# Patient Record
Sex: FEMALE | Race: WHITE | NOT HISPANIC OR LATINO | Employment: FULL TIME | URBAN - NONMETROPOLITAN AREA
[De-identification: names, ages, dates, MRNs, and addresses within clinical notes are randomized per-mention and may not be internally consistent; named-entity substitution may affect disease eponyms.]

---

## 2021-09-16 ENCOUNTER — OFFICE VISIT (OUTPATIENT)
Dept: FAMILY MEDICINE | Facility: CLINIC | Age: 66
End: 2021-09-16
Payer: COMMERCIAL

## 2021-09-16 ENCOUNTER — INFUSION (OUTPATIENT)
Dept: INFECTIOUS DISEASES | Facility: HOSPITAL | Age: 66
End: 2021-09-16
Attending: FAMILY MEDICINE
Payer: COMMERCIAL

## 2021-09-16 VITALS
HEART RATE: 74 BPM | BODY MASS INDEX: 31.24 KG/M2 | OXYGEN SATURATION: 95 % | RESPIRATION RATE: 18 BRPM | TEMPERATURE: 97 F | WEIGHT: 183 LBS | HEIGHT: 64 IN | SYSTOLIC BLOOD PRESSURE: 150 MMHG | DIASTOLIC BLOOD PRESSURE: 90 MMHG

## 2021-09-16 VITALS
DIASTOLIC BLOOD PRESSURE: 89 MMHG | HEART RATE: 78 BPM | TEMPERATURE: 99 F | OXYGEN SATURATION: 96 % | RESPIRATION RATE: 18 BRPM | SYSTOLIC BLOOD PRESSURE: 168 MMHG

## 2021-09-16 DIAGNOSIS — U07.1 COVID-19: ICD-10-CM

## 2021-09-16 DIAGNOSIS — Z20.822 CONTACT WITH AND (SUSPECTED) EXPOSURE TO COVID-19: ICD-10-CM

## 2021-09-16 DIAGNOSIS — U07.1 COVID-19: Primary | ICD-10-CM

## 2021-09-16 DIAGNOSIS — R09.81 NASAL CONGESTION: Primary | ICD-10-CM

## 2021-09-16 DIAGNOSIS — I50.9 CONGESTIVE HEART FAILURE, UNSPECIFIED HF CHRONICITY, UNSPECIFIED HEART FAILURE TYPE: ICD-10-CM

## 2021-09-16 LAB
CTP QC/QA: YES
SARS-COV-2 AG RESP QL IA.RAPID: POSITIVE

## 2021-09-16 PROCEDURE — 3008F BODY MASS INDEX DOCD: CPT | Mod: ,,, | Performed by: FAMILY MEDICINE

## 2021-09-16 PROCEDURE — 1159F MED LIST DOCD IN RCRD: CPT | Mod: ,,, | Performed by: FAMILY MEDICINE

## 2021-09-16 PROCEDURE — 1101F PR PT FALLS ASSESS DOC 0-1 FALLS W/OUT INJ PAST YR: ICD-10-PCS | Mod: ,,, | Performed by: FAMILY MEDICINE

## 2021-09-16 PROCEDURE — 3080F PR MOST RECENT DIASTOLIC BLOOD PRESSURE >= 90 MM HG: ICD-10-PCS | Mod: ,,, | Performed by: FAMILY MEDICINE

## 2021-09-16 PROCEDURE — 63600175 PHARM REV CODE 636 W HCPCS: Performed by: FAMILY MEDICINE

## 2021-09-16 PROCEDURE — 99213 OFFICE O/P EST LOW 20 MIN: CPT | Mod: ,,, | Performed by: FAMILY MEDICINE

## 2021-09-16 PROCEDURE — M0243 CASIRIVI AND IMDEVI INFUSION: HCPCS | Performed by: FAMILY MEDICINE

## 2021-09-16 PROCEDURE — 87426 SARS CORONAVIRUS 2 ANTIGEN POCT: ICD-10-PCS | Mod: QW,,, | Performed by: FAMILY MEDICINE

## 2021-09-16 PROCEDURE — 3077F PR MOST RECENT SYSTOLIC BLOOD PRESSURE >= 140 MM HG: ICD-10-PCS | Mod: ,,, | Performed by: FAMILY MEDICINE

## 2021-09-16 PROCEDURE — 25000003 PHARM REV CODE 250: Performed by: FAMILY MEDICINE

## 2021-09-16 PROCEDURE — 3080F DIAST BP >= 90 MM HG: CPT | Mod: ,,, | Performed by: FAMILY MEDICINE

## 2021-09-16 PROCEDURE — 3288F FALL RISK ASSESSMENT DOCD: CPT | Mod: ,,, | Performed by: FAMILY MEDICINE

## 2021-09-16 PROCEDURE — 99213 PR OFFICE/OUTPT VISIT, EST, LEVL III, 20-29 MIN: ICD-10-PCS | Mod: ,,, | Performed by: FAMILY MEDICINE

## 2021-09-16 PROCEDURE — 3008F PR BODY MASS INDEX (BMI) DOCUMENTED: ICD-10-PCS | Mod: ,,, | Performed by: FAMILY MEDICINE

## 2021-09-16 PROCEDURE — 1126F AMNT PAIN NOTED NONE PRSNT: CPT | Mod: ,,, | Performed by: FAMILY MEDICINE

## 2021-09-16 PROCEDURE — 1101F PT FALLS ASSESS-DOCD LE1/YR: CPT | Mod: ,,, | Performed by: FAMILY MEDICINE

## 2021-09-16 PROCEDURE — 1159F PR MEDICATION LIST DOCUMENTED IN MEDICAL RECORD: ICD-10-PCS | Mod: ,,, | Performed by: FAMILY MEDICINE

## 2021-09-16 PROCEDURE — 3288F PR FALLS RISK ASSESSMENT DOCUMENTED: ICD-10-PCS | Mod: ,,, | Performed by: FAMILY MEDICINE

## 2021-09-16 PROCEDURE — 1126F PR PAIN SEVERITY QUANTIFIED, NO PAIN PRESENT: ICD-10-PCS | Mod: ,,, | Performed by: FAMILY MEDICINE

## 2021-09-16 PROCEDURE — 3077F SYST BP >= 140 MM HG: CPT | Mod: ,,, | Performed by: FAMILY MEDICINE

## 2021-09-16 PROCEDURE — 87426 SARSCOV CORONAVIRUS AG IA: CPT | Mod: QW,,, | Performed by: FAMILY MEDICINE

## 2021-09-16 RX ORDER — LEVOTHYROXINE SODIUM 125 UG/1
125 TABLET ORAL DAILY
COMMUNITY
Start: 2021-08-19 | End: 2023-02-21 | Stop reason: SDUPTHER

## 2021-09-16 RX ORDER — ONDANSETRON 4 MG/1
4 TABLET, ORALLY DISINTEGRATING ORAL ONCE AS NEEDED
Status: DISCONTINUED | OUTPATIENT
Start: 2021-09-16 | End: 2023-01-18

## 2021-09-16 RX ORDER — ACETAMINOPHEN 500 MG
5000 TABLET ORAL DAILY
Status: ON HOLD | COMMUNITY
End: 2023-06-23 | Stop reason: HOSPADM

## 2021-09-16 RX ORDER — SODIUM CHLORIDE 0.9 % (FLUSH) 0.9 %
10 SYRINGE (ML) INJECTION
Status: DISCONTINUED | OUTPATIENT
Start: 2021-09-16 | End: 2023-01-18

## 2021-09-16 RX ORDER — DUREZOL 0.5 MG/ML
1 EMULSION OPHTHALMIC 2 TIMES DAILY PRN
COMMUNITY
Start: 2021-08-19 | End: 2023-02-20

## 2021-09-16 RX ORDER — CETIRIZINE HYDROCHLORIDE, PSEUDOEPHEDRINE HYDROCHLORIDE 5; 120 MG/1; MG/1
1 TABLET, FILM COATED, EXTENDED RELEASE ORAL DAILY
COMMUNITY
Start: 2021-09-15 | End: 2023-02-20

## 2021-09-16 RX ORDER — FUROSEMIDE 20 MG/1
40 TABLET ORAL DAILY
COMMUNITY
Start: 2021-08-19 | End: 2023-04-06 | Stop reason: SDUPTHER

## 2021-09-16 RX ORDER — IBUPROFEN 200 MG
1 CAPSULE ORAL DAILY
COMMUNITY

## 2021-09-16 RX ORDER — DIPHENHYDRAMINE HYDROCHLORIDE 50 MG/ML
25 INJECTION INTRAMUSCULAR; INTRAVENOUS ONCE AS NEEDED
Status: DISCONTINUED | OUTPATIENT
Start: 2021-09-16 | End: 2023-01-18

## 2021-09-16 RX ORDER — ACETAMINOPHEN 325 MG/1
650 TABLET ORAL ONCE AS NEEDED
Status: DISCONTINUED | OUTPATIENT
Start: 2021-09-16 | End: 2023-01-18

## 2021-09-16 RX ORDER — EPINEPHRINE 0.3 MG/.3ML
0.3 INJECTION SUBCUTANEOUS
Status: DISCONTINUED | OUTPATIENT
Start: 2021-09-16 | End: 2023-01-18

## 2021-09-16 RX ORDER — PANTOPRAZOLE SODIUM 40 MG/1
40 TABLET, DELAYED RELEASE ORAL DAILY
Status: ON HOLD | COMMUNITY
Start: 2021-08-19 | End: 2023-02-14 | Stop reason: SDUPTHER

## 2021-09-16 RX ORDER — AZITHROMYCIN 250 MG/1
TABLET, FILM COATED ORAL
Qty: 6 TABLET | Refills: 0 | Status: SHIPPED | OUTPATIENT
Start: 2021-09-16 | End: 2021-09-21

## 2021-09-16 RX ORDER — NEBIVOLOL HYDROCHLORIDE 5 MG/1
5 TABLET ORAL DAILY
COMMUNITY
Start: 2021-08-19 | End: 2023-11-30 | Stop reason: SDUPTHER

## 2021-09-16 RX ORDER — ALBUTEROL SULFATE 90 UG/1
2 AEROSOL, METERED RESPIRATORY (INHALATION)
Status: DISCONTINUED | OUTPATIENT
Start: 2021-09-16 | End: 2023-01-18

## 2021-09-16 RX ORDER — POTASSIUM CHLORIDE 750 MG/1
10 TABLET, EXTENDED RELEASE ORAL DAILY
COMMUNITY
Start: 2021-08-19 | End: 2023-04-06 | Stop reason: SDUPTHER

## 2021-09-16 RX ADMIN — SODIUM CHLORIDE 600 MG: 9 INJECTION, SOLUTION INTRAVENOUS at 12:09

## 2022-07-18 ENCOUNTER — OFFICE VISIT (OUTPATIENT)
Dept: FAMILY MEDICINE | Facility: CLINIC | Age: 67
End: 2022-07-18
Payer: COMMERCIAL

## 2022-07-18 VITALS
TEMPERATURE: 98 F | BODY MASS INDEX: 32.2 KG/M2 | RESPIRATION RATE: 18 BRPM | WEIGHT: 188.63 LBS | DIASTOLIC BLOOD PRESSURE: 90 MMHG | HEART RATE: 81 BPM | HEIGHT: 64 IN | SYSTOLIC BLOOD PRESSURE: 150 MMHG | OXYGEN SATURATION: 96 %

## 2022-07-18 DIAGNOSIS — D69.6 SEVERE THROMBOCYTOPENIA: ICD-10-CM

## 2022-07-18 DIAGNOSIS — F32.A DEPRESSION, UNSPECIFIED DEPRESSION TYPE: ICD-10-CM

## 2022-07-18 DIAGNOSIS — F41.9 ANXIETY: ICD-10-CM

## 2022-07-18 DIAGNOSIS — I10 HYPERTENSION, UNSPECIFIED TYPE: ICD-10-CM

## 2022-07-18 DIAGNOSIS — Z78.0 POST-MENOPAUSAL: ICD-10-CM

## 2022-07-18 DIAGNOSIS — R53.83 FATIGUE, UNSPECIFIED TYPE: ICD-10-CM

## 2022-07-18 DIAGNOSIS — Z12.39 ENCOUNTER FOR SCREENING FOR MALIGNANT NEOPLASM OF BREAST, UNSPECIFIED SCREENING MODALITY: ICD-10-CM

## 2022-07-18 DIAGNOSIS — G47.00 INSOMNIA, UNSPECIFIED TYPE: ICD-10-CM

## 2022-07-18 DIAGNOSIS — E03.9 HYPOTHYROIDISM, UNSPECIFIED TYPE: Primary | ICD-10-CM

## 2022-07-18 LAB
BILIRUB SERPL-MCNC: NEGATIVE MG/DL
BLOOD URINE, POC: NORMAL
COLOR, POC UA: YELLOW
GLUCOSE UR QL STRIP: NEGATIVE
KETONES UR QL STRIP: NEGATIVE
LEUKOCYTE ESTERASE URINE, POC: NEGATIVE
NITRITE, POC UA: NEGATIVE
PH, POC UA: 6
PROTEIN, POC: NEGATIVE
SPECIFIC GRAVITY, POC UA: 1.02
UROBILINOGEN, POC UA: 0.2

## 2022-07-18 PROCEDURE — 80050 GENERAL HEALTH PANEL: CPT | Mod: ICN,,, | Performed by: CLINICAL MEDICAL LABORATORY

## 2022-07-18 PROCEDURE — 82306 VITAMIN D 25 HYDROXY: CPT | Mod: ICN,,, | Performed by: CLINICAL MEDICAL LABORATORY

## 2022-07-18 PROCEDURE — 1126F AMNT PAIN NOTED NONE PRSNT: CPT | Mod: ,,, | Performed by: NURSE PRACTITIONER

## 2022-07-18 PROCEDURE — 1160F PR REVIEW ALL MEDS BY PRESCRIBER/CLIN PHARMACIST DOCUMENTED: ICD-10-PCS | Mod: ,,, | Performed by: NURSE PRACTITIONER

## 2022-07-18 PROCEDURE — 1101F PT FALLS ASSESS-DOCD LE1/YR: CPT | Mod: ,,, | Performed by: NURSE PRACTITIONER

## 2022-07-18 PROCEDURE — 1101F PR PT FALLS ASSESS DOC 0-1 FALLS W/OUT INJ PAST YR: ICD-10-PCS | Mod: ,,, | Performed by: NURSE PRACTITIONER

## 2022-07-18 PROCEDURE — 1160F RVW MEDS BY RX/DR IN RCRD: CPT | Mod: ,,, | Performed by: NURSE PRACTITIONER

## 2022-07-18 PROCEDURE — 99214 PR OFFICE/OUTPT VISIT, EST, LEVL IV, 30-39 MIN: ICD-10-PCS | Mod: ,,, | Performed by: NURSE PRACTITIONER

## 2022-07-18 PROCEDURE — 80050 PR  GENERAL HEALTH PANEL: ICD-10-PCS | Mod: ICN,,, | Performed by: CLINICAL MEDICAL LABORATORY

## 2022-07-18 PROCEDURE — 3288F PR FALLS RISK ASSESSMENT DOCUMENTED: ICD-10-PCS | Mod: ,,, | Performed by: NURSE PRACTITIONER

## 2022-07-18 PROCEDURE — 99214 OFFICE O/P EST MOD 30 MIN: CPT | Mod: ,,, | Performed by: NURSE PRACTITIONER

## 2022-07-18 PROCEDURE — 3288F FALL RISK ASSESSMENT DOCD: CPT | Mod: ,,, | Performed by: NURSE PRACTITIONER

## 2022-07-18 PROCEDURE — 3008F PR BODY MASS INDEX (BMI) DOCUMENTED: ICD-10-PCS | Mod: ,,, | Performed by: NURSE PRACTITIONER

## 2022-07-18 PROCEDURE — 3008F BODY MASS INDEX DOCD: CPT | Mod: ,,, | Performed by: NURSE PRACTITIONER

## 2022-07-18 PROCEDURE — 3077F SYST BP >= 140 MM HG: CPT | Mod: ,,, | Performed by: NURSE PRACTITIONER

## 2022-07-18 PROCEDURE — 1126F PR PAIN SEVERITY QUANTIFIED, NO PAIN PRESENT: ICD-10-PCS | Mod: ,,, | Performed by: NURSE PRACTITIONER

## 2022-07-18 PROCEDURE — 1159F PR MEDICATION LIST DOCUMENTED IN MEDICAL RECORD: ICD-10-PCS | Mod: ,,, | Performed by: NURSE PRACTITIONER

## 2022-07-18 PROCEDURE — 3077F PR MOST RECENT SYSTOLIC BLOOD PRESSURE >= 140 MM HG: ICD-10-PCS | Mod: ,,, | Performed by: NURSE PRACTITIONER

## 2022-07-18 PROCEDURE — 3080F PR MOST RECENT DIASTOLIC BLOOD PRESSURE >= 90 MM HG: ICD-10-PCS | Mod: ,,, | Performed by: NURSE PRACTITIONER

## 2022-07-18 PROCEDURE — 81003 URINALYSIS AUTO W/O SCOPE: CPT | Mod: QW,,, | Performed by: NURSE PRACTITIONER

## 2022-07-18 PROCEDURE — 3080F DIAST BP >= 90 MM HG: CPT | Mod: ,,, | Performed by: NURSE PRACTITIONER

## 2022-07-18 PROCEDURE — 1159F MED LIST DOCD IN RCRD: CPT | Mod: ,,, | Performed by: NURSE PRACTITIONER

## 2022-07-18 PROCEDURE — 82306 VITAMIN D: ICD-10-PCS | Mod: ICN,,, | Performed by: CLINICAL MEDICAL LABORATORY

## 2022-07-18 PROCEDURE — 81003 POCT URINALYSIS W/O SCOPE: ICD-10-PCS | Mod: QW,,, | Performed by: NURSE PRACTITIONER

## 2022-07-18 RX ORDER — TRAZODONE HYDROCHLORIDE 100 MG/1
100 TABLET ORAL NIGHTLY
Qty: 30 TABLET | Refills: 11 | Status: SHIPPED | OUTPATIENT
Start: 2022-07-18 | End: 2023-04-06 | Stop reason: SDUPTHER

## 2022-07-18 RX ORDER — VORTIOXETINE 10 MG/1
1 TABLET, FILM COATED ORAL DAILY
Qty: 30 TABLET | Refills: 2 | Status: SHIPPED | OUTPATIENT
Start: 2022-07-18 | End: 2022-11-21 | Stop reason: SDUPTHER

## 2022-07-18 NOTE — PROGRESS NOTES
07/18/22 1454   Depression Patient Health Questionnaire (PHQ-2)   Over the last two weeks how often have you been bothered by little interest or pleasure in doing things 3   Over the last two weeks how often have you been bothered by feeling down, depressed or hopeless 0   PHQ-2 Total Score 3   Depression Patient Health Questionnaire (PHQ-9)   Over the last two weeks how often have you been bothered by trouble falling or staying asleep, or sleeping too much 2   Over the last two weeks how often have you been bothered by feeling tired or having little energy 3   Over the last two weeks how often have you been bothered by a poor appetite or overeating 0   Over the last two weeks how often have you been bothered by feeling bad about yourself - or that you are a failure or have let yourself or your family down 3   Over the last two weeks how often have you been bothered by trouble concentrating on things, such as reading the newspaper or watching television 3   Over the last two weeks how often have you been bothered by moving or speaking so slowly that other people could have noticed. Or the opposite - being so fidgety or restless that you have been moving around a lot more than usual. 3   Over the last two weeks how often have you been bothered by thoughts that you would be better off dead, or of hurting yourself 0   If you checked off any problems, how difficult have these problems made it for you to do your work, take care of things at home or get along with other people? Somewhat difficult   PHQ-9 Score 17   PHQ-9 Interpretation Moderately Severe

## 2022-07-18 NOTE — PROGRESS NOTES
Kristyn Brito NP   [facility]  [address]  [place]  [address]      PATIENT NAME: Ene Martin  : 1955  DATE: 22  MRN: [id number]      Billing Provider: Kristyn Brito NP  Level of Service:   Patient PCP Information     Provider PCP Type    Dagoberto Ravi DO General          Reason for Visit / Chief Complaint: Fatigue (Has been feeling weak and tired for a while. Worse in the past couple days. Has had some nervousness/shakiness since yesterday. Has had a lot of anxiety and depression lately. Not able to focus or complete tasks. )       Update PCP  Update Chief Complaint         History of Present Illness / Problem Focused Workflow     Ene Martin presents to the clinic with Fatigue (Has been feeling weak and tired for a while. Worse in the past couple days. Has had some nervousness/shakiness since yesterday. Has had a lot of anxiety and depression lately. Not able to focus or complete tasks. )     66 year old female presents to clinic with complaints of feeling very fatigued and tired all the time, has no energy. Patient reports she is worried that it is a UTI because she often feels as if her bladder is not completely emptying. She also has complaints of anxiety and depression that seems to have gotten worse lately. She states she feels she is unable to focus and complete tasks. She reports she has taken Prozac in the past years ago and it did not help much, has also tried Zoloft, Lexapro, and Wellbutrin in past with minimal improvement. She is also requesting something to help her rest at night, has taken Trazodone in past with good results and requests refill on it. Patient has PMH of Anemia, CHF, Depression, GERD, Hx of alcohol abuse, Hyperlipidemia, HTN, Hypothyroidism, Idiopathic thrombocytopenic purpura, Left bundle branch block, Obesity, Osteomalacia, Osteoporosis, and Pernicious Anemia.    Blood pressure elevated at today's visit. Patient states she is  feeling anxious today. States blood pressure has been well controlled at home.       Review of Systems     @Review of Systems   Constitutional: Positive for fatigue. Negative for activity change, appetite change and fever.   HENT: Negative for nasal congestion, ear pain, rhinorrhea, sinus pressure/congestion and sore throat.    Eyes: Negative for pain, redness, visual disturbance and eye dryness.   Respiratory: Negative for cough and shortness of breath.    Cardiovascular: Negative for chest pain and leg swelling.   Gastrointestinal: Negative for abdominal distention, abdominal pain, constipation and diarrhea.   Endocrine: Negative for cold intolerance, heat intolerance and polyuria.   Genitourinary: Negative for bladder incontinence, dysuria, frequency and urgency.   Musculoskeletal: Negative for arthralgias, gait problem and myalgias.   Integumentary:  Negative for color change, rash and wound.   Allergic/Immunologic: Negative for environmental allergies and food allergies.   Neurological: Negative for dizziness, weakness, light-headedness and headaches.   Psychiatric/Behavioral: Positive for agitation, decreased concentration, dysphoric mood and sleep disturbance. Negative for behavioral problems. The patient is nervous/anxious.        Medical / Social / Family History     Past Medical History:   Diagnosis Date    Anemia     Anxiety     CHF (congestive heart failure)     Depression     GERD (gastroesophageal reflux disease)     History of alcohol abuse     Hyperlipidemia     Hypertension     Hypothyroidism     Idiopathic thrombocytopenic purpura     Left bundle branch block     Obesity     Osteomalacia     Osteoporosis     Pernicious anemia        Past Surgical History:   Procedure Laterality Date     SECTION      HYSTERECTOMY      THYROIDECTOMY         Social History  Ms.  reports that she has been smoking vaping with nicotine. She has never used smokeless tobacco. She reports  current alcohol use. She reports that she does not use drugs.    Family History  Ms.'s family history includes Lung cancer in her father; Lupus in her daughter; No Known Problems in her brother, maternal grandfather, maternal grandmother, paternal grandfather, paternal grandmother, sister, and son; Osteoporosis in her mother.    Medications and Allergies     Medications  Outpatient Medications Marked as Taking for the 7/18/22 encounter (Office Visit) with Kristyn Brito NP   Medication Sig Dispense Refill    BYSTOLIC 5 mg Tab Take 5 mg by mouth once daily.      calcium carbonate-vitamin D3 500 mg-3.125 mcg (125 unit) per tablet Take 1 tablet by mouth once daily.      cholecalciferol, vitamin D3, 125 mcg (5,000 unit) Tab Take 5,000 Units by mouth once daily.      furosemide (LASIX) 20 MG tablet Take 20 mg by mouth once daily.      multivit-min/iron/folic/lutein (CENTRUM SILVER WOMEN ORAL) Take 1 tablet by mouth once daily.      pantoprazole (PROTONIX) 40 MG tablet Take 40 mg by mouth once daily.      potassium chloride (KLOR-CON) 10 MEQ TbSR Take 10 mEq by mouth once daily.      SYNTHROID 125 mcg tablet Take 125 mcg by mouth once daily.       Current Facility-Administered Medications for the 7/18/22 encounter (Office Visit) with Kristyn Brito NP   Medication Dose Route Frequency Provider Last Rate Last Admin    acetaminophen tablet 650 mg  650 mg Oral Once PRN Dagoberto Ravi, DO        albuterol inhaler 2 puff  2 puff Inhalation Q20 Min PRN Dagoberto Ravi, DO        diphenhydrAMINE injection 25 mg  25 mg Intravenous Once PRN Dagoberto Ravi, DO        EPINEPHrine (EPIPEN) 0.3 mg/0.3 mL pen injection 0.3 mg  0.3 mg Intramuscular PRN Dagoberto Ravi, DO        methylPREDNISolone sodium succinate injection 40 mg  40 mg Intravenous Once PRN Dagoberto Ravi, DO        ondansetron disintegrating tablet 4 mg  4 mg Oral Once PRN Dagoberto Ravi, DO        sodium chloride 0.9% 500 mL flush bag    Intravenous PRN Dagoberto Ravi DO        sodium chloride 0.9% flush 10 mL  10 mL Intravenous PRN Dagoberto Ravi DO           Allergies  Review of patient's allergies indicates:   Allergen Reactions    Lisinopril        Physical Examination     Vitals:    07/18/22 1446   BP: (!) 150/90   Pulse: 81   Resp: 18   Temp: 98.1 °F (36.7 °C)     Physical Exam  Vitals and nursing note reviewed.   HENT:      Head: Normocephalic.      Right Ear: Tympanic membrane normal.      Left Ear: Tympanic membrane normal.      Nose: Nose normal.      Mouth/Throat:      Mouth: Mucous membranes are moist.      Pharynx: Oropharynx is clear. No posterior oropharyngeal erythema.   Eyes:      Pupils: Pupils are equal, round, and reactive to light.   Cardiovascular:      Rate and Rhythm: Normal rate and regular rhythm.      Heart sounds: Normal heart sounds.   Pulmonary:      Effort: Pulmonary effort is normal.      Breath sounds: Normal breath sounds.   Abdominal:      General: Abdomen is flat. Bowel sounds are normal. There is no distension.      Palpations: Abdomen is soft.      Tenderness: There is no abdominal tenderness. There is no right CVA tenderness or left CVA tenderness.   Musculoskeletal:         General: No swelling or tenderness.      Cervical back: Normal range of motion.      Right lower leg: No edema.      Left lower leg: No edema.   Skin:     General: Skin is warm and dry.      Capillary Refill: Capillary refill takes less than 2 seconds.   Neurological:      Mental Status: She is alert and oriented to person, place, and time.   Psychiatric:         Mood and Affect: Mood is anxious and depressed.         Behavior: Behavior normal.         Thought Content: Thought content normal.         Cognition and Memory: Cognition normal.         Judgment: Judgment normal.               No results found for: WBC, HGB, HCT, MCV, PLT       No results found for: NA, K, CL, CO2, GLU, BUN, CREATININE, CALCIUM, PROT, ALBUMIN, BILITOT,  ALKPHOS, AST, ALT, ANIONGAP, ESTGFRAFRICA, EGFRNONAA   No image results found.     Procedures   Assessment and Plan (including Health Maintenance)      Problem List  Smart Sets  Document Outside HM   :    Plan:           Problem List Items Addressed This Visit    None     Visit Diagnoses     Hypothyroidism, unspecified type    -  Primary    Encounter for screening for malignant neoplasm of breast, unspecified screening modality        Post-menopausal        Fatigue, unspecified type              Health Maintenance Topics with due status: Not Due       Topic Last Completion Date    Influenza Vaccine Not Due       No future appointments.     Health Maintenance Due   Topic Date Due    Hepatitis C Screening  Never done    TETANUS VACCINE  Never done    Mammogram  Never done    DEXA Scan  Never done    Pneumococcal Vaccines (Age 65+) (2 - PPSV23 or PCV20) 03/30/2016        No follow-ups on file.     Signature:  Kristyn Brito NP  [facility]  [address] [place] [address], [place]  [address]    Date of encounter: 7/18/22

## 2022-07-19 DIAGNOSIS — D69.6 SEVERE THROMBOCYTOPENIA: Primary | ICD-10-CM

## 2022-07-19 PROBLEM — Z78.0 POST-MENOPAUSAL: Status: ACTIVE | Noted: 2022-07-19

## 2022-07-19 PROBLEM — R53.83 FATIGUE: Status: ACTIVE | Noted: 2022-07-19

## 2022-07-19 PROBLEM — E03.9 HYPOTHYROIDISM: Status: ACTIVE | Noted: 2022-07-19

## 2022-07-19 PROBLEM — U07.1 COVID-19: Status: RESOLVED | Noted: 2021-09-16 | Resolved: 2022-07-19

## 2022-07-19 PROBLEM — Z12.39 ENCOUNTER FOR SCREENING FOR MALIGNANT NEOPLASM OF BREAST: Status: ACTIVE | Noted: 2022-07-19

## 2022-07-19 PROBLEM — G47.00 INSOMNIA: Status: ACTIVE | Noted: 2022-07-19

## 2022-07-19 LAB
25(OH)D3 SERPL-MCNC: 58.2 NG/ML
ALBUMIN SERPL BCP-MCNC: 3.9 G/DL (ref 3.5–5)
ALBUMIN/GLOB SERPL: 1 {RATIO}
ALP SERPL-CCNC: 129 U/L (ref 55–142)
ALT SERPL W P-5'-P-CCNC: 170 U/L (ref 13–56)
ANION GAP SERPL CALCULATED.3IONS-SCNC: 8 MMOL/L (ref 7–16)
AST SERPL W P-5'-P-CCNC: 132 U/L (ref 15–37)
BASOPHILS # BLD AUTO: 0.07 K/UL (ref 0–0.2)
BASOPHILS NFR BLD AUTO: 1 % (ref 0–1)
BILIRUB SERPL-MCNC: 0.5 MG/DL (ref 0–1.2)
BUN SERPL-MCNC: 15 MG/DL (ref 7–18)
BUN/CREAT SERPL: 16 (ref 6–20)
CALCIUM SERPL-MCNC: 9.2 MG/DL (ref 8.5–10.1)
CHLORIDE SERPL-SCNC: 104 MMOL/L (ref 98–107)
CO2 SERPL-SCNC: 30 MMOL/L (ref 21–32)
CREAT SERPL-MCNC: 0.96 MG/DL (ref 0.55–1.02)
DIFFERENTIAL METHOD BLD: ABNORMAL
EOSINOPHIL # BLD AUTO: 0.16 K/UL (ref 0–0.5)
EOSINOPHIL NFR BLD AUTO: 2.3 % (ref 1–4)
ERYTHROCYTE [DISTWIDTH] IN BLOOD BY AUTOMATED COUNT: 13.1 % (ref 11.5–14.5)
GLOBULIN SER-MCNC: 3.9 G/DL (ref 2–4)
GLUCOSE SERPL-MCNC: 125 MG/DL (ref 74–106)
HCT VFR BLD AUTO: 44.7 % (ref 38–47)
HGB BLD-MCNC: 15.4 G/DL (ref 12–16)
IMM GRANULOCYTES # BLD AUTO: 0.03 K/UL (ref 0–0.04)
IMM GRANULOCYTES NFR BLD: 0.4 % (ref 0–0.4)
LYMPHOCYTES # BLD AUTO: 1.99 K/UL (ref 1–4.8)
LYMPHOCYTES NFR BLD AUTO: 28.3 % (ref 27–41)
MCH RBC QN AUTO: 33.8 PG (ref 27–31)
MCHC RBC AUTO-ENTMCNC: 34.5 G/DL (ref 32–36)
MCV RBC AUTO: 98 FL (ref 80–96)
MONOCYTES # BLD AUTO: 0.58 K/UL (ref 0–0.8)
MONOCYTES NFR BLD AUTO: 8.3 % (ref 2–6)
MPC BLD CALC-MCNC: ABNORMAL G/DL
NEUTROPHILS # BLD AUTO: 4.2 K/UL (ref 1.8–7.7)
NEUTROPHILS NFR BLD AUTO: 59.7 % (ref 53–65)
NRBC # BLD AUTO: 0 X10E3/UL
NRBC, AUTO (.00): 0 %
PLATELET # BLD AUTO: 18 K/UL (ref 150–400)
PLATELET MORPHOLOGY: ABNORMAL
POTASSIUM SERPL-SCNC: 3.9 MMOL/L (ref 3.5–5.1)
PROT SERPL-MCNC: 7.8 G/DL (ref 6.4–8.2)
RBC # BLD AUTO: 4.56 M/UL (ref 4.2–5.4)
RBC MORPH BLD: NORMAL
SODIUM SERPL-SCNC: 138 MMOL/L (ref 136–145)
TSH SERPL DL<=0.005 MIU/L-ACNC: 3.69 UIU/ML (ref 0.36–3.74)
WBC # BLD AUTO: 7.03 K/UL (ref 4.5–11)

## 2022-07-19 NOTE — PROGRESS NOTES
Labs reviewed: Patient with severe thrombocytopenia with platelet count of 18. ALT and AST also elevated which could indicate some liver damage. No CBC on file to compare to. Contacted patient and notified of results. She states that she has Idiopathic Thrombocytopenia that she sees Dr Alvarez for. However, she reports her platelets usually run around 40 so this is very low for her. Patient denies any visible blood in urine or stool (she did have trace blood in the UA we performed yesterday), and states she has not had any problems stopping bleeding if she cuts herself or has an accident. Will refer back to Dr Alvarez for eval. Instructed patient to be very careful not to fall or cut herself. She verbalized understanding.

## 2022-07-19 NOTE — ASSESSMENT & PLAN NOTE
CBC, CMP, and Vit D obtained.   Fatigue may be related to depression and insomnia.   Start Trintellix and Trazodone as ordered.

## 2022-07-19 NOTE — ASSESSMENT & PLAN NOTE
Trintellix 10mg daily.  Trazodone 100mg half tab q HS.  Reviewed treatment plan and medication side effects/risk/benefits/directions on taking medications. Denies SI/HI. Should these symptoms develop, encouraged to proceed to the closest ER for additional evaluation and treatment. Instructed patient to notify family member of recent medication initiation and to watch for worsening symptoms, instructed to go to ER if discussed worsening symptoms occur and clinic is closed. Verbal contract for safety initiated with patient for prescriptions provided today. Instructed patient to return to clinic in 3-4 weeks to discuss effectiveness of drug and any side effects. Patient verbalized understanding of treatment plan and denies any questions.

## 2022-07-19 NOTE — ASSESSMENT & PLAN NOTE
Continue Bystolic as ordered.  Instructed on lifestyle changes to help lower blood pressure: DASH eating plan encouraged, Weight loss of 0.5-1 lb/week suggested, stressed the importance of routine exercise 30 minutes per day for 3-5 days/week, sodium restriction, and alcohol in moderation less than 2 drinks a day. Patient was instructed take medication as directed,keep BP log and bring to next appointment, monitor for discussed worsening symptoms that require emergent medical attention.Patient verbalized understanding of treatment plan and denies any questions.

## 2022-10-24 ENCOUNTER — OFFICE VISIT (OUTPATIENT)
Dept: FAMILY MEDICINE | Facility: CLINIC | Age: 67
End: 2022-10-24
Payer: COMMERCIAL

## 2022-10-24 ENCOUNTER — HOSPITAL ENCOUNTER (OUTPATIENT)
Dept: RADIOLOGY | Facility: HOSPITAL | Age: 67
Discharge: HOME | End: 2022-10-24
Attending: FAMILY MEDICINE
Payer: COMMERCIAL

## 2022-10-24 VITALS
WEIGHT: 190 LBS | BODY MASS INDEX: 32.44 KG/M2 | OXYGEN SATURATION: 97 % | SYSTOLIC BLOOD PRESSURE: 154 MMHG | HEIGHT: 64 IN | DIASTOLIC BLOOD PRESSURE: 88 MMHG | TEMPERATURE: 99 F | RESPIRATION RATE: 20 BRPM | HEART RATE: 88 BPM

## 2022-10-24 DIAGNOSIS — S09.90XA TRAUMATIC INJURY OF HEAD, INITIAL ENCOUNTER: ICD-10-CM

## 2022-10-24 DIAGNOSIS — D69.6 SEVERE THROMBOCYTOPENIA: Primary | ICD-10-CM

## 2022-10-24 DIAGNOSIS — I10 HYPERTENSION, UNSPECIFIED TYPE: ICD-10-CM

## 2022-10-24 DIAGNOSIS — R42 VERTIGO: ICD-10-CM

## 2022-10-24 DIAGNOSIS — F10.10 ETOH ABUSE: ICD-10-CM

## 2022-10-24 DIAGNOSIS — D69.6 SEVERE THROMBOCYTOPENIA: ICD-10-CM

## 2022-10-24 LAB
BASOPHILS # BLD AUTO: 0.05 K/UL (ref 0–0.2)
BASOPHILS NFR BLD AUTO: 0.6 % (ref 0–1)
DIFFERENTIAL METHOD BLD: ABNORMAL
EOSINOPHIL # BLD AUTO: 0.12 K/UL (ref 0–0.5)
EOSINOPHIL NFR BLD AUTO: 1.4 % (ref 1–4)
ERYTHROCYTE [DISTWIDTH] IN BLOOD BY AUTOMATED COUNT: 12.6 % (ref 11.5–14.5)
HCT VFR BLD AUTO: 34.2 % (ref 38–47)
HGB BLD-MCNC: 11.3 G/DL (ref 12–16)
LYMPHOCYTES # BLD AUTO: 2.16 K/UL (ref 1–4.8)
LYMPHOCYTES NFR BLD AUTO: 24.5 % (ref 27–41)
MCH RBC QN AUTO: 30.2 PG (ref 27–31)
MCHC RBC AUTO-ENTMCNC: 33 G/DL (ref 32–36)
MCV RBC AUTO: 91.4 FL (ref 80–96)
MONOCYTES # BLD AUTO: 0.74 K/UL (ref 0–0.8)
MONOCYTES NFR BLD AUTO: 8.4 % (ref 2–6)
MPC BLD CALC-MCNC: 13.8 FL (ref 9.4–12.4)
NEUTROPHILS # BLD AUTO: 5.76 K/UL (ref 1.8–7.7)
NEUTROPHILS NFR BLD AUTO: 65.1 % (ref 53–65)
PLATELET # BLD AUTO: 82 K/UL (ref 150–400)
RBC # BLD AUTO: 3.74 M/UL (ref 4.2–5.4)
WBC # BLD AUTO: 8.83 K/UL (ref 4.5–11)

## 2022-10-24 PROCEDURE — 3077F SYST BP >= 140 MM HG: CPT | Mod: ,,, | Performed by: FAMILY MEDICINE

## 2022-10-24 PROCEDURE — 99215 PR OFFICE/OUTPT VISIT, EST, LEVL V, 40-54 MIN: ICD-10-PCS | Mod: ,,, | Performed by: FAMILY MEDICINE

## 2022-10-24 PROCEDURE — 1101F PT FALLS ASSESS-DOCD LE1/YR: CPT | Mod: ,,, | Performed by: FAMILY MEDICINE

## 2022-10-24 PROCEDURE — 70450 CT HEAD/BRAIN W/O DYE: CPT | Mod: TC

## 2022-10-24 PROCEDURE — 3288F PR FALLS RISK ASSESSMENT DOCUMENTED: ICD-10-PCS | Mod: ,,, | Performed by: FAMILY MEDICINE

## 2022-10-24 PROCEDURE — 1101F PR PT FALLS ASSESS DOC 0-1 FALLS W/OUT INJ PAST YR: ICD-10-PCS | Mod: ,,, | Performed by: FAMILY MEDICINE

## 2022-10-24 PROCEDURE — 99215 OFFICE O/P EST HI 40 MIN: CPT | Mod: ,,, | Performed by: FAMILY MEDICINE

## 2022-10-24 PROCEDURE — 3079F DIAST BP 80-89 MM HG: CPT | Mod: ,,, | Performed by: FAMILY MEDICINE

## 2022-10-24 PROCEDURE — 3288F FALL RISK ASSESSMENT DOCD: CPT | Mod: ,,, | Performed by: FAMILY MEDICINE

## 2022-10-24 PROCEDURE — 3077F PR MOST RECENT SYSTOLIC BLOOD PRESSURE >= 140 MM HG: ICD-10-PCS | Mod: ,,, | Performed by: FAMILY MEDICINE

## 2022-10-24 PROCEDURE — 3079F PR MOST RECENT DIASTOLIC BLOOD PRESSURE 80-89 MM HG: ICD-10-PCS | Mod: ,,, | Performed by: FAMILY MEDICINE

## 2022-10-24 PROCEDURE — 1159F MED LIST DOCD IN RCRD: CPT | Mod: ,,, | Performed by: FAMILY MEDICINE

## 2022-10-24 PROCEDURE — 1159F PR MEDICATION LIST DOCUMENTED IN MEDICAL RECORD: ICD-10-PCS | Mod: ,,, | Performed by: FAMILY MEDICINE

## 2022-10-24 RX ORDER — MECLIZINE HYDROCHLORIDE 25 MG/1
25 TABLET ORAL 3 TIMES DAILY PRN
Qty: 30 TABLET | Refills: 1 | Status: ON HOLD | OUTPATIENT
Start: 2022-10-24 | End: 2023-02-14 | Stop reason: HOSPADM

## 2022-10-24 RX ORDER — ONDANSETRON HYDROCHLORIDE 8 MG/1
8 TABLET, FILM COATED ORAL EVERY 8 HOURS PRN
Qty: 20 TABLET | Refills: 1 | Status: ON HOLD | OUTPATIENT
Start: 2022-10-24 | End: 2023-02-14 | Stop reason: HOSPADM

## 2022-10-24 NOTE — ASSESSMENT & PLAN NOTE
Patient with EtOH abuse.  Liver function tests are slightly elevated in GGT is pending.  Recommended alcohol detox and patient stated she would get back with us within the next week.

## 2022-10-24 NOTE — ASSESSMENT & PLAN NOTE
Vertigo secondary to head trauma.  Will treat with Antivert 25 t.i.d. and Zofran p.r.n. for nausea vomiting.  Alcohol abuse is likely contributing to the vertigo recommend she go to alcohol detox.

## 2022-10-24 NOTE — ASSESSMENT & PLAN NOTE
Blood pressure is controlled but not at target.  Is currently in 154 radiate range but would like for her to be in the 130/70 range.  No change in medicines.  But follow-up here in 1 month.
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
## 2022-10-24 NOTE — ASSESSMENT & PLAN NOTE
History of of thrombocytopenia with platelet count today being 82,000. Hemoglobin was 11.3.  White count was normal.  Stable platelet counts but will need to be referred to hematology for possible bone marrow biopsy.

## 2022-11-21 DIAGNOSIS — F32.A DEPRESSION, UNSPECIFIED DEPRESSION TYPE: Primary | ICD-10-CM

## 2022-11-21 RX ORDER — VORTIOXETINE 10 MG/1
1 TABLET, FILM COATED ORAL DAILY
Qty: 30 TABLET | Refills: 2 | Status: SHIPPED | OUTPATIENT
Start: 2022-11-21 | End: 2023-02-08

## 2023-01-18 ENCOUNTER — OFFICE VISIT (OUTPATIENT)
Dept: FAMILY MEDICINE | Facility: CLINIC | Age: 68
End: 2023-01-18
Payer: COMMERCIAL

## 2023-01-18 VITALS
HEART RATE: 74 BPM | BODY MASS INDEX: 32.44 KG/M2 | HEIGHT: 64 IN | SYSTOLIC BLOOD PRESSURE: 126 MMHG | RESPIRATION RATE: 20 BRPM | TEMPERATURE: 99 F | DIASTOLIC BLOOD PRESSURE: 64 MMHG | OXYGEN SATURATION: 96 % | WEIGHT: 190 LBS

## 2023-01-18 DIAGNOSIS — R42 VERTIGO: Primary | ICD-10-CM

## 2023-01-18 DIAGNOSIS — F10.10 ETOH ABUSE: ICD-10-CM

## 2023-01-18 DIAGNOSIS — R07.9 CHEST PAIN, UNSPECIFIED TYPE: ICD-10-CM

## 2023-01-18 DIAGNOSIS — D69.6 SEVERE THROMBOCYTOPENIA: ICD-10-CM

## 2023-01-18 DIAGNOSIS — I10 HYPERTENSION, UNSPECIFIED TYPE: ICD-10-CM

## 2023-01-18 DIAGNOSIS — D50.9 IRON DEFICIENCY ANEMIA, UNSPECIFIED IRON DEFICIENCY ANEMIA TYPE: ICD-10-CM

## 2023-01-18 DIAGNOSIS — R53.1 WEAKNESS: ICD-10-CM

## 2023-01-18 LAB
ALBUMIN SERPL BCP-MCNC: 3.8 G/DL (ref 3.5–5)
ALBUMIN/GLOB SERPL: 1 {RATIO}
ALP SERPL-CCNC: 98 U/L (ref 55–142)
ALT SERPL W P-5'-P-CCNC: 69 U/L (ref 13–56)
ANION GAP SERPL CALCULATED.3IONS-SCNC: 13 MMOL/L (ref 7–16)
ANISOCYTOSIS BLD QL SMEAR: ABNORMAL
AST SERPL W P-5'-P-CCNC: 46 U/L (ref 15–37)
BASOPHILS # BLD AUTO: 0.07 K/UL (ref 0–0.2)
BASOPHILS NFR BLD AUTO: 0.9 % (ref 0–1)
BILIRUB SERPL-MCNC: 0.3 MG/DL (ref ?–1.2)
BUN SERPL-MCNC: 10 MG/DL (ref 7–18)
BUN/CREAT SERPL: 10 (ref 6–20)
CALCIUM SERPL-MCNC: 8.9 MG/DL (ref 8.5–10.1)
CHLORIDE SERPL-SCNC: 101 MMOL/L (ref 98–107)
CO2 SERPL-SCNC: 33 MMOL/L (ref 21–32)
CREAT SERPL-MCNC: 1.02 MG/DL (ref 0.55–1.02)
CRP SERPL-MCNC: <0.29 MG/DL (ref 0–0.8)
DIFFERENTIAL METHOD BLD: ABNORMAL
EGFR (NO RACE VARIABLE) (RUSH/TITUS): 60 ML/MIN/1.73M²
EOSINOPHIL # BLD AUTO: 0.14 K/UL (ref 0–0.5)
EOSINOPHIL NFR BLD AUTO: 1.8 % (ref 1–4)
EOSINOPHIL NFR BLD MANUAL: 3 % (ref 1–4)
ERYTHROCYTE [DISTWIDTH] IN BLOOD BY AUTOMATED COUNT: 19.9 % (ref 11.5–14.5)
ERYTHROCYTE [SEDIMENTATION RATE] IN BLOOD BY WESTERGREN METHOD: 12 MM/HR (ref 0–30)
FERRITIN SERPL-MCNC: 21 NG/ML (ref 8–252)
GGT SERPL-CCNC: 80 U/L (ref 5–55)
GLOBULIN SER-MCNC: 3.8 G/DL (ref 2–4)
GLUCOSE SERPL-MCNC: 116 MG/DL (ref 74–106)
HCT VFR BLD AUTO: 25.5 % (ref 38–47)
HGB BLD-MCNC: 7.3 G/DL (ref 12–16)
HYPOCHROMIA BLD QL SMEAR: ABNORMAL
IRON SATN MFR SERPL: 73 % (ref 14–50)
IRON SERPL-MCNC: 396 ΜG/DL (ref 50–170)
LYMPHOCYTES # BLD AUTO: 1.94 K/UL (ref 1–4.8)
LYMPHOCYTES NFR BLD AUTO: 24.4 % (ref 27–41)
LYMPHOCYTES NFR BLD MANUAL: 24 % (ref 27–41)
MCH RBC QN AUTO: 22.3 PG (ref 27–31)
MCHC RBC AUTO-ENTMCNC: 28.6 G/DL (ref 32–36)
MCV RBC AUTO: 78 FL (ref 80–96)
MONOCYTES # BLD AUTO: 0.86 K/UL (ref 0–0.8)
MONOCYTES NFR BLD AUTO: 10.8 % (ref 2–6)
MONOCYTES NFR BLD MANUAL: 6 % (ref 2–6)
MPC BLD CALC-MCNC: ABNORMAL G/DL
NEUTROPHILS # BLD AUTO: 4.95 K/UL (ref 1.8–7.7)
NEUTROPHILS NFR BLD AUTO: 62.1 % (ref 53–65)
NEUTS BAND NFR BLD MANUAL: 1 % (ref 1–5)
NEUTS SEG NFR BLD MANUAL: 66 % (ref 50–62)
NRBC BLD MANUAL-RTO: ABNORMAL %
PLATELET # BLD AUTO: 85 K/UL (ref 150–400)
POIKILOCYTOSIS BLD QL SMEAR: ABNORMAL
POTASSIUM SERPL-SCNC: 3.9 MMOL/L (ref 3.5–5.1)
PROT SERPL-MCNC: 7.6 G/DL (ref 6.4–8.2)
RBC # BLD AUTO: 3.27 M/UL (ref 4.2–5.4)
SODIUM SERPL-SCNC: 143 MMOL/L (ref 136–145)
T4 SERPL-MCNC: 12.8 ΜG/DL (ref 4.8–13.9)
TIBC SERPL-MCNC: 544 ΜG/DL (ref 250–450)
TSH SERPL DL<=0.005 MIU/L-ACNC: 2.39 UIU/ML (ref 0.36–3.74)
URATE SERPL-MCNC: 8.7 MG/DL (ref 2.6–6)
WBC # BLD AUTO: 7.96 K/UL (ref 4.5–11)

## 2023-01-18 PROCEDURE — 1159F PR MEDICATION LIST DOCUMENTED IN MEDICAL RECORD: ICD-10-PCS | Mod: ,,, | Performed by: FAMILY MEDICINE

## 2023-01-18 PROCEDURE — 83540 IRON AND TIBC: ICD-10-PCS | Mod: ,,, | Performed by: CLINICAL MEDICAL LABORATORY

## 2023-01-18 PROCEDURE — 84436 ASSAY OF TOTAL THYROXINE: CPT | Mod: ,,, | Performed by: CLINICAL MEDICAL LABORATORY

## 2023-01-18 PROCEDURE — 93005 PR ELECTROCARDIOGRAM, TRACING: ICD-10-PCS | Mod: ,,, | Performed by: FAMILY MEDICINE

## 2023-01-18 PROCEDURE — 3008F BODY MASS INDEX DOCD: CPT | Mod: ,,, | Performed by: FAMILY MEDICINE

## 2023-01-18 PROCEDURE — 99215 OFFICE O/P EST HI 40 MIN: CPT | Mod: ,,, | Performed by: FAMILY MEDICINE

## 2023-01-18 PROCEDURE — 86140 C-REACTIVE PROTEIN: CPT | Mod: ,,, | Performed by: CLINICAL MEDICAL LABORATORY

## 2023-01-18 PROCEDURE — 3078F DIAST BP <80 MM HG: CPT | Mod: ,,, | Performed by: FAMILY MEDICINE

## 2023-01-18 PROCEDURE — 84550 ASSAY OF BLOOD/URIC ACID: CPT | Performed by: FAMILY MEDICINE

## 2023-01-18 PROCEDURE — 83540 ASSAY OF IRON: CPT | Mod: ,,, | Performed by: CLINICAL MEDICAL LABORATORY

## 2023-01-18 PROCEDURE — 1159F MED LIST DOCD IN RCRD: CPT | Mod: ,,, | Performed by: FAMILY MEDICINE

## 2023-01-18 PROCEDURE — 83550 IRON AND TIBC: ICD-10-PCS | Mod: ,,, | Performed by: CLINICAL MEDICAL LABORATORY

## 2023-01-18 PROCEDURE — 3008F PR BODY MASS INDEX (BMI) DOCUMENTED: ICD-10-PCS | Mod: ,,, | Performed by: FAMILY MEDICINE

## 2023-01-18 PROCEDURE — 3074F PR MOST RECENT SYSTOLIC BLOOD PRESSURE < 130 MM HG: ICD-10-PCS | Mod: ,,, | Performed by: FAMILY MEDICINE

## 2023-01-18 PROCEDURE — 3074F SYST BP LT 130 MM HG: CPT | Mod: ,,, | Performed by: FAMILY MEDICINE

## 2023-01-18 PROCEDURE — 84443 ASSAY THYROID STIM HORMONE: CPT | Performed by: FAMILY MEDICINE

## 2023-01-18 PROCEDURE — 82728 FERRITIN: ICD-10-PCS | Mod: ,,, | Performed by: CLINICAL MEDICAL LABORATORY

## 2023-01-18 PROCEDURE — 84436 T4: ICD-10-PCS | Mod: ,,, | Performed by: CLINICAL MEDICAL LABORATORY

## 2023-01-18 PROCEDURE — 99215 PR OFFICE/OUTPT VISIT, EST, LEVL V, 40-54 MIN: ICD-10-PCS | Mod: ,,, | Performed by: FAMILY MEDICINE

## 2023-01-18 PROCEDURE — 80053 COMPREHEN METABOLIC PANEL: CPT | Performed by: FAMILY MEDICINE

## 2023-01-18 PROCEDURE — 85025 COMPLETE CBC W/AUTO DIFF WBC: CPT | Performed by: FAMILY MEDICINE

## 2023-01-18 PROCEDURE — 82977 ASSAY OF GGT: CPT | Mod: ,,, | Performed by: CLINICAL MEDICAL LABORATORY

## 2023-01-18 PROCEDURE — 82977 GAMMA GT: ICD-10-PCS | Mod: ,,, | Performed by: CLINICAL MEDICAL LABORATORY

## 2023-01-18 PROCEDURE — 1125F AMNT PAIN NOTED PAIN PRSNT: CPT | Mod: ,,, | Performed by: FAMILY MEDICINE

## 2023-01-18 PROCEDURE — 82728 ASSAY OF FERRITIN: CPT | Mod: ,,, | Performed by: CLINICAL MEDICAL LABORATORY

## 2023-01-18 PROCEDURE — 83550 IRON BINDING TEST: CPT | Mod: ,,, | Performed by: CLINICAL MEDICAL LABORATORY

## 2023-01-18 PROCEDURE — 93005 ELECTROCARDIOGRAM TRACING: CPT | Mod: ,,, | Performed by: FAMILY MEDICINE

## 2023-01-18 PROCEDURE — 1125F PR PAIN SEVERITY QUANTIFIED, PAIN PRESENT: ICD-10-PCS | Mod: ,,, | Performed by: FAMILY MEDICINE

## 2023-01-18 PROCEDURE — 86140 C-REACTIVE PROTEIN: ICD-10-PCS | Mod: ,,, | Performed by: CLINICAL MEDICAL LABORATORY

## 2023-01-18 PROCEDURE — 85651 RBC SED RATE NONAUTOMATED: CPT | Performed by: FAMILY MEDICINE

## 2023-01-18 PROCEDURE — 3078F PR MOST RECENT DIASTOLIC BLOOD PRESSURE < 80 MM HG: ICD-10-PCS | Mod: ,,, | Performed by: FAMILY MEDICINE

## 2023-01-18 RX ORDER — FERROUS SULFATE 325(65) MG
325 TABLET ORAL 2 TIMES DAILY
Status: ON HOLD | COMMUNITY
End: 2023-06-23 | Stop reason: HOSPADM

## 2023-01-18 RX ORDER — MUPIROCIN 20 MG/G
OINTMENT TOPICAL
COMMUNITY
Start: 2023-01-11 | End: 2023-02-20

## 2023-01-18 NOTE — LETTER
January 24, 2023      [facility]  [address]  [place] [address]  Phone: [phone]  Fax: [phone]       Patient: Ene Martin   YOB: 1955  Date of Visit: 01/18/2023    To Whom It May Concern:    Cheyanne Martin  was at [facility] on 01/18/2023.  Please excuse this patient from work on 01/17/2023-01/20/2023. The patient may return to work/school on 01/21/2023 with no restrictions. If you have any questions or concerns, or if I can be of further assistance, please do not hesitate to contact me.    Sincerely,    Emilia Mora RN

## 2023-01-18 NOTE — PROGRESS NOTES
"   Dagoberto Ravi DO   [address]  [address]      PATIENT NAME: Ene Martin  : 1955  DATE: 23  MRN: [id number]      Billing Provider: Dagoberto Ravi DO  Level of Service:   Patient PCP Information       Provider PCP Type    Dagoberto Ravi DO General            Reason for Visit / Chief Complaint: Fatigue (Pt c/o increased fatigue every day that has been going on for several weeks and progressively getting worse. Pt was see at [facility] in [place] and had lab work done which showed "anemia". She was told to take ferrous sulfate 325mg BID and increase her Lasix to 40mg daily due to pedal edema. ), Generalized Body Aches (Pt c/o generalized muscle aches/ extremity weakness.), and Shortness of Breath (SOB with exertion, even short distances. )       Update PCP  Update Chief Complaint         History of Present Illness / Problem Focused Workflow     Ene Martin presents to the clinic with Fatigue (Pt c/o increased fatigue every day that has been going on for several weeks and progressively getting worse. Pt was see at [facility] in [place] and had lab work done which showed "anemia". She was told to take ferrous sulfate 325mg BID and increase her Lasix to 40mg daily due to pedal edema. ), Generalized Body Aches (Pt c/o generalized muscle aches/ extremity weakness.), and Shortness of Breath (SOB with exertion, even short distances. )     Patient is back in for follow-up on her anemia.  She had labs drawn that showed that she has adequate stores of iron.  Patient has a history of low platelet counts in the past.  She does admit to drinking alcohol daily.  She reports fatigue.      Review of Systems     Review of Systems   Constitutional:  Positive for activity change and fatigue. Negative for appetite change, chills and fever.   HENT:  Negative for nasal congestion, ear discharge, ear pain, mouth dryness, mouth " sores, postnasal drip, sinus pressure/congestion, sore throat and voice change.    Eyes:  Negative for pain, discharge, redness, itching and visual disturbance.   Respiratory:  Positive for shortness of breath. Negative for apnea, cough, chest tightness and wheezing.    Cardiovascular:  Negative for chest pain, palpitations and leg swelling.   Gastrointestinal:  Negative for abdominal distention, abdominal pain, anal bleeding, blood in stool, change in bowel habit, constipation, diarrhea, nausea, vomiting, reflux and change in bowel habit.   Endocrine: Negative for cold intolerance, heat intolerance, polydipsia, polyphagia and polyuria.   Genitourinary:  Negative for difficulty urinating, enuresis, frequency, genital sores, hematuria, hot flashes, menstrual irregularity, urgency and vaginal dryness.   Musculoskeletal:  Negative for arthralgias, back pain, gait problem, leg pain, myalgias and neck pain.   Integumentary:  Negative for rash, mole/lesion, breast mass, breast discharge and breast tenderness.   Allergic/Immunologic: Negative for environmental allergies and food allergies.   Neurological:  Negative for dizziness, vertigo, tremors, seizures, syncope, facial asymmetry, speech difficulty, weakness, light-headedness, numbness, headaches, coordination difficulties, memory loss and coordination difficulties.   Hematological:  Negative for adenopathy. Does not bruise/bleed easily.   Psychiatric/Behavioral:  Negative for agitation, behavioral problems, confusion, decreased concentration, dysphoric mood, hallucinations, self-injury, sleep disturbance and suicidal ideas. The patient is not nervous/anxious and is not hyperactive.    Breast: Negative for mass and tenderness    Medical / Social / Family History     Past Medical History:   Diagnosis Date    Anemia     Anxiety disorder, unspecified     CHF (congestive heart failure)     Depression     GERD (gastroesophageal reflux disease)     History of alcohol abuse      Hyperlipidemia     Hypertension     Hypothyroidism     Idiopathic thrombocytopenic purpura     Left bundle branch block     Obesity     Osteomalacia     Osteoporosis     Pernicious anemia        Past Surgical History:   Procedure Laterality Date    CATARACT EXTRACTION Bilateral      SECTION      HYSTERECTOMY      THYROIDECTOMY         Social History  Ms.  reports that she has been smoking vaping with nicotine and cigarettes. She started smoking about 51 years ago. She has been smoking an average of .5 packs per day. She has never used smokeless tobacco. She reports current alcohol use. She reports that she does not use drugs.    Family History  Ms.'s family history includes Lung cancer in her father; Lupus in her daughter; No Known Problems in her brother, maternal grandfather, maternal grandmother, paternal grandfather, paternal grandmother, sister, and son; Osteoporosis in her mother.    Medications and Allergies     Medications  Outpatient Medications Marked as Taking for the 23 encounter (Office Visit) with Dagoberto Ravi, DO   Medication Sig Dispense Refill    BYSTOLIC 5 mg Tab Take 5 mg by mouth once daily.      ferrous sulfate (FEOSOL) 325 mg (65 mg iron) Tab tablet Take 325 mg by mouth 2 (two) times daily.      furosemide (LASIX) 20 MG tablet Take 40 mg by mouth once daily.      multivit-min/iron/folic/lutein (CENTRUM SILVER WOMEN ORAL) Take 1 tablet by mouth once daily.      mupirocin (BACTROBAN) 2 % ointment SMARTSIG:Sparingly Both Nares Twice Daily      pantoprazole (PROTONIX) 40 MG tablet Take 40 mg by mouth once daily.      potassium chloride (KLOR-CON) 10 MEQ TbSR Take 10 mEq by mouth once daily.      SYNTHROID 125 mcg tablet Take 125 mcg by mouth once daily.      traZODone (DESYREL) 100 MG tablet Take 1 tablet (100 mg total) by mouth every evening. 30 tablet 11    vortioxetine (TRINTELLIX) 10 mg Tab Take 1 tablet (10 mg total) by mouth once daily. 30 tablet 2        Allergies  Review of patient's allergies indicates:   Allergen Reactions    Lisinopril Other (See Comments)     thrombocytopenia       Physical Examination     Vitals:    01/18/23 1332   BP: 126/64   Pulse: 74   Resp: 20   Temp: 98.6 °F (37 °C)     Physical Exam  Vitals reviewed.   Constitutional:       General: She is not in acute distress.     Appearance: Normal appearance. She is obese.   HENT:      Head: Normocephalic and atraumatic.      Nose: Nose normal.      Mouth/Throat:      Mouth: Mucous membranes are moist.      Pharynx: Oropharynx is clear. No oropharyngeal exudate or posterior oropharyngeal erythema.   Eyes:      General: No scleral icterus.     Extraocular Movements: Extraocular movements intact.      Conjunctiva/sclera: Conjunctivae normal.      Pupils: Pupils are equal, round, and reactive to light.   Neck:      Vascular: No carotid bruit.   Cardiovascular:      Rate and Rhythm: Normal rate and regular rhythm.      Pulses: Normal pulses.      Heart sounds: Normal heart sounds. No murmur heard.    No gallop.   Pulmonary:      Effort: Pulmonary effort is normal.      Breath sounds: Normal breath sounds. No wheezing.   Abdominal:      General: Abdomen is flat. Bowel sounds are normal. There is no distension.      Palpations: Abdomen is soft.      Tenderness: There is no abdominal tenderness.   Musculoskeletal:         General: Normal range of motion.      Cervical back: Normal range of motion and neck supple.      Right lower leg: No edema.      Left lower leg: No edema.   Lymphadenopathy:      Cervical: No cervical adenopathy.   Skin:     General: Skin is warm and dry.      Capillary Refill: Capillary refill takes less than 2 seconds.      Coloration: Skin is not jaundiced.      Findings: No lesion.   Neurological:      General: No focal deficit present.      Mental Status: She is alert and oriented to person, place, and time. Mental status is at baseline.      Cranial Nerves: No cranial nerve  deficit.      Sensory: No sensory deficit.      Motor: No weakness.      Coordination: Coordination normal.      Gait: Gait normal.      Deep Tendon Reflexes: Reflexes normal.   Psychiatric:         Mood and Affect: Mood normal.         Behavior: Behavior normal.         Thought Content: Thought content normal.         Judgment: Judgment normal.             Lab Results   Component Value Date    WBC 7.96 01/18/2023    HGB 7.3 (L) 01/18/2023    HCT 25.5 (L) 01/18/2023    MCV 78.0 (L) 01/18/2023    PLT 85 (L) 01/18/2023          Sodium   Date Value Ref Range Status   01/18/2023 143 136 - 145 mmol/L Final     Potassium   Date Value Ref Range Status   01/18/2023 3.9 3.5 - 5.1 mmol/L Final     Chloride   Date Value Ref Range Status   01/18/2023 101 98 - 107 mmol/L Final     CO2   Date Value Ref Range Status   01/18/2023 33 (H) 21 - 32 mmol/L Final     Glucose   Date Value Ref Range Status   01/18/2023 116 (H) 74 - 106 mg/dL Final     BUN   Date Value Ref Range Status   01/18/2023 10 7 - 18 mg/dL Final     Creatinine   Date Value Ref Range Status   01/18/2023 1.02 0.55 - 1.02 mg/dL Final     Calcium   Date Value Ref Range Status   01/18/2023 8.9 8.5 - 10.1 mg/dL Final     Total Protein   Date Value Ref Range Status   01/18/2023 7.6 6.4 - 8.2 g/dL Final     Albumin   Date Value Ref Range Status   01/18/2023 3.8 3.5 - 5.0 g/dL Final     Bilirubin, Total   Date Value Ref Range Status   01/18/2023 0.3 >0.0 - 1.2 mg/dL Final     Alk Phos   Date Value Ref Range Status   01/18/2023 98 55 - 142 U/L Final     AST   Date Value Ref Range Status   01/18/2023 46 (H) 15 - 37 U/L Final     ALT   Date Value Ref Range Status   01/18/2023 69 (H) 13 - 56 U/L Final     Anion Gap   Date Value Ref Range Status   01/18/2023 13 7 - 16 mmol/L Final     eGFR   Date Value Ref Range Status   07/18/2022 62 >=60 mL/min/1.73m² Final      CT Head Without Contrast  Narrative: EXAMINATION:  CT HEAD WITHOUT CONTRAST    CLINICAL HISTORY:  head trauma with  headache and vertigo with low platelets; Thrombocytopenia, unspecified    TECHNIQUE:  Low dose axial images were obtained through the head.  Coronal and sagittal reformations were also performed. Contrast was not administered.    COMPARISON:  None.    FINDINGS:  No acute intracranial hemorrhage.  No mass effect or midline shift.  No large vessel acute infarct.  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  Mild chronic microvascular ischemia.  Vascular calcifications.  Impression: No acute intracranial abnormality.    Electronically signed by: Enrico Yu  Date:    10/24/2022  Time:    16:03     Procedures   Assessment and Plan (including Health Maintenance)      Problem List  Smart Sets  Document Outside HM   :    Plan:         Health Maintenance Due   Topic Date Due    Hepatitis C Screening  Never done    Lipid Panel  Never done    Mammogram  Never done    Hemoglobin A1c (Diabetic Prevention Screening)  Never done    DEXA Scan  Never done    Influenza Vaccine (1) Never done       Problem List Items Addressed This Visit          Psychiatric    ETOH abuse    Current Assessment & Plan     Patient reports drinking 6-8 beers daily.  Patient's GGT was elevated at 80.  ALT was 69 and AST was 45 slightly elevated the normal and phos.  Recommended that she stopped drinking but patient is unwilling to stop at this time.         Relevant Orders    Gamma GT (Completed)    POCT Urine Drug Screen Presump       ENT    Vertigo - Primary    Current Assessment & Plan     Dizziness likely related to her anemia.  Antivert as needed her vertigo symptoms.  Follow-up with Hematology.         Relevant Orders    TSH (Completed)    T4 (Completed)       Cardiac/Vascular    Hypertension    Current Assessment & Plan     Hypertension well controlled no change in medicines.  Follow-up in 3 months.         Relevant Orders    Comprehensive Metabolic Panel (Completed)    CBC Auto Differential (Completed)    Sedimentation Rate (Completed)    C-Reactive Protein  (Completed)    Uric Acid (Completed)       Hematology    Severe thrombocytopenia    Current Assessment & Plan     Recurrent thrombocytopenia with most recent platelet count of 80,000. Will refer to Hematology for further workup.         Relevant Orders    CBC Auto Differential (Completed)    Sedimentation Rate (Completed)    C-Reactive Protein (Completed)    Ambulatory referral/consult to Hematology / Oncology       Oncology    Iron deficiency anemia    Current Assessment & Plan     Microcytic anemia that was felt to be secondary to iron deficiency however her iron levels are 396 with an iron saturation of 73% and a TIBC of 544 with a ferritin level of 21 hemoglobin was 7.3 and hematocrit 25.5 with MCV of 78.  Patient's platelet counts were down at 85,000. Will refer to hematology for further workup.  Not iron deficiency anemia.         Relevant Orders    CBC Auto Differential (Completed)    Iron and TIBC (Completed)    Ferritin (Completed)       Other    Weakness    Current Assessment & Plan     Weakness likely secondary to her anemia.  Will refer to Hematology.  Patient also has history of alcohol or substance abuse which may be contributing to her fatigue.  Will check labs on her to include a TSH and T4.         Relevant Orders    TSH (Completed)    T4 (Completed)    Comprehensive Metabolic Panel (Completed)    CBC Auto Differential (Completed)     Other Visit Diagnoses       Chest pain, unspecified type        Relevant Orders    Comprehensive Metabolic Panel (Completed)    CBC Auto Differential (Completed)    Sedimentation Rate (Completed)    C-Reactive Protein (Completed)    Uric Acid (Completed)    POCT EKG 12-LEAD (Manually Resulted by Ordering Provider)            The patient has no Health Maintenance topics of status Not Due    Future Appointments   Date Time Provider Department Center   2/22/2023  1:45 PM Dagoberto Ravi,  [facility]        Follow up in about 4 weeks (around 2/15/2023).      Signature:  Dagoberto Ravi [address] [place]  [address]    Date of encounter: 1/18/23

## 2023-01-19 ENCOUNTER — TELEPHONE (OUTPATIENT)
Dept: FAMILY MEDICINE | Facility: CLINIC | Age: 68
End: 2023-01-19
Payer: COMMERCIAL

## 2023-01-19 NOTE — TELEPHONE ENCOUNTER
Patient called asking about if she needs to continue her iron tablets. Spoke with  regarding this question. He stated patient does not need to continue ferrous sulfate. Spoke with patient and she was notified to stop this medication. She voiced understanding.

## 2023-01-27 PROBLEM — R53.1 WEAKNESS: Status: ACTIVE | Noted: 2022-07-19

## 2023-01-27 NOTE — ASSESSMENT & PLAN NOTE
Dizziness likely related to her anemia.  Antivert as needed her vertigo symptoms.  Follow-up with Hematology.

## 2023-01-27 NOTE — ASSESSMENT & PLAN NOTE
Microcytic anemia that was felt to be secondary to iron deficiency however her iron levels are 396 with an iron saturation of 73% and a TIBC of 544 with a ferritin level of 21 hemoglobin was 7.3 and hematocrit 25.5 with MCV of 78.  Patient's platelet counts were down at 85,000. Will refer to hematology for further workup.  Not iron deficiency anemia.

## 2023-01-27 NOTE — ASSESSMENT & PLAN NOTE
Patient reports drinking 6-8 beers daily.  Patient's GGT was elevated at 80.  ALT was 69 and AST was 45 slightly elevated the normal and phos.  Recommended that she stopped drinking but patient is unwilling to stop at this time.

## 2023-01-27 NOTE — ASSESSMENT & PLAN NOTE
Recurrent thrombocytopenia with most recent platelet count of 80,000. Will refer to Hematology for further workup.

## 2023-01-27 NOTE — ASSESSMENT & PLAN NOTE
Weakness likely secondary to her anemia.  Will refer to Hematology.  Patient also has history of alcohol or substance abuse which may be contributing to her fatigue.  Will check labs on her to include a TSH and T4.

## 2023-02-08 DIAGNOSIS — D64.9 ANEMIA, UNSPECIFIED TYPE: Primary | ICD-10-CM

## 2023-02-08 DIAGNOSIS — F32.A DEPRESSION, UNSPECIFIED DEPRESSION TYPE: ICD-10-CM

## 2023-02-08 RX ORDER — VORTIOXETINE 10 MG/1
TABLET, FILM COATED ORAL
Qty: 30 TABLET | Refills: 1 | Status: SHIPPED | OUTPATIENT
Start: 2023-02-08 | End: 2023-04-30

## 2023-02-09 NOTE — TELEPHONE ENCOUNTER
Pt called on 02/08/2023 c/o continued dizziness and extremity weakness since her last visit. She requested to have her labs rechecked for anemia. Explained to the Pt that Dr. Ravi's plan was for the patient to see Hemotology to find out the cause of her anemia since it is not caused by iron deficiency. Pt stated she has an appointment with a hemotologist on 2/22/2023, but wants her labs rechecked. Pt denies any blood in her stool or urine.     Discussed Pt's request with Dr. Ravi and received a VO for a repeat CBC to check hemoglobin and hematocrit, and for Pt to keep her upcoming appt with hemotology. Notified Pt and she voiced understanding.

## 2023-02-11 ENCOUNTER — HOSPITAL ENCOUNTER (INPATIENT)
Facility: HOSPITAL | Age: 68
LOS: 3 days | Discharge: HOME | DRG: 812 | End: 2023-02-14
Attending: EMERGENCY MEDICINE | Admitting: FAMILY MEDICINE
Payer: COMMERCIAL

## 2023-02-11 DIAGNOSIS — R06.02 SOB (SHORTNESS OF BREATH): ICD-10-CM

## 2023-02-11 DIAGNOSIS — Z86.2 HISTORY OF ITP: ICD-10-CM

## 2023-02-11 DIAGNOSIS — K92.2 UGIB (UPPER GASTROINTESTINAL BLEED): ICD-10-CM

## 2023-02-11 DIAGNOSIS — Z78.9 ALCOHOL USE: ICD-10-CM

## 2023-02-11 DIAGNOSIS — F32.A ANXIETY AND DEPRESSION: ICD-10-CM

## 2023-02-11 DIAGNOSIS — D50.0 IRON DEFICIENCY ANEMIA DUE TO CHRONIC BLOOD LOSS: ICD-10-CM

## 2023-02-11 DIAGNOSIS — D69.3 IDIOPATHIC THROMBOCYTOPENIC PURPURA: ICD-10-CM

## 2023-02-11 DIAGNOSIS — R06.02 SHORTNESS OF BREATH: ICD-10-CM

## 2023-02-11 DIAGNOSIS — E03.9 HYPOTHYROIDISM, UNSPECIFIED TYPE: ICD-10-CM

## 2023-02-11 DIAGNOSIS — D64.9 SYMPTOMATIC ANEMIA: ICD-10-CM

## 2023-02-11 DIAGNOSIS — I50.9 CHF (CONGESTIVE HEART FAILURE): ICD-10-CM

## 2023-02-11 DIAGNOSIS — I10 PRIMARY HYPERTENSION: ICD-10-CM

## 2023-02-11 DIAGNOSIS — F41.9 ANXIETY AND DEPRESSION: ICD-10-CM

## 2023-02-11 DIAGNOSIS — K92.2 GASTROINTESTINAL HEMORRHAGE, UNSPECIFIED GASTROINTESTINAL HEMORRHAGE TYPE: Primary | ICD-10-CM

## 2023-02-11 LAB
ABO + RH BLD: NORMAL
ABO + RH BLD: NORMAL
ALBUMIN SERPL BCP-MCNC: 3.5 G/DL (ref 3.5–5)
ALBUMIN/GLOB SERPL: 1 {RATIO}
ALP SERPL-CCNC: 80 U/L (ref 55–142)
ALT SERPL W P-5'-P-CCNC: 77 U/L (ref 13–56)
ANION GAP SERPL CALCULATED.3IONS-SCNC: 11 MMOL/L (ref 7–16)
ANISOCYTOSIS BLD QL SMEAR: ABNORMAL
APTT PPP: 30.9 SECONDS (ref 25.2–37.3)
AST SERPL W P-5'-P-CCNC: 61 U/L (ref 15–37)
BASOPHILS # BLD AUTO: 0.06 K/UL (ref 0–0.2)
BASOPHILS NFR BLD AUTO: 0.9 % (ref 0–1)
BILIRUB SERPL-MCNC: 0.6 MG/DL (ref ?–1.2)
BLD PROD TYP BPU: NORMAL
BLD PROD TYP BPU: NORMAL
BLOOD UNIT EXPIRATION DATE: NORMAL
BLOOD UNIT EXPIRATION DATE: NORMAL
BLOOD UNIT TYPE CODE: 5100
BLOOD UNIT TYPE CODE: 5100
BUN SERPL-MCNC: 11 MG/DL (ref 7–18)
BUN/CREAT SERPL: 11 (ref 6–20)
CALCIUM SERPL-MCNC: 8.2 MG/DL (ref 8.5–10.1)
CHLORIDE SERPL-SCNC: 101 MMOL/L (ref 98–107)
CO2 SERPL-SCNC: 28 MMOL/L (ref 21–32)
CREAT SERPL-MCNC: 1.02 MG/DL (ref 0.55–1.02)
CROSSMATCH INTERPRETATION: NORMAL
CROSSMATCH INTERPRETATION: NORMAL
DIFFERENTIAL METHOD BLD: ABNORMAL
DISPENSE STATUS: NORMAL
DISPENSE STATUS: NORMAL
EGFR (NO RACE VARIABLE) (RUSH/TITUS): 60 ML/MIN/1.73M²
EOSINOPHIL # BLD AUTO: 0.1 K/UL (ref 0–0.5)
EOSINOPHIL NFR BLD AUTO: 1.5 % (ref 1–4)
ERYTHROCYTE [DISTWIDTH] IN BLOOD BY AUTOMATED COUNT: 20.9 % (ref 11.5–14.5)
ETHANOL, BLOOD (CATEGORY): NOT DETECTED
FERRITIN SERPL-MCNC: 6 NG/ML (ref 8–252)
FOLATE SERPL-MCNC: 19.1 NG/ML (ref 3.1–17.5)
GLOBULIN SER-MCNC: 3.5 G/DL (ref 2–4)
GLUCOSE SERPL-MCNC: 108 MG/DL (ref 74–106)
HCT VFR BLD AUTO: 18.5 % (ref 38–47)
HGB BLD-MCNC: 5.2 G/DL (ref 12–16)
HYPOCHROMIA BLD QL SMEAR: ABNORMAL
IMM GRANULOCYTES # BLD AUTO: 0.03 K/UL (ref 0–0.04)
IMM GRANULOCYTES NFR BLD: 0.5 % (ref 0–0.4)
INDIRECT COOMBS: NORMAL
INR BLD: 1.12
LYMPHOCYTES # BLD AUTO: 1.31 K/UL (ref 1–4.8)
LYMPHOCYTES NFR BLD AUTO: 20 % (ref 27–41)
MAGNESIUM SERPL-MCNC: 2.2 MG/DL (ref 1.7–2.3)
MCH RBC QN AUTO: 20.2 PG (ref 27–31)
MCHC RBC AUTO-ENTMCNC: 28.1 G/DL (ref 32–36)
MCV RBC AUTO: 71.7 FL (ref 80–96)
MICROCYTES BLD QL SMEAR: ABNORMAL
MONOCYTES # BLD AUTO: 0.63 K/UL (ref 0–0.8)
MONOCYTES NFR BLD AUTO: 9.6 % (ref 2–6)
MPC BLD CALC-MCNC: ABNORMAL G/DL
NEUTROPHILS # BLD AUTO: 4.42 K/UL (ref 1.8–7.7)
NEUTROPHILS NFR BLD AUTO: 67.5 % (ref 53–65)
NRBC # BLD AUTO: 0 X10E3/UL
NRBC, AUTO (.00): 0 %
PLATELET # BLD AUTO: 81 K/UL (ref 150–400)
PLATELET MORPHOLOGY: ABNORMAL
POC OCCULT BLOOD STOOL: POSITIVE
POLYCHROMASIA BLD QL SMEAR: ABNORMAL
POTASSIUM SERPL-SCNC: 3.7 MMOL/L (ref 3.5–5.1)
PROT SERPL-MCNC: 7 G/DL (ref 6.4–8.2)
PROTHROMBIN TIME: 14 SECONDS (ref 11.7–14.7)
RBC # BLD AUTO: 2.58 M/UL (ref 4.2–5.4)
RH BLD: NORMAL
SODIUM SERPL-SCNC: 136 MMOL/L (ref 136–145)
STOMATOCYTES BLD QL SMEAR: ABNORMAL
TROPONIN I SERPL HS-MCNC: 8.1 PG/ML
TROPONIN I SERPL HS-MCNC: 9.7 PG/ML
TSH SERPL DL<=0.005 MIU/L-ACNC: 1.88 UIU/ML (ref 0.36–3.74)
UNIT NUMBER: NORMAL
UNIT NUMBER: NORMAL
VIT B12 SERPL-MCNC: 420 PG/ML (ref 193–986)
WBC # BLD AUTO: 6.55 K/UL (ref 4.5–11)

## 2023-02-11 PROCEDURE — 99285 EMERGENCY DEPT VISIT HI MDM: CPT | Mod: 25

## 2023-02-11 PROCEDURE — 84484 ASSAY OF TROPONIN QUANT: CPT | Performed by: HOSPITALIST

## 2023-02-11 PROCEDURE — 82272 OCCULT BLD FECES 1-3 TESTS: CPT

## 2023-02-11 PROCEDURE — P9016 RBC LEUKOCYTES REDUCED: HCPCS | Performed by: HOSPITALIST

## 2023-02-11 PROCEDURE — 83550 IRON BINDING TEST: CPT | Performed by: EMERGENCY MEDICINE

## 2023-02-11 PROCEDURE — 25000003 PHARM REV CODE 250: Performed by: EMERGENCY MEDICINE

## 2023-02-11 PROCEDURE — 96376 TX/PRO/DX INJ SAME DRUG ADON: CPT

## 2023-02-11 PROCEDURE — 99223 PR INITIAL HOSPITAL CARE,LEVL III: ICD-10-PCS | Mod: ,,, | Performed by: HOSPITALIST

## 2023-02-11 PROCEDURE — 11000001 HC ACUTE MED/SURG PRIVATE ROOM

## 2023-02-11 PROCEDURE — 86923 COMPATIBILITY TEST ELECTRIC: CPT | Mod: 91 | Performed by: HOSPITALIST

## 2023-02-11 PROCEDURE — C9113 INJ PANTOPRAZOLE SODIUM, VIA: HCPCS | Performed by: EMERGENCY MEDICINE

## 2023-02-11 PROCEDURE — 85610 PROTHROMBIN TIME: CPT | Performed by: HOSPITALIST

## 2023-02-11 PROCEDURE — 99285 PR EMERGENCY DEPT VISIT,LEVEL V: ICD-10-PCS | Mod: ,,, | Performed by: EMERGENCY MEDICINE

## 2023-02-11 PROCEDURE — 86900 BLOOD TYPING SEROLOGIC ABO: CPT | Performed by: EMERGENCY MEDICINE

## 2023-02-11 PROCEDURE — 84484 ASSAY OF TROPONIN QUANT: CPT

## 2023-02-11 PROCEDURE — 99285 EMERGENCY DEPT VISIT HI MDM: CPT | Mod: ,,, | Performed by: EMERGENCY MEDICINE

## 2023-02-11 PROCEDURE — P9016 RBC LEUKOCYTES REDUCED: HCPCS | Performed by: EMERGENCY MEDICINE

## 2023-02-11 PROCEDURE — 85730 THROMBOPLASTIN TIME PARTIAL: CPT | Performed by: HOSPITALIST

## 2023-02-11 PROCEDURE — 80053 COMPREHEN METABOLIC PANEL: CPT | Performed by: EMERGENCY MEDICINE

## 2023-02-11 PROCEDURE — 85025 COMPLETE CBC W/AUTO DIFF WBC: CPT | Performed by: EMERGENCY MEDICINE

## 2023-02-11 PROCEDURE — 63600175 PHARM REV CODE 636 W HCPCS: Performed by: HOSPITALIST

## 2023-02-11 PROCEDURE — 82746 ASSAY OF FOLIC ACID SERUM: CPT | Performed by: HOSPITALIST

## 2023-02-11 PROCEDURE — 82728 ASSAY OF FERRITIN: CPT | Performed by: HOSPITALIST

## 2023-02-11 PROCEDURE — 82607 VITAMIN B-12: CPT | Performed by: HOSPITALIST

## 2023-02-11 PROCEDURE — 83540 ASSAY OF IRON: CPT | Performed by: EMERGENCY MEDICINE

## 2023-02-11 PROCEDURE — 63600175 PHARM REV CODE 636 W HCPCS: Performed by: EMERGENCY MEDICINE

## 2023-02-11 PROCEDURE — 96374 THER/PROPH/DIAG INJ IV PUSH: CPT

## 2023-02-11 PROCEDURE — 83735 ASSAY OF MAGNESIUM: CPT | Performed by: HOSPITALIST

## 2023-02-11 PROCEDURE — 82077 ASSAY SPEC XCP UR&BREATH IA: CPT | Performed by: EMERGENCY MEDICINE

## 2023-02-11 PROCEDURE — 25000003 PHARM REV CODE 250: Performed by: HOSPITALIST

## 2023-02-11 PROCEDURE — 86923 COMPATIBILITY TEST ELECTRIC: CPT | Performed by: EMERGENCY MEDICINE

## 2023-02-11 PROCEDURE — 36430 TRANSFUSION BLD/BLD COMPNT: CPT

## 2023-02-11 PROCEDURE — 99223 1ST HOSP IP/OBS HIGH 75: CPT | Mod: ,,, | Performed by: HOSPITALIST

## 2023-02-11 PROCEDURE — 84443 ASSAY THYROID STIM HORMONE: CPT | Performed by: HOSPITALIST

## 2023-02-11 RX ORDER — ONDANSETRON 2 MG/ML
8 INJECTION INTRAMUSCULAR; INTRAVENOUS EVERY 6 HOURS PRN
Status: DISCONTINUED | OUTPATIENT
Start: 2023-02-11 | End: 2023-02-14 | Stop reason: HOSPADM

## 2023-02-11 RX ORDER — SIMETHICONE 80 MG
1 TABLET,CHEWABLE ORAL 3 TIMES DAILY PRN
Status: DISCONTINUED | OUTPATIENT
Start: 2023-02-11 | End: 2023-02-14 | Stop reason: HOSPADM

## 2023-02-11 RX ORDER — TRAZODONE HYDROCHLORIDE 50 MG/1
100 TABLET ORAL NIGHTLY
Status: DISCONTINUED | OUTPATIENT
Start: 2023-02-11 | End: 2023-02-14 | Stop reason: HOSPADM

## 2023-02-11 RX ORDER — BISACODYL 5 MG
10 TABLET, DELAYED RELEASE (ENTERIC COATED) ORAL DAILY PRN
Status: DISCONTINUED | OUTPATIENT
Start: 2023-02-11 | End: 2023-02-14 | Stop reason: HOSPADM

## 2023-02-11 RX ORDER — PANTOPRAZOLE SODIUM 40 MG/10ML
80 INJECTION, POWDER, LYOPHILIZED, FOR SOLUTION INTRAVENOUS
Status: COMPLETED | OUTPATIENT
Start: 2023-02-11 | End: 2023-02-11

## 2023-02-11 RX ORDER — HYDROCODONE BITARTRATE AND ACETAMINOPHEN 500; 5 MG/1; MG/1
TABLET ORAL
Status: DISCONTINUED | OUTPATIENT
Start: 2023-02-11 | End: 2023-02-14 | Stop reason: HOSPADM

## 2023-02-11 RX ORDER — GUAIFENESIN/DEXTROMETHORPHAN 100-10MG/5
10 SYRUP ORAL EVERY 6 HOURS PRN
Status: DISCONTINUED | OUTPATIENT
Start: 2023-02-11 | End: 2023-02-14 | Stop reason: HOSPADM

## 2023-02-11 RX ORDER — TALC
6 POWDER (GRAM) TOPICAL NIGHTLY PRN
Status: DISCONTINUED | OUTPATIENT
Start: 2023-02-11 | End: 2023-02-14 | Stop reason: HOSPADM

## 2023-02-11 RX ORDER — LEVOTHYROXINE SODIUM 125 UG/1
125 TABLET ORAL DAILY
Status: DISCONTINUED | OUTPATIENT
Start: 2023-02-12 | End: 2023-02-14 | Stop reason: HOSPADM

## 2023-02-11 RX ORDER — ACETAMINOPHEN 500 MG
1000 TABLET ORAL EVERY 6 HOURS PRN
Status: DISCONTINUED | OUTPATIENT
Start: 2023-02-11 | End: 2023-02-14 | Stop reason: HOSPADM

## 2023-02-11 RX ORDER — DIPHENHYDRAMINE HYDROCHLORIDE 50 MG/ML
50 INJECTION INTRAMUSCULAR; INTRAVENOUS ONCE
Status: COMPLETED | OUTPATIENT
Start: 2023-02-11 | End: 2023-02-11

## 2023-02-11 RX ORDER — TRAZODONE HYDROCHLORIDE 50 MG/1
50 TABLET ORAL NIGHTLY PRN
Status: DISCONTINUED | OUTPATIENT
Start: 2023-02-11 | End: 2023-02-11

## 2023-02-11 RX ADMIN — PANTOPRAZOLE SODIUM 8 MG/HR: 40 INJECTION, POWDER, FOR SOLUTION INTRAVENOUS at 05:02

## 2023-02-11 RX ADMIN — PANTOPRAZOLE SODIUM 80 MG: 40 INJECTION, POWDER, FOR SOLUTION INTRAVENOUS at 02:02

## 2023-02-11 RX ADMIN — SODIUM CHLORIDE: 9 INJECTION, SOLUTION INTRAVENOUS at 11:02

## 2023-02-11 RX ADMIN — PANTOPRAZOLE SODIUM 8 MG/HR: 40 INJECTION, POWDER, FOR SOLUTION INTRAVENOUS at 11:02

## 2023-02-11 RX ADMIN — PANTOPRAZOLE SODIUM 8 MG/HR: 40 INJECTION, POWDER, FOR SOLUTION INTRAVENOUS at 03:02

## 2023-02-11 RX ADMIN — DIPHENHYDRAMINE HYDROCHLORIDE 50 MG: 50 INJECTION INTRAMUSCULAR; INTRAVENOUS at 11:02

## 2023-02-11 RX ADMIN — TRAZODONE HYDROCHLORIDE 100 MG: 50 TABLET ORAL at 09:02

## 2023-02-11 NOTE — ED PROVIDER NOTES
"Encounter Date: 2023       History     Chief Complaint   Patient presents with    Fatigue     Patient is a 68 yo female who presents to ED with c/o generalized weakness, fatigue, shortness of breath over the past week and low blood counts per PCP. Patient denies any abdominal or chest pain. She reports dark stool over the past several days. Denies use of NSAIDs. Daily use of ETOH; 2 can cocktails daily on average. Hx of ITP. Denies ever having EGD.     Followed by Dr. Robles at [facility] for cardiology. Denies use of blood thinners.     The history is provided by the patient.   Review of patient's allergies indicates:   Allergen Reactions    Lisinopril Other (See Comments)     thrombocytopenia     Past Medical History:   Diagnosis Date    Anemia     Anxiety disorder, unspecified     CHF (congestive heart failure)     Depression     GERD (gastroesophageal reflux disease)     History of alcohol abuse     Hyperlipidemia     Hypertension     Hypothyroidism     Idiopathic thrombocytopenic purpura     Left bundle branch block     Obesity     Osteomalacia     Osteoporosis     Pernicious anemia      Past Surgical History:   Procedure Laterality Date    CATARACT EXTRACTION Bilateral      SECTION      HYSTERECTOMY      THYROIDECTOMY       Family History   Problem Relation Age of Onset    Lung cancer Father     Osteoporosis Mother     No Known Problems Sister     No Known Problems Brother     Lupus Daughter     No Known Problems Son     No Known Problems Maternal Grandmother     No Known Problems Maternal Grandfather     No Known Problems Paternal Grandmother     No Known Problems Paternal Grandfather      Social History     Tobacco Use    Smoking status: Every Day     Packs/day: 0.50     Types: Vaping with nicotine, Cigarettes     Start date:      Last attempt to quit: 2005     Years since quittin.1    Smokeless tobacco: Never    Tobacco comments:     Former cigarette smoker, smoked "off and on" " from 0816-9035. Quit in 2005 then picked up vaping in 2014.    Substance Use Topics    Alcohol use: Yes     Comment: lucio in a can-1 per night    Drug use: Never     Review of Systems   Constitutional:  Positive for activity change, chills and fatigue. Negative for appetite change, diaphoresis, fever and unexpected weight change.   Respiratory:  Positive for shortness of breath. Negative for cough and wheezing.    Cardiovascular:  Negative for chest pain, palpitations and leg swelling.   Gastrointestinal:  Negative for abdominal pain, diarrhea, nausea and vomiting.   Musculoskeletal:  Negative for gait problem.   Neurological:  Positive for weakness (generalized).   Psychiatric/Behavioral:  Negative for confusion.      Physical Exam     Initial Vitals [02/11/23 1415]   BP Pulse Resp Temp SpO2   (!) 160/49 80 18 97.6 °F (36.4 °C) 99 %      MAP       --         Physical Exam    Vitals reviewed.  Constitutional: She appears well-developed and well-nourished.   Eyes: Pupils are equal, round, and reactive to light.   Neck:   Normal range of motion.  Cardiovascular:  Normal rate, regular rhythm, normal heart sounds and intact distal pulses.           Pulmonary/Chest: Breath sounds normal. No respiratory distress.   Abdominal: Abdomen is soft. Bowel sounds are normal. There is no abdominal tenderness.   Musculoskeletal:         General: No tenderness or edema. Normal range of motion.      Cervical back: Normal range of motion.     Neurological: She is alert and oriented to person, place, and time. GCS score is 15. GCS eye subscore is 4. GCS verbal subscore is 5. GCS motor subscore is 6.   Skin: Skin is warm and dry. There is pallor.   Psychiatric: She has a normal mood and affect.       Medical Screening Exam   See Full Note    ED Course   Procedures  Labs Reviewed   COMPREHENSIVE METABOLIC PANEL - Abnormal; Notable for the following components:       Result Value    Glucose 108 (*)     Calcium 8.2 (*)     ALT 77 (*)      AST 61 (*)     All other components within normal limits   CBC WITH DIFFERENTIAL - Abnormal; Notable for the following components:    RBC 2.58 (*)     Hemoglobin 5.2 (*)     Hematocrit 18.5 (*)     MCV 71.7 (*)     MCH 20.2 (*)     MCHC 28.1 (*)     RDW 20.9 (*)     Platelet Count 81 (*)     Neutrophils % 67.5 (*)     Lymphocytes % 20.0 (*)     Monocytes % 9.6 (*)     Immature Granulocytes % 0.5 (*)     All other components within normal limits   MANUAL DIFFERENTIAL - Abnormal; Notable for the following components:    Platelet Morphology Decreased (*)     All other components within normal limits   POCT OCCULT BLOOD (STOOL) - Abnormal; Notable for the following components:    Fecal Occult Blood Positive (*)     All other components within normal limits   TROPONIN I - Normal   CBC W/ AUTO DIFFERENTIAL    Narrative:     The following orders were created for panel order CBC auto differential.  Procedure                               Abnormality         Status                     ---------                               -----------         ------                     CBC with Differential[[id number]]        Abnormal            Final result               Manual Differential[[id number]]          Abnormal            Final result                 Please view results for these tests on the individual orders.   ALCOHOL,MEDICAL (ETHANOL)   IRON AND TIBC   TYPE & SCREEN   PREPARE RBC SOFT          Imaging Results              X-Ray Chest AP Portable (Final result)  Result time 02/11/23 15:49:43      Final result by Deshaun Harper MD (02/11/23 15:49:43)                   Impression:      No acute cardiopulmonary process.    Place of service: [facility]      Electronically signed by: Deshaun Harper  Date:    02/11/2023  Time:    15:49               Narrative:    EXAMINATION:  XR CHEST AP PORTABLE    CLINICAL HISTORY:  shortness of breath;    COMPARISON:  Prior radiograph 03/24/2016    FINDINGS:  The  cardiomediastinal silhouette is within normal limits. The lungs are clear with stable blunting of the right costophrenic angle suggestive of mild pleural reaction/scarring..  There is no pneumothorax or pleural effusion.    There is no acute osseous or soft tissue abnormality.                                       Medications   pantoprazole (PROTONIX) 40 mg in sodium chloride 0.9 % 100 mL IVPB (MB+) (8 mg/hr Intravenous New Bag 2/11/23 1505)   0.9%  NaCl infusion (for blood administration) (has no administration in time range)   pantoprazole injection 80 mg (80 mg Intravenous Given 2/11/23 1448)     Medical Decision Making:   ED Management:  MDM    Patient presents for emergent evaluation of acute rectal bleeding generalized weakness that poses a threat to life and/or bodily function.    In the ED patient found to have acute GI bleed severe anemia.    I ordered labs and personally reviewed them.  Labs significant for severe anemia.    I ordered X-rays and personally reviewed them and reviewed the radiologist interpretation.  Xray significant for no acute abnormality.      Admission MDM  I discussed the patient presentation  findings with the consultant for hospital medicine (speciality).    Patient was managed in the ED with IV blood.    The response to treatment was stable.    Patient required emergent consultation to Hospital Medicine (admitting physician) for admission.           Attending Attestation:     Physician Attestation Statement for NP/PA:   I have conducted a face to face encounter with this patient in addition to the NP/PA, due to Medical Complexity            ED Course as of 02/11/23 1737   Sat Feb 11, 2023   1508 Patient evaluated by ED physician and by nurse practitioner.  Patient reports dark stools for the past several days and does drink alcohol regularly.  Patient has not had a EGD previously.  She has no abdominal pain.  No abdominal tenderness.  She does have a history of ITP and has followed  with the hematologist for IV infusions.  She was told to go to the ER today because her blood count yesterday showed a hemoglobin of 5.  Platelet count was near baseline. [PK]      ED Course User Index  [PK] Deshaun Nichols MD          Clinical Impression:   Final diagnoses:  [R06.02] Shortness of breath  [K92.2] Gastrointestinal hemorrhage, unspecified gastrointestinal hemorrhage type (Primary)  [Z86.2] History of ITP  [Z78.9] Alcohol use        ED Disposition Condition    Admit Stable                Deshaun Nichols MD  02/11/23 1737       Deshaun Nichols MD  02/11/23 1737

## 2023-02-11 NOTE — NURSING
Pt resting quietly, eyes closed, chest rising and falling. NADN. Protonix IV infusing to the right AC with no signs of infiltration. Safety measures maintained. Call bell within reach. Will monitor.

## 2023-02-11 NOTE — LETTER
February 14, 2023    Ene Martin  [address] [facility] [place] [address]                   [address] [place] [address] [place] [address]  Phone: [phone]  Fax: [phone]   February 14, 2023     Patient: Ene Martin   YOB: 1955   Date of Visit: 2/11/2023       To Whom it May Concern:    Ene Martin was in hosptial on 2/11/2023. She can return to work on 02/16/2023.    Please excuse her from any classes or work missed.    If you have any questions or concerns, please don't hesitate to call.    Sincerely,         Micky Manrique RN

## 2023-02-11 NOTE — ED TRIAGE NOTES
Pt reports getting a call from the clinic telling the to come to the ER because her blood levels were low. Pt also reports weakness.

## 2023-02-12 PROBLEM — I44.7 LEFT BUNDLE BRANCH BLOCK: Status: ACTIVE | Noted: 2023-02-12

## 2023-02-12 PROBLEM — D64.9 SYMPTOMATIC ANEMIA: Status: ACTIVE | Noted: 2023-02-12

## 2023-02-12 PROBLEM — Z78.9 ALCOHOL USE: Status: ACTIVE | Noted: 2023-02-12

## 2023-02-12 PROBLEM — Z86.2 HISTORY OF ITP: Status: ACTIVE | Noted: 2023-02-12

## 2023-02-12 LAB
ANISOCYTOSIS BLD QL SMEAR: ABNORMAL
AORTIC VALVE CUSP SEPERATION: 1.94 CM
AV INDEX (PROSTH): 0.6
AV MEAN GRADIENT: 6 MMHG
AV PEAK GRADIENT: 13 MMHG
AV VALVE AREA: 1.78 CM2
BASOPHILS # BLD AUTO: 0.06 K/UL (ref 0–0.2)
BASOPHILS NFR BLD AUTO: 1.3 % (ref 0–1)
BSA FOR ECHO PROCEDURE: 2 M2
CHOLEST SERPL-MCNC: 131 MG/DL (ref 0–200)
CHOLEST/HDLC SERPL: 3.4 {RATIO}
CV ECHO LV RWT: 0.54 CM
DIFFERENTIAL METHOD BLD: ABNORMAL
DOP CALC AO PEAK VEL: 1.8 M/S
DOP CALC AO VTI: 39.13 CM
DOP CALC LVOT AREA: 3 CM2
DOP CALC LVOT DIAMETER: 1.95 CM
DOP CALC LVOT STROKE VOLUME: 69.79 CM3
DOP CALCLVOT PEAK VEL VTI: 23.38 CM
E WAVE DECELERATION TIME: 196 MSEC
E/A RATIO: 1.21
E/E' RATIO: 13.41 M/S
ECHO LV POSTERIOR WALL: 1.26 CM (ref 0.6–1.1)
EJECTION FRACTION: 55 %
EOSINOPHIL # BLD AUTO: 0.19 K/UL (ref 0–0.5)
EOSINOPHIL NFR BLD AUTO: 4.2 % (ref 1–4)
ERYTHROCYTE [DISTWIDTH] IN BLOOD BY AUTOMATED COUNT: 22.5 % (ref 11.5–14.5)
FRACTIONAL SHORTENING: 48 % (ref 28–44)
HCT VFR BLD AUTO: 25 % (ref 38–47)
HDLC SERPL-MCNC: 38 MG/DL (ref 40–60)
HGB BLD-MCNC: 7.7 G/DL (ref 12–16)
HYPOCHROMIA BLD QL SMEAR: ABNORMAL
IMM GRANULOCYTES # BLD AUTO: 0.01 K/UL (ref 0–0.04)
IMM GRANULOCYTES NFR BLD: 0.2 % (ref 0–0.4)
INTERVENTRICULAR SEPTUM: 1.18 CM (ref 0.6–1.1)
IVC DIAMETER: 1.7 CM
LDLC SERPL CALC-MCNC: 70 MG/DL
LDLC/HDLC SERPL: 1.8 {RATIO}
LEFT ATRIUM SIZE: 3.94 CM
LEFT INTERNAL DIMENSION IN SYSTOLE: 2.44 CM (ref 2.1–4)
LEFT VENTRICLE DIASTOLIC VOLUME INDEX: 52.24 ML/M2
LEFT VENTRICLE DIASTOLIC VOLUME: 101.35 ML
LEFT VENTRICLE MASS INDEX: 111 G/M2
LEFT VENTRICLE SYSTOLIC VOLUME INDEX: 12.6 ML/M2
LEFT VENTRICLE SYSTOLIC VOLUME: 24.37 ML
LEFT VENTRICULAR INTERNAL DIMENSION IN DIASTOLE: 4.68 CM (ref 3.5–6)
LEFT VENTRICULAR MASS: 215.62 G
LV LATERAL E/E' RATIO: 12.67 M/S
LV SEPTAL E/E' RATIO: 14.25 M/S
LYMPHOCYTES # BLD AUTO: 1.65 K/UL (ref 1–4.8)
LYMPHOCYTES NFR BLD AUTO: 36.3 % (ref 27–41)
MCH RBC QN AUTO: 24.2 PG (ref 27–31)
MCHC RBC AUTO-ENTMCNC: 30.8 G/DL (ref 32–36)
MCV RBC AUTO: 78.6 FL (ref 80–96)
MICROCYTES BLD QL SMEAR: ABNORMAL
MONOCYTES # BLD AUTO: 0.55 K/UL (ref 0–0.8)
MONOCYTES NFR BLD AUTO: 12.1 % (ref 2–6)
MPC BLD CALC-MCNC: ABNORMAL G/DL
MV PEAK A VEL: 0.94 M/S
MV PEAK E VEL: 1.14 M/S
NEUTROPHILS # BLD AUTO: 2.09 K/UL (ref 1.8–7.7)
NEUTROPHILS NFR BLD AUTO: 45.9 % (ref 53–65)
NONHDLC SERPL-MCNC: 93 MG/DL
NRBC # BLD AUTO: 0.02 X10E3/UL
NRBC, AUTO (.00): 0.4 %
NT-PROBNP SERPL-MCNC: 578 PG/ML (ref 1–125)
PISA TR MAX VEL: 2.85 M/S
PLATELET # BLD AUTO: 64 K/UL (ref 150–400)
PLATELET MORPHOLOGY: ABNORMAL
POLYCHROMASIA BLD QL SMEAR: ABNORMAL
RBC # BLD AUTO: 3.18 M/UL (ref 4.2–5.4)
STOMATOCYTES BLD QL SMEAR: ABNORMAL
TDI LATERAL: 0.09 M/S
TDI SEPTAL: 0.08 M/S
TDI: 0.09 M/S
TR MAX PG: 32 MMHG
TRIGL SERPL-MCNC: 117 MG/DL (ref 35–150)
VLDLC SERPL-MCNC: 23 MG/DL
WBC # BLD AUTO: 4.55 K/UL (ref 4.5–11)

## 2023-02-12 PROCEDURE — 11000001 HC ACUTE MED/SURG PRIVATE ROOM

## 2023-02-12 PROCEDURE — 63600175 PHARM REV CODE 636 W HCPCS: Performed by: EMERGENCY MEDICINE

## 2023-02-12 PROCEDURE — 25000003 PHARM REV CODE 250: Performed by: HOSPITALIST

## 2023-02-12 PROCEDURE — 63600175 PHARM REV CODE 636 W HCPCS: Performed by: HOSPITALIST

## 2023-02-12 PROCEDURE — 93010 ELECTROCARDIOGRAM REPORT: CPT | Mod: ,,, | Performed by: HOSPITALIST

## 2023-02-12 PROCEDURE — 99232 PR SUBSEQUENT HOSPITAL CARE,LEVL II: ICD-10-PCS | Mod: ,,, | Performed by: FAMILY MEDICINE

## 2023-02-12 PROCEDURE — 93010 EKG 12-LEAD: ICD-10-PCS | Mod: ,,, | Performed by: HOSPITALIST

## 2023-02-12 PROCEDURE — 25000003 PHARM REV CODE 250: Performed by: INTERNAL MEDICINE

## 2023-02-12 PROCEDURE — 93005 ELECTROCARDIOGRAM TRACING: CPT

## 2023-02-12 PROCEDURE — 99232 SBSQ HOSP IP/OBS MODERATE 35: CPT | Mod: ,,, | Performed by: FAMILY MEDICINE

## 2023-02-12 PROCEDURE — 25000003 PHARM REV CODE 250: Performed by: EMERGENCY MEDICINE

## 2023-02-12 PROCEDURE — 80061 LIPID PANEL: CPT | Performed by: HOSPITALIST

## 2023-02-12 PROCEDURE — 83880 ASSAY OF NATRIURETIC PEPTIDE: CPT | Performed by: HOSPITALIST

## 2023-02-12 PROCEDURE — 25500020 PHARM REV CODE 255: Performed by: FAMILY MEDICINE

## 2023-02-12 PROCEDURE — 85025 COMPLETE CBC W/AUTO DIFF WBC: CPT | Performed by: HOSPITALIST

## 2023-02-12 PROCEDURE — C9113 INJ PANTOPRAZOLE SODIUM, VIA: HCPCS | Performed by: EMERGENCY MEDICINE

## 2023-02-12 RX ORDER — POLYETHYLENE GLYCOL 3350, SODIUM SULFATE ANHYDROUS, SODIUM BICARBONATE, SODIUM CHLORIDE, POTASSIUM CHLORIDE 236; 22.74; 6.74; 5.86; 2.97 G/4L; G/4L; G/4L; G/4L; G/4L
4000 POWDER, FOR SOLUTION ORAL ONCE
Status: COMPLETED | OUTPATIENT
Start: 2023-02-12 | End: 2023-02-12

## 2023-02-12 RX ADMIN — PANTOPRAZOLE SODIUM 8 MG/HR: 40 INJECTION, POWDER, FOR SOLUTION INTRAVENOUS at 11:02

## 2023-02-12 RX ADMIN — POLYETHYLENE GLYCOL 3350, SODIUM SULFATE ANHYDROUS, SODIUM BICARBONATE, SODIUM CHLORIDE, POTASSIUM CHLORIDE 4000 ML: 236; 22.74; 6.74; 5.86; 2.97 POWDER, FOR SOLUTION ORAL at 04:02

## 2023-02-12 RX ADMIN — PANTOPRAZOLE SODIUM 8 MG/HR: 40 INJECTION, POWDER, FOR SOLUTION INTRAVENOUS at 04:02

## 2023-02-12 RX ADMIN — PERFLUTREN 1.5 ML: 6.52 INJECTION, SUSPENSION INTRAVENOUS at 08:02

## 2023-02-12 RX ADMIN — FOLIC ACID: 5 INJECTION, SOLUTION INTRAMUSCULAR; INTRAVENOUS; SUBCUTANEOUS at 09:02

## 2023-02-12 RX ADMIN — LEVOTHYROXINE SODIUM 125 MCG: 125 TABLET ORAL at 09:02

## 2023-02-12 RX ADMIN — PANTOPRAZOLE SODIUM 8 MG/HR: 40 INJECTION, POWDER, FOR SOLUTION INTRAVENOUS at 06:02

## 2023-02-12 NOTE — ASSESSMENT & PLAN NOTE
Patient told she has CHF and abnormal EKG.    Will check echo in AM.  EKG LBBB and trops normal.    Will need follow up with Dr. Casiano.  She was to see Dr. Carreon but she wishes to keep her records and doctors at [facility].    Patient given information on scheduling follow up appointment with Dr. Casiano.

## 2023-02-12 NOTE — HPI
66 yo F, who appears younger than stated age, presents to the ED with c/o generalized weakness and worsening SOB.  She states that it began with exertion ut now has progressed to even at rest.  She was told she had CHF and has an appointment with Dr. Carreon to get an echo week after next but she was just too weak and dyspneic to wait until next week.      In the ED she was found to have a microcytic anemia with hgb 5.  She states that she is not on any blood thinners but she has noted dark stools but takes iron for previous ARIANA and B12 for previous pernicious anemia.  She has had no noted bleeding or N/V/D or constipation.  She has no abdominal pain or dysphagia or unexplained weight loss.  She does take a PPI at home.  She also states that she was diagnosed with ITP several years ago by Dr. Tristen Alvarez when her platelets were 40 K (currently 80 K) but she has not had a problem in years and noted no easy bruising or rash like lesions.

## 2023-02-12 NOTE — ASSESSMENT & PLAN NOTE
Most likely from chronic blood loss.    Anemia panel pending.    Transfusing.    Most likely the source of her worsening SOB but has appointment to be evaluated for CHF as OP and will order echo and have her follow with Dr. Casiano.  Will check a proBNP and fasting lipids for completeness.

## 2023-02-12 NOTE — ASSESSMENT & PLAN NOTE
Anemia panel pending.  Heme positive  Transfusing 2 UpRBC to keep hgb above 8 in case of procedure  MIKHAIL for DVT prophylaxis. No anticoagulation due to bleed.    Appreciate GI assistance and eval for endoscopy.    Continue IV PPI.

## 2023-02-12 NOTE — CONSULTS
[facility]   Gastroenterology  Consult Note    Patient Name: Ene Martin  MRN: [id number]  Admission Date: 2023  Hospital Length of Stay: 1 days  Code Status: Full Code   Attending Provider: Tabitha Zurita DO   Consulting Provider: Henri Castelan MD  Primary Care Physician: Dagoberto Ravi DO  Principal Problem:UGIB (upper gastrointestinal bleed)    Inpatient consult to Gastroenterology  Consult performed by: Henri Castelan MD  Consult ordered by: Haydee Farris MD  Reason for consult: Anemia  Assessment/Recommendations: -EGD and colonoscopy tomorrow  -Monitor HnH      Subjective:     HPI: 66 yo F, with PMH of ITP admitted with generalized weakness and worsening SOB.    In the ED she was found to have hgb 5.  Denies NSAIDs or any blood thinners. She has noted dark stools over last few months but takes iron   Denies GI bleeding or N/V/D or constipation.  She has no abdominal pain or dysphagia or        Past Medical History:   Diagnosis Date    Anxiety and depression     ETOH abuse 10/24/2022    GERD (gastroesophageal reflux disease)     Hyperlipidemia     Hypertension     Hypothyroidism     Idiopathic thrombocytopenic purpura     Insomnia 2022    Iron deficiency anemia 2023    Left bundle branch block     Osteoporosis     Pernicious anemia        Past Surgical History:   Procedure Laterality Date    CATARACT EXTRACTION Bilateral      SECTION      HYSTERECTOMY      THYROIDECTOMY         Review of patient's allergies indicates:   Allergen Reactions    Lisinopril Other (See Comments)     thrombocytopenia     Family History       Problem Relation (Age of Onset)    Lung cancer Father    Lupus Daughter    No Known Problems Sister, Brother, Son, Maternal Grandmother, Maternal Grandfather, Paternal Grandmother, Paternal Grandfather    Osteoporosis Mother          Tobacco Use    Smoking status: Every Day     Packs/day: 0.50     Types: Vaping with  "nicotine, Cigarettes     Start date:      Last attempt to quit:      Years since quittin.1    Smokeless tobacco: Never    Tobacco comments:     Former cigarette smoker, smoked "off and on" from 4625-6089. Quit in  then picked up vaping in .    Substance and Sexual Activity    Alcohol use: Yes     Comment: lucio in a can-1 per night    Drug use: Never    Sexual activity: Yes     Partners: Male     Birth control/protection: See Surgical Hx     Review of Systems  Constitutional:  Positive for fatigue. Negative for appetite change, chills, fever and unexpected weight change.   HENT:  Negative for congestion, mouth sores, nosebleeds, sinus pain, sore throat and trouble swallowing.    Eyes:  Negative for visual disturbance.   Respiratory:  Positive for shortness of breath. Negative for apnea, cough and chest tightness.    Cardiovascular:  Negative for chest pain, palpitations and leg swelling.   Gastrointestinal:  Negative for abdominal pain, blood in stool, constipation, diarrhea, nausea and vomiting.   Endocrine: Negative for polyuria.   Genitourinary:  Negative for decreased urine volume, difficulty urinating, dysuria and frequency.   Musculoskeletal:  Negative for arthralgias, back pain and neck pain.   Skin:  Negative for rash.   Neurological:  Positive for weakness and light-headedness. Negative for syncope and headaches.   Hematological:  Does not bruise/bleed easily.   Psychiatric/Behavioral:  Negative for confusion and suicidal ideas.    Objective:     Vital Signs (Most Recent):  Temp: 98.2 °F (36.8 °C) (23 1100)  Pulse: 84 (23 1100)  Resp: 18 (23 1100)  BP: (!) 141/52 (23 1100)  SpO2: (!) 92 % (23 1100)   Vital Signs (24h Range):  Temp:  [97 °F (36.1 °C)-98.8 °F (37.1 °C)] 98.2 °F (36.8 °C)  Pulse:  [70-90] 84  Resp:  [11-20] 18  SpO2:  [91 %-100 %] 92 %  BP: (110-168)/(34-63) 141/52     Weight: 88.5 kg (195 lb) (23 0700)  Body mass index is 33.47 " kg/m².      Intake/Output Summary (Last 24 hours) at 2/12/2023 1334  Last data filed at 2/12/2023 0330  Gross per 24 hour   Intake 466.67 ml   Output --   Net 466.67 ml       Lines/Drains/Airways       Peripheral Intravenous Line  Duration                  Peripheral IV - Single Lumen 02/11/23 1448 20 G Right Antecubital <1 day         Peripheral IV - Single Lumen 02/11/23 1650 22 G Left Antecubital <1 day                    Physical Exam    Vitals and nursing note reviewed. Exam conducted with a chaperone present.   Constitutional:       Appearance: Normal appearance.   HENT:      Head: Atraumatic.      Mouth/Throat:      Mouth: Mucous membranes are moist.      Pharynx: Oropharynx is clear.   Eyes:      Conjunctiva/sclera: Conjunctivae normal.      Pupils: Pupils are equal, round, and reactive to light.   Neck:      Vascular: No carotid bruit.   Cardiovascular:      Rate and Rhythm: Normal rate and regular rhythm.      Pulses: Normal pulses.      Heart sounds: Normal heart sounds.   Pulmonary:      Effort: Pulmonary effort is normal.      Breath sounds: Normal breath sounds.   Abdominal:      General: Abdomen is flat. Bowel sounds are normal. There is no distension.      Palpations: Abdomen is soft. There is no mass.      Tenderness: There is no abdominal tenderness. There is no guarding or rebound.   Musculoskeletal:         General: No deformity or signs of injury. Normal range of motion.      Cervical back: Neck supple.      Right lower leg: No edema.      Left lower leg: No edema.   Skin:     General: Skin is warm and dry.      Capillary Refill: Capillary refill takes 2 to 3 seconds.      Coloration: Skin is pale. Skin is not jaundiced.      Findings: No bruising, lesion or rash.   Neurological:      General: No focal deficit present.      Mental Status: She is alert and oriented to person, place, and time.   Psychiatric:         Mood and Affect: Mood normal.   Significant Labs:  CBC:   Recent Labs   Lab  02/11/23  1500 02/12/23  0405   WBC 6.55 4.55   HGB 5.2* 7.7*   HCT 18.5* 25.0*   PLT 81* 64*     CMP:   Recent Labs   Lab 02/11/23  1500   *   CALCIUM 8.2*   ALBUMIN 3.5   PROT 7.0      K 3.7   CO2 28      BUN 11   CREATININE 1.02   ALKPHOS 80   ALT 77*   AST 61*   BILITOT 0.6     Coagulation:   Recent Labs   Lab 02/11/23  1926   INR 1.12   APTT 30.9     All pertinent lab results from the last 24 hours have been reviewed.    Significant Imaging:  Imaging results within the past 24 hours have been reviewed.    Assessment/Plan:     Active Diagnoses:    Diagnosis Date Noted POA    PRINCIPAL PROBLEM:  UGIB (upper gastrointestinal bleed) [K92.2] 02/11/2023 Yes    Left bundle branch block [I44.7] 02/12/2023 Yes    Alcohol use [Z78.9] 02/12/2023 Yes    History of ITP [Z86.2] 02/12/2023 Not Applicable    Symptomatic anemia [D64.9] 02/12/2023 Yes    Pernicious anemia [D51.0]  Yes    Iron deficiency anemia [D50.9] 01/18/2023 Yes    ETOH abuse [F10.10] 10/24/2022 Yes    Idiopathic thrombocytopenic purpura [D69.3] 07/19/2022 Yes    Insomnia [G47.00] 07/19/2022 Yes    Hypothyroidism [E03.9] 07/19/2022 Yes    Anxiety and depression [F41.9, F32.A]  Yes    Hypertension [I10]  Yes      Problems Resolved During this Admission:       Anemia with Positive FOBT  -No active GI bleed  -Colonoscopy > 10 years, Never had EGD  -Plan EGD and colonoscopy after bowel prep  -Avoid NSAIds  Monitor HnH  -Transfuse if Hb <7, Avoid Over transfusion  -Continue Protonix    Thrombocytopenia  -ITP related  Stable    Rest Management as per Primary team      Thank you for your consult. I will follow-up with patient. Please contact us if you have any additional questions.    Henri Castelan MD  Gastroenterology

## 2023-02-12 NOTE — SUBJECTIVE & OBJECTIVE
Past Medical History:   Diagnosis Date    Anxiety and depression     Depression     ETOH abuse 10/24/2022    GERD (gastroesophageal reflux disease)     Hyperlipidemia     Hypertension     Hypothyroidism     Idiopathic thrombocytopenic purpura     Insomnia 2022    Iron deficiency anemia 2023    Left bundle branch block     Osteoporosis     Pernicious anemia        Past Surgical History:   Procedure Laterality Date    CATARACT EXTRACTION Bilateral      SECTION      HYSTERECTOMY      THYROIDECTOMY         Review of patient's allergies indicates:   Allergen Reactions    Lisinopril Other (See Comments)     thrombocytopenia       No current facility-administered medications on file prior to encounter.     Current Outpatient Medications on File Prior to Encounter   Medication Sig    BYSTOLIC 5 mg Tab Take 5 mg by mouth once daily.    calcium carbonate-vitamin D3 500 mg-3.125 mcg (125 unit) per tablet Take 1 tablet by mouth once daily.    cetirizine-pseudoephedrine 5-120 mg Tb12 Take 1 tablet by mouth once daily.    cholecalciferol, vitamin D3, 125 mcg (5,000 unit) Tab Take 5,000 Units by mouth once daily.    DUREZOL 0.05 % Drop ophthalmic solution Place 1 drop into the right eye 2 (two) times daily as needed.    ferrous sulfate (FEOSOL) 325 mg (65 mg iron) Tab tablet Take 325 mg by mouth 2 (two) times daily.    furosemide (LASIX) 20 MG tablet Take 40 mg by mouth once daily.    meclizine (ANTIVERT) 25 mg tablet Take 1 tablet (25 mg total) by mouth 3 (three) times daily as needed for Dizziness. (Patient not taking: Reported on 2023)    multivit-min/iron/folic/lutein (CENTRUM SILVER WOMEN ORAL) Take 1 tablet by mouth once daily.    mupirocin (BACTROBAN) 2 % ointment SMARTSIG:Sparingly Both Nares Twice Daily    ondansetron (ZOFRAN) 8 MG tablet Take 1 tablet (8 mg total) by mouth every 8 (eight) hours as needed for Nausea. (Patient not taking: Reported on 2023)    pantoprazole  "(PROTONIX) 40 MG tablet Take 40 mg by mouth once daily.    potassium chloride (KLOR-CON) 10 MEQ TbSR Take 10 mEq by mouth once daily.    SYNTHROID 125 mcg tablet Take 125 mcg by mouth once daily.    traZODone (DESYREL) 100 MG tablet Take 1 tablet (100 mg total) by mouth every evening.    TRINTELLIX 10 mg Tab TAKE 1 TABLET BY MOUTH ONCE DAILY     Family History       Problem Relation (Age of Onset)    Lung cancer Father    Lupus Daughter    No Known Problems Sister, Brother, Son, Maternal Grandmother, Maternal Grandfather, Paternal Grandmother, Paternal Grandfather    Osteoporosis Mother          Tobacco Use    Smoking status: Every Day     Packs/day: 0.50     Types: Vaping with nicotine, Cigarettes     Start date:      Last attempt to quit:      Years since quittin.1    Smokeless tobacco: Never    Tobacco comments:     Former cigarette smoker, smoked "off and on" from 9033-7720. Quit in  then picked up vaping in .    Substance and Sexual Activity    Alcohol use: Yes     Comment: lucio in a can-1 per night    Drug use: Never    Sexual activity: Yes     Partners: Male     Birth control/protection: See Surgical Hx     Review of Systems   Constitutional:  Positive for fatigue. Negative for appetite change, chills, fever and unexpected weight change.   HENT:  Negative for congestion, mouth sores, nosebleeds, sinus pain, sore throat and trouble swallowing.    Eyes:  Negative for visual disturbance.   Respiratory:  Positive for shortness of breath. Negative for apnea, cough and chest tightness.    Cardiovascular:  Negative for chest pain, palpitations and leg swelling.   Gastrointestinal:  Negative for abdominal pain, blood in stool, constipation, diarrhea, nausea and vomiting.   Endocrine: Negative for polyuria.   Genitourinary:  Negative for decreased urine volume, difficulty urinating, dysuria and frequency.   Musculoskeletal:  Negative for arthralgias, back pain and neck pain.   Skin:  Negative " for rash.   Neurological:  Positive for weakness and light-headedness. Negative for syncope and headaches.   Hematological:  Does not bruise/bleed easily.   Psychiatric/Behavioral:  Negative for confusion and suicidal ideas.    Objective:     Vital Signs (Most Recent):  Temp: 98.2 °F (36.8 °C) (02/12/23 0000)  Pulse: 87 (02/12/23 0000)  Resp: 18 (02/12/23 0000)  BP: (!) 111/49 (02/12/23 0000)  SpO2: 96 % (02/12/23 0000)   Vital Signs (24h Range):  Temp:  [97 °F (36.1 °C)-98.8 °F (37.1 °C)] 98.2 °F (36.8 °C)  Pulse:  [70-90] 87  Resp:  [11-20] 18  SpO2:  [93 %-100 %] 96 %  BP: (110-168)/(34-63) 111/49     Weight: 88.5 kg (195 lb)  Body mass index is 33.47 kg/m².    Physical Exam  Vitals and nursing note reviewed. Exam conducted with a chaperone present.   Constitutional:       Appearance: Normal appearance.   HENT:      Head: Atraumatic.      Mouth/Throat:      Mouth: Mucous membranes are moist.      Pharynx: Oropharynx is clear.   Eyes:      Conjunctiva/sclera: Conjunctivae normal.      Pupils: Pupils are equal, round, and reactive to light.   Neck:      Vascular: No carotid bruit.   Cardiovascular:      Rate and Rhythm: Normal rate and regular rhythm.      Pulses: Normal pulses.      Heart sounds: Normal heart sounds.   Pulmonary:      Effort: Pulmonary effort is normal.      Breath sounds: Normal breath sounds.   Abdominal:      General: Abdomen is flat. Bowel sounds are normal. There is no distension.      Palpations: Abdomen is soft. There is no mass.      Tenderness: There is no abdominal tenderness. There is no guarding or rebound.   Musculoskeletal:         General: No deformity or signs of injury. Normal range of motion.      Cervical back: Neck supple.      Right lower leg: No edema.      Left lower leg: No edema.   Skin:     General: Skin is warm and dry.      Capillary Refill: Capillary refill takes 2 to 3 seconds.      Coloration: Skin is pale. Skin is not jaundiced.      Findings: No bruising, lesion or  rash.   Neurological:      General: No focal deficit present.      Mental Status: She is alert and oriented to person, place, and time.   Psychiatric:         Mood and Affect: Mood normal.         CRANIAL NERVES     CN III, IV, VI   Pupils are equal, round, and reactive to light.     Significant Labs: All pertinent labs within the past 24 hours have been reviewed.    Significant Imaging: I have reviewed all pertinent imaging results/findings within the past 24 hours.

## 2023-02-12 NOTE — ASSESSMENT & PLAN NOTE
Will give banana bag and monitor closely.  Patient reported has a few drinks daily.    INR is normal but esophageal varices in the DDx.

## 2023-02-12 NOTE — ASSESSMENT & PLAN NOTE
Patient has recurrent depression which is unknown and is currently controlled. Will Continue anti-depressant medications. We will not consult psychiatry at this time. Patient does not display psychosis at this time. Continue to monitor closely and adjust plan of care as needed.    On trintellix and not on our formulary.  Can take home meds or restart at discharge.

## 2023-02-12 NOTE — NURSING
"2346 administered pre-infusion medications.  2346 initiated blood transfusion.   0050 pt asked to be disconnected from [employer] so she can get some sleep. Stressed importance of monitoring vitals during blood transfusion. States "I already had blood once today with no issues."   0330 transfusion complete. Routine labs set for 0430. No need for addition order for post infusion h/h to be ordered.    "

## 2023-02-12 NOTE — H&P
[facility]  [facility] Medicine  History & Physical    Patient Name: Ene Martin  MRN: [id number]  Patient Class: IP- Inpatient  Admission Date: 2/11/2023  Attending Physician: Tabitha Zurita DO   Primary Care Provider: Dagoberto Ravi DO         Patient information was obtained from patient, past medical records and ER records.     Subjective:     Principal Problem:UGIB (upper gastrointestinal bleed)    Chief Complaint:   Chief Complaint   Patient presents with    Fatigue        HPI: 68 yo F, who appears younger than stated age, presents to the ED with c/o generalized weakness and worsening SOB.  She states that it began with exertion ut now has progressed to even at rest.  She was told she had CHF and has an appointment with Dr. Carreon to get an echo week after next but she was just too weak and dyspneic to wait until next week.      In the ED she was found to have a microcytic anemia with hgb 5.  She states that she is not on any blood thinners but she has noted dark stools but takes iron for previous ARIANA and B12 for previous pernicious anemia.  She has had no noted bleeding or N/V/D or constipation.  She has no abdominal pain or dysphagia or unexplained weight loss.  She does take a PPI at home.  She also states that she was diagnosed with ITP several years ago by Dr. Tristen Alvarez when her platelets were 40 K (currently 80 K) but she has not had a problem in years and noted no easy bruising or rash like lesions.        Past Medical History:   Diagnosis Date    Anxiety and depression     Depression     ETOH abuse 10/24/2022    GERD (gastroesophageal reflux disease)     Hyperlipidemia     Hypertension     Hypothyroidism     Idiopathic thrombocytopenic purpura     Insomnia 07/19/2022    Iron deficiency anemia 01/18/2023    Left bundle branch block     Osteoporosis     Pernicious anemia        Past Surgical History:   Procedure Laterality Date    CATARACT EXTRACTION  Bilateral      SECTION      HYSTERECTOMY      THYROIDECTOMY         Review of patient's allergies indicates:   Allergen Reactions    Lisinopril Other (See Comments)     thrombocytopenia       No current facility-administered medications on file prior to encounter.     Current Outpatient Medications on File Prior to Encounter   Medication Sig    BYSTOLIC 5 mg Tab Take 5 mg by mouth once daily.    calcium carbonate-vitamin D3 500 mg-3.125 mcg (125 unit) per tablet Take 1 tablet by mouth once daily.    cetirizine-pseudoephedrine 5-120 mg Tb12 Take 1 tablet by mouth once daily.    cholecalciferol, vitamin D3, 125 mcg (5,000 unit) Tab Take 5,000 Units by mouth once daily.    DUREZOL 0.05 % Drop ophthalmic solution Place 1 drop into the right eye 2 (two) times daily as needed.    ferrous sulfate (FEOSOL) 325 mg (65 mg iron) Tab tablet Take 325 mg by mouth 2 (two) times daily.    furosemide (LASIX) 20 MG tablet Take 40 mg by mouth once daily.    meclizine (ANTIVERT) 25 mg tablet Take 1 tablet (25 mg total) by mouth 3 (three) times daily as needed for Dizziness. (Patient not taking: Reported on 2023)    multivit-min/iron/folic/lutein (CENTRUM SILVER WOMEN ORAL) Take 1 tablet by mouth once daily.    mupirocin (BACTROBAN) 2 % ointment SMARTSIG:Sparingly Both Nares Twice Daily    ondansetron (ZOFRAN) 8 MG tablet Take 1 tablet (8 mg total) by mouth every 8 (eight) hours as needed for Nausea. (Patient not taking: Reported on 2023)    pantoprazole (PROTONIX) 40 MG tablet Take 40 mg by mouth once daily.    potassium chloride (KLOR-CON) 10 MEQ TbSR Take 10 mEq by mouth once daily.    SYNTHROID 125 mcg tablet Take 125 mcg by mouth once daily.    traZODone (DESYREL) 100 MG tablet Take 1 tablet (100 mg total) by mouth every evening.    TRINTELLIX 10 mg Tab TAKE 1 TABLET BY MOUTH ONCE DAILY     Family History       Problem Relation (Age of Onset)    Lung cancer Father    Lupus Daughter  "   No Known Problems Sister, Brother, Son, Maternal Grandmother, Maternal Grandfather, Paternal Grandmother, Paternal Grandfather    Osteoporosis Mother          Tobacco Use    Smoking status: Every Day     Packs/day: 0.50     Types: Vaping with nicotine, Cigarettes     Start date:      Last attempt to quit:      Years since quittin.1    Smokeless tobacco: Never    Tobacco comments:     Former cigarette smoker, smoked "off and on" from 7485-1533. Quit in  then picked up vaping in .    Substance and Sexual Activity    Alcohol use: Yes     Comment: lucio in a can-1 per night    Drug use: Never    Sexual activity: Yes     Partners: Male     Birth control/protection: See Surgical Hx     Review of Systems   Constitutional:  Positive for fatigue. Negative for appetite change, chills, fever and unexpected weight change.   HENT:  Negative for congestion, mouth sores, nosebleeds, sinus pain, sore throat and trouble swallowing.    Eyes:  Negative for visual disturbance.   Respiratory:  Positive for shortness of breath. Negative for apnea, cough and chest tightness.    Cardiovascular:  Negative for chest pain, palpitations and leg swelling.   Gastrointestinal:  Negative for abdominal pain, blood in stool, constipation, diarrhea, nausea and vomiting.   Endocrine: Negative for polyuria.   Genitourinary:  Negative for decreased urine volume, difficulty urinating, dysuria and frequency.   Musculoskeletal:  Negative for arthralgias, back pain and neck pain.   Skin:  Negative for rash.   Neurological:  Positive for weakness and light-headedness. Negative for syncope and headaches.   Hematological:  Does not bruise/bleed easily.   Psychiatric/Behavioral:  Negative for confusion and suicidal ideas.    Objective:     Vital Signs (Most Recent):  Temp: 98.2 °F (36.8 °C) (23)  Pulse: 87 (23)  Resp: 18 (23)  BP: (!) 111/49 (23)  SpO2: 96 % (23)   Vital " Signs (24h Range):  Temp:  [97 °F (36.1 °C)-98.8 °F (37.1 °C)] 98.2 °F (36.8 °C)  Pulse:  [70-90] 87  Resp:  [11-20] 18  SpO2:  [93 %-100 %] 96 %  BP: (110-168)/(34-63) 111/49     Weight: 88.5 kg (195 lb)  Body mass index is 33.47 kg/m².    Physical Exam  Vitals and nursing note reviewed. Exam conducted with a chaperone present.   Constitutional:       Appearance: Normal appearance.   HENT:      Head: Atraumatic.      Mouth/Throat:      Mouth: Mucous membranes are moist.      Pharynx: Oropharynx is clear.   Eyes:      Conjunctiva/sclera: Conjunctivae normal.      Pupils: Pupils are equal, round, and reactive to light.   Neck:      Vascular: No carotid bruit.   Cardiovascular:      Rate and Rhythm: Normal rate and regular rhythm.      Pulses: Normal pulses.      Heart sounds: Normal heart sounds.   Pulmonary:      Effort: Pulmonary effort is normal.      Breath sounds: Normal breath sounds.   Abdominal:      General: Abdomen is flat. Bowel sounds are normal. There is no distension.      Palpations: Abdomen is soft. There is no mass.      Tenderness: There is no abdominal tenderness. There is no guarding or rebound.   Musculoskeletal:         General: No deformity or signs of injury. Normal range of motion.      Cervical back: Neck supple.      Right lower leg: No edema.      Left lower leg: No edema.   Skin:     General: Skin is warm and dry.      Capillary Refill: Capillary refill takes 2 to 3 seconds.      Coloration: Skin is pale. Skin is not jaundiced.      Findings: No bruising, lesion or rash.   Neurological:      General: No focal deficit present.      Mental Status: She is alert and oriented to person, place, and time.   Psychiatric:         Mood and Affect: Mood normal.         CRANIAL NERVES     CN III, IV, VI   Pupils are equal, round, and reactive to light.     Significant Labs: All pertinent labs within the past 24 hours have been reviewed.    Significant Imaging: I have reviewed all pertinent imaging  results/findings within the past 24 hours.    Assessment/Plan:     * UGIB (upper gastrointestinal bleed)  Anemia panel pending.  Heme positive  Transfusing 2 UpRBC to keep hgb above 8 in case of procedure  MIKHAIL for DVT prophylaxis. No anticoagulation due to bleed.    Appreciate GI assistance and eval for endoscopy.    Continue IV PPI.          Left bundle branch block  Patient told she has CHF and abnormal EKG.    Will check echo in AM.  EKG LBBB and trops normal.    Will need follow up with Dr. Casiano.  She was to see Dr. Carreon but she wishes to keep her records and doctors at [facility].    Patient given information on scheduling follow up appointment with Dr. Casiano.        Iron deficiency anemia  Most likely from chronic blood loss.    Anemia panel pending.    Transfusing.    Most likely the source of her worsening SOB but has appointment to be evaluated for CHF as OP and will order echo and have her follow with Dr. Casiano.  Will check a proBNP and fasting lipids for completeness.        ETOH abuse  Will give banana bag and monitor closely.  Patient reported has a few drinks daily.    INR is normal but esophageal varices in the DDx.        Insomnia  Home trazodone at 100 mg daily      Hypothyroidism  TSH normal on current dose of synthroid.        Hypertension  Will hold bystolic at present due to symptomatic anemia.        Anxiety and depression  Patient has recurrent depression which is unknown and is currently controlled. Will Continue anti-depressant medications. We will not consult psychiatry at this time. Patient does not display psychosis at this time. Continue to monitor closely and adjust plan of care as needed.    On trintellix and not on our formulary.  Can take home meds or restart at discharge.          Idiopathic thrombocytopenic purpura  Stable and will monitor.  Platelets at 81 K.    Coags are normal.          VTE Risk Mitigation (From admission, onward)    None             Haydee Farris,  MD  Department of Hospital Medicine   [facility]

## 2023-02-13 ENCOUNTER — ANESTHESIA EVENT (OUTPATIENT)
Dept: GASTROENTEROLOGY | Facility: HOSPITAL | Age: 68
DRG: 812 | End: 2023-02-13
Payer: COMMERCIAL

## 2023-02-13 ENCOUNTER — ANESTHESIA (OUTPATIENT)
Dept: GASTROENTEROLOGY | Facility: HOSPITAL | Age: 68
DRG: 812 | End: 2023-02-13
Payer: COMMERCIAL

## 2023-02-13 PROBLEM — R29.818 SUSPECTED SLEEP APNEA: Status: ACTIVE | Noted: 2023-02-13

## 2023-02-13 LAB
ANION GAP SERPL CALCULATED.3IONS-SCNC: 12 MMOL/L (ref 7–16)
ANISOCYTOSIS BLD QL SMEAR: ABNORMAL
BASOPHILS # BLD AUTO: 0.05 K/UL (ref 0–0.2)
BASOPHILS NFR BLD AUTO: 1 % (ref 0–1)
BUN SERPL-MCNC: 9 MG/DL (ref 7–18)
BUN/CREAT SERPL: 9 (ref 6–20)
CALCIUM SERPL-MCNC: 8.3 MG/DL (ref 8.5–10.1)
CHLORIDE SERPL-SCNC: 105 MMOL/L (ref 98–107)
CO2 SERPL-SCNC: 28 MMOL/L (ref 21–32)
CREAT SERPL-MCNC: 0.97 MG/DL (ref 0.55–1.02)
DIFFERENTIAL METHOD BLD: ABNORMAL
EGFR (NO RACE VARIABLE) (RUSH/TITUS): 64 ML/MIN/1.73M²
EOSINOPHIL # BLD AUTO: 0.25 K/UL (ref 0–0.5)
EOSINOPHIL NFR BLD AUTO: 4.9 % (ref 1–4)
ERYTHROCYTE [DISTWIDTH] IN BLOOD BY AUTOMATED COUNT: 22.9 % (ref 11.5–14.5)
GLUCOSE SERPL-MCNC: 84 MG/DL (ref 74–106)
HCT VFR BLD AUTO: 25.2 % (ref 38–47)
HGB BLD-MCNC: 7.7 G/DL (ref 12–16)
HYPOCHROMIA BLD QL SMEAR: ABNORMAL
IMM GRANULOCYTES # BLD AUTO: 0.02 K/UL (ref 0–0.04)
IMM GRANULOCYTES NFR BLD: 0.4 % (ref 0–0.4)
IRON SATN MFR SERPL: 2 % (ref 14–50)
IRON SERPL-MCNC: 13 ΜG/DL (ref 50–170)
LYMPHOCYTES # BLD AUTO: 1.35 K/UL (ref 1–4.8)
LYMPHOCYTES NFR BLD AUTO: 26.7 % (ref 27–41)
MCH RBC QN AUTO: 24.1 PG (ref 27–31)
MCHC RBC AUTO-ENTMCNC: 30.6 G/DL (ref 32–36)
MCV RBC AUTO: 78.8 FL (ref 80–96)
MONOCYTES # BLD AUTO: 0.52 K/UL (ref 0–0.8)
MONOCYTES NFR BLD AUTO: 10.3 % (ref 2–6)
MPC BLD CALC-MCNC: ABNORMAL G/DL
NEUTROPHILS # BLD AUTO: 2.87 K/UL (ref 1.8–7.7)
NEUTROPHILS NFR BLD AUTO: 56.7 % (ref 53–65)
NRBC # BLD AUTO: 0 X10E3/UL
NRBC, AUTO (.00): 0 %
OVALOCYTES BLD QL SMEAR: ABNORMAL
PLATELET # BLD AUTO: 65 K/UL (ref 150–400)
PLATELET MORPHOLOGY: ABNORMAL
POLYCHROMASIA BLD QL SMEAR: ABNORMAL
POTASSIUM SERPL-SCNC: 3.6 MMOL/L (ref 3.5–5.1)
RBC # BLD AUTO: 3.2 M/UL (ref 4.2–5.4)
SODIUM SERPL-SCNC: 141 MMOL/L (ref 136–145)
STOMATOCYTES BLD QL SMEAR: ABNORMAL
TARGETS BLD QL SMEAR: ABNORMAL
TIBC SERPL-MCNC: 537 ΜG/DL (ref 250–450)
WBC # BLD AUTO: 5.06 K/UL (ref 4.5–11)

## 2023-02-13 PROCEDURE — 85025 COMPLETE CBC W/AUTO DIFF WBC: CPT | Performed by: FAMILY MEDICINE

## 2023-02-13 PROCEDURE — 99233 SBSQ HOSP IP/OBS HIGH 50: CPT | Mod: ,,, | Performed by: HOSPITALIST

## 2023-02-13 PROCEDURE — 99233 PR SUBSEQUENT HOSPITAL CARE,LEVL III: ICD-10-PCS | Mod: ,,, | Performed by: HOSPITALIST

## 2023-02-13 PROCEDURE — 43239 PR EGD, FLEX, W/BIOPSY, SGL/MULTI: ICD-10-PCS | Mod: 51,,, | Performed by: INTERNAL MEDICINE

## 2023-02-13 PROCEDURE — 43239 EGD BIOPSY SINGLE/MULTIPLE: CPT | Mod: 51,,, | Performed by: INTERNAL MEDICINE

## 2023-02-13 PROCEDURE — 25000003 PHARM REV CODE 250

## 2023-02-13 PROCEDURE — 63600175 PHARM REV CODE 636 W HCPCS

## 2023-02-13 PROCEDURE — 11000001 HC ACUTE MED/SURG PRIVATE ROOM

## 2023-02-13 PROCEDURE — 25000003 PHARM REV CODE 250: Performed by: HOSPITALIST

## 2023-02-13 PROCEDURE — 43239 EGD BIOPSY SINGLE/MULTIPLE: CPT | Performed by: INTERNAL MEDICINE

## 2023-02-13 PROCEDURE — D9220A PRA ANESTHESIA: Mod: ,,,

## 2023-02-13 PROCEDURE — D9220A PRA ANESTHESIA: ICD-10-PCS | Mod: ,,,

## 2023-02-13 PROCEDURE — 27000284 HC CANNULA NASAL

## 2023-02-13 PROCEDURE — 80048 BASIC METABOLIC PNL TOTAL CA: CPT | Performed by: FAMILY MEDICINE

## 2023-02-13 PROCEDURE — 45378 PR COLONOSCOPY,DIAGNOSTIC: ICD-10-PCS | Mod: ,,, | Performed by: INTERNAL MEDICINE

## 2023-02-13 PROCEDURE — 45378 DIAGNOSTIC COLONOSCOPY: CPT | Mod: ,,, | Performed by: INTERNAL MEDICINE

## 2023-02-13 PROCEDURE — 63600175 PHARM REV CODE 636 W HCPCS: Performed by: HOSPITALIST

## 2023-02-13 PROCEDURE — 27000716 HC OXISENSOR PROBE, ANY SIZE

## 2023-02-13 PROCEDURE — 45378 DIAGNOSTIC COLONOSCOPY: CPT | Performed by: INTERNAL MEDICINE

## 2023-02-13 RX ORDER — PANTOPRAZOLE SODIUM 40 MG/1
40 TABLET, DELAYED RELEASE ORAL
Status: DISCONTINUED | OUTPATIENT
Start: 2023-02-14 | End: 2023-02-14 | Stop reason: HOSPADM

## 2023-02-13 RX ORDER — PROPOFOL 10 MG/ML
VIAL (ML) INTRAVENOUS
Status: DISCONTINUED | OUTPATIENT
Start: 2023-02-13 | End: 2023-02-13

## 2023-02-13 RX ORDER — LIDOCAINE HYDROCHLORIDE 20 MG/ML
INJECTION INTRAVENOUS
Status: DISCONTINUED | OUTPATIENT
Start: 2023-02-13 | End: 2023-02-13

## 2023-02-13 RX ADMIN — TRAZODONE HYDROCHLORIDE 100 MG: 50 TABLET ORAL at 09:02

## 2023-02-13 RX ADMIN — PROPOFOL 50 MG: 10 INJECTION, EMULSION INTRAVENOUS at 03:02

## 2023-02-13 RX ADMIN — ACETAMINOPHEN 1000 MG: 500 TABLET ORAL at 08:02

## 2023-02-13 RX ADMIN — PROPOFOL 100 MG: 10 INJECTION, EMULSION INTRAVENOUS at 03:02

## 2023-02-13 RX ADMIN — PROPOFOL 40 MG: 10 INJECTION, EMULSION INTRAVENOUS at 03:02

## 2023-02-13 RX ADMIN — FOLIC ACID: 5 INJECTION, SOLUTION INTRAMUSCULAR; INTRAVENOUS; SUBCUTANEOUS at 09:02

## 2023-02-13 RX ADMIN — LIDOCAINE HYDROCHLORIDE 100 MG: 20 INJECTION, SOLUTION INTRAVENOUS at 03:02

## 2023-02-13 RX ADMIN — SODIUM CHLORIDE: 9 INJECTION, SOLUTION INTRAVENOUS at 03:02

## 2023-02-13 NOTE — SUBJECTIVE & OBJECTIVE
Review of Systems   Constitutional:  Positive for fatigue. Negative for appetite change, chills, fever and unexpected weight change.   HENT:  Negative for congestion, mouth sores, nosebleeds, sinus pain, sore throat and trouble swallowing.    Eyes:  Negative for visual disturbance.   Respiratory:  Negative for apnea, cough, chest tightness and shortness of breath.    Cardiovascular:  Negative for chest pain, palpitations and leg swelling.   Gastrointestinal:  Negative for abdominal pain, blood in stool, constipation, diarrhea, nausea and vomiting.   Endocrine: Negative for polyuria.   Genitourinary:  Negative for decreased urine volume, difficulty urinating, dysuria and frequency.   Musculoskeletal:  Negative for arthralgias, back pain and neck pain.   Skin:  Negative for rash.   Neurological:  Positive for weakness. Negative for syncope, light-headedness and headaches.   Hematological:  Does not bruise/bleed easily.   Psychiatric/Behavioral:  Negative for confusion and suicidal ideas.    Objective:     Vital Signs (Most Recent):  Temp: 98.4 °F (36.9 °C) (02/12/23 1901)  Pulse: 88 (02/12/23 1901)  Resp: 18 (02/12/23 1901)  BP: (!) 176/70 (notified nurse) (02/12/23 1901)  SpO2: 95 % (02/12/23 1901)   Vital Signs (24h Range):  Temp:  [97.2 °F (36.2 °C)-98.8 °F (37.1 °C)] 98.4 °F (36.9 °C)  Pulse:  [65-88] 88  Resp:  [16-18] 18  SpO2:  [91 %-100 %] 95 %  BP: (110-176)/(40-70) 176/70     Weight: 88.5 kg (195 lb)  Body mass index is 33.47 kg/m².    Intake/Output Summary (Last 24 hours) at 2/12/2023 2241  Last data filed at 2/12/2023 1810  Gross per 24 hour   Intake 907 ml   Output --   Net 907 ml      Physical Exam  Vitals and nursing note reviewed. Exam conducted with a chaperone present.   Constitutional:       Appearance: Normal appearance.   HENT:      Head: Atraumatic.      Mouth/Throat:      Mouth: Mucous membranes are moist.      Pharynx: Oropharynx is clear.   Eyes:      Conjunctiva/sclera: Conjunctivae  normal.      Pupils: Pupils are equal, round, and reactive to light.   Neck:      Vascular: No carotid bruit.   Cardiovascular:      Rate and Rhythm: Normal rate and regular rhythm.      Pulses: Normal pulses.      Heart sounds: Normal heart sounds.   Pulmonary:      Effort: Pulmonary effort is normal.      Breath sounds: Normal breath sounds.   Abdominal:      General: Abdomen is flat. Bowel sounds are normal. There is no distension.      Palpations: Abdomen is soft. There is no mass.      Tenderness: There is no abdominal tenderness. There is no guarding or rebound.   Musculoskeletal:         General: No deformity or signs of injury. Normal range of motion.      Cervical back: Neck supple.      Right lower leg: No edema.      Left lower leg: No edema.   Skin:     General: Skin is warm and dry.      Capillary Refill: Capillary refill takes 2 to 3 seconds.      Coloration: Skin is pale. Skin is not jaundiced.      Findings: No bruising, lesion or rash.   Neurological:      General: No focal deficit present.      Mental Status: She is alert and oriented to person, place, and time.   Psychiatric:         Mood and Affect: Mood normal.       Significant Labs: All pertinent labs within the past 24 hours have been reviewed.    Significant Imaging: I have reviewed all pertinent imaging results/findings within the past 24 hours.

## 2023-02-13 NOTE — HOSPITAL COURSE
02/12: No complaints this morning. Hgb 7.7 after 2 units PRBC. GI evaluation pending.     02/13 Records reviewed. No clear bleeding site seen on EGD or coloscopy. Does have significant iron def anemia. Watch on iron therapy.     02/14: H&H stable, GI noted EGD/Colonoscopy with no bleeding to either. If H&H drops again could consider pill cam endoscopy. Will need recheck iron panel in 2-3 months with PCP. Advised on oral iron supplement. Patient has scheduled followed up with Dr. Hubbard on 2/22/23 already for ITP history. Concern for sleep apnea, referral sent for sleep study. Patient to d/c home today.     Patient counseled on the importance of keeping all hospital follow up appointments and compliance with medications, therapies, or devices as prescribed in order to provide for the best health outcomes and decrease the risk of hospital readmission. Additionally, patient given written literature regarding their current disease processes and home care recommendations. Patient given opportunity to ask questions and verbalized understanding of all information discussed.

## 2023-02-13 NOTE — ANESTHESIA PREPROCEDURE EVALUATION
02/13/2023  Ene Martin is a 67 y.o., female.  No current facility-administered medications on file prior to encounter.     Current Outpatient Medications on File Prior to Encounter   Medication Sig Dispense Refill    BYSTOLIC 5 mg Tab Take 5 mg by mouth once daily.      calcium carbonate-vitamin D3 500 mg-3.125 mcg (125 unit) per tablet Take 1 tablet by mouth once daily.      cetirizine-pseudoephedrine 5-120 mg Tb12 Take 1 tablet by mouth once daily.      cholecalciferol, vitamin D3, 125 mcg (5,000 unit) Tab Take 5,000 Units by mouth once daily.      DUREZOL 0.05 % Drop ophthalmic solution Place 1 drop into the right eye 2 (two) times daily as needed.      ferrous sulfate (FEOSOL) 325 mg (65 mg iron) Tab tablet Take 325 mg by mouth 2 (two) times daily.      furosemide (LASIX) 20 MG tablet Take 40 mg by mouth once daily.      meclizine (ANTIVERT) 25 mg tablet Take 1 tablet (25 mg total) by mouth 3 (three) times daily as needed for Dizziness. (Patient not taking: Reported on 1/18/2023) 30 tablet 1    multivit-min/iron/folic/lutein (CENTRUM SILVER WOMEN ORAL) Take 1 tablet by mouth once daily.      mupirocin (BACTROBAN) 2 % ointment SMARTSIG:Sparingly Both Nares Twice Daily      ondansetron (ZOFRAN) 8 MG tablet Take 1 tablet (8 mg total) by mouth every 8 (eight) hours as needed for Nausea. (Patient not taking: Reported on 1/18/2023) 20 tablet 1    pantoprazole (PROTONIX) 40 MG tablet Take 40 mg by mouth once daily.      potassium chloride (KLOR-CON) 10 MEQ TbSR Take 10 mEq by mouth once daily.      SYNTHROID 125 mcg tablet Take 125 mcg by mouth once daily.      traZODone (DESYREL) 100 MG tablet Take 1 tablet (100 mg total) by mouth every evening. 30 tablet 11    TRINTELLIX 10 mg Tab TAKE 1 TABLET BY MOUTH ONCE DAILY 30 tablet 1     Active Ambulatory Problems     Diagnosis Date Noted     Idiopathic thrombocytopenic purpura 2022    Anxiety and depression     Hypertension     Hypothyroidism 2022    Insomnia 2022    ETOH abuse 10/24/2022    Iron deficiency anemia 2023     Resolved Ambulatory Problems     Diagnosis Date Noted    COVID-19 2021     Past Medical History:   Diagnosis Date    GERD (gastroesophageal reflux disease)     Hyperlipidemia     Left bundle branch block     Osteoporosis     Pernicious anemia      Past Surgical History:   Procedure Laterality Date    CATARACT EXTRACTION Bilateral      SECTION      HYSTERECTOMY      THYROIDECTOMY           Pre-op Assessment    I have reviewed the Patient Summary Reports.     I have reviewed the Nursing Notes. I have reviewed the NPO Status.   I have reviewed the Medications.     Review of Systems  Anesthesia Hx:  No problems with previous Anesthesia  Denies Family Hx of Anesthesia complications.   Denies Personal Hx of Anesthesia complications.   Social:  Smoker, Alcohol Use vapes    Hematology/Oncology:     Oncology Normal    -- Anemia:   EENT/Dental:EENT/Dental Normal   Cardiovascular:   Hypertension Dysrhythmias    Pulmonary:   Shortness of breath (BELLO) Sleep Apnea    Renal/:  Renal/ Normal     Hepatic/GI:   Bowel Prep. GERD Liver Disease,    Musculoskeletal:  Musculoskeletal Normal    Neurological:  Neurology Normal    Endocrine:   Hypothyroidism    Dermatological:  Skin Normal    Psych:   Psychiatric History anxiety depression          Chemistry        Component Value Date/Time     2023 0532    K 3.6 2023 0532     2023 0532    CO2 28 2023 0532    BUN 9 2023 0532    CREATININE 0.97 2023 0532    GLU 84 2023 0532        Component Value Date/Time    CALCIUM 8.3 (L) 2023 0532    ALKPHOS 80 2023 1500    AST 61 (H) 2023 1500    ALT 77 (H) 2023 1500    BILITOT 0.6 2023 1500    EGFRNONAA 62 2022  1600        Lab Results   Component Value Date    WBC 5.06 02/13/2023    HGB 7.7 (L) 02/13/2023    HCT 25.2 (L) 02/13/2023    MCV 78.8 (L) 02/13/2023    PLT 65 (L) 02/13/2023       Results for orders placed or performed during the hospital encounter of 02/11/23   EKG 12-lead    Collection Time: 02/12/23  1:43 AM    Narrative    Test Reason : R06.02,    Vent. Rate : 076 BPM     Atrial Rate : 076 BPM     P-R Int : 174 ms          QRS Dur : 150 ms      QT Int : 460 ms       P-R-T Axes : 077 135 087 degrees     QTc Int : 517 ms    Normal sinus rhythm  Right axis deviation  Nonspecific intraventricular block  Abnormal ECG  No previous ECGs available  Confirmed by Brenton CARDENAS, Haydee GALLEGO ([id number]) on 2/12/2023 5:51:36 PM    Referred By: [email or web address]   SELF           Confirmed By:Haydee Farris MD         Physical Exam  General: Well nourished, Cooperative, Alert and Oriented    Airway:  Mallampati: II   Mouth Opening: Small, but > 3cm  TM Distance: Normal  Tongue: Normal  Neck ROM: Normal ROM    Dental:  Intact    Chest/Lungs:  Normal Respiratory Rate        Anesthesia Plan  Type of Anesthesia, risks & benefits discussed:    Anesthesia Type: Gen Natural Airway  Intra-op Monitoring Plan: Standard ASA Monitors  Post Op Pain Control Plan: multimodal analgesia  Induction:  IV  Informed Consent: Informed consent signed with the Patient and all parties understand the risks and agree with anesthesia plan.  All questions answered. Patient consented to blood products? Yes  ASA Score: 3  Day of Surgery Review of History & Physical: H&P Update referred to the surgeon/provider.I have interviewed and examined the patient. I have reviewed the patient's H&P dated: There are no significant changes.     Ready For Surgery From Anesthesia Perspective.     .

## 2023-02-13 NOTE — PROGRESS NOTES
[facility]  [facility] Medicine  Progress Note    Patient Name: Ene Martin  MRN: [id number]  Patient Class: IP- Inpatient   Admission Date: 2/11/2023  Length of Stay: 1 days  Attending Physician: Tabitha Zurita DO  Primary Care Provider: Dagoberto Ravi DO        Subjective:     Principal Problem:UGIB (upper gastrointestinal bleed)        HPI:  66 yo F, who appears younger than stated age, presents to the ED with c/o generalized weakness and worsening SOB.  She states that it began with exertion ut now has progressed to even at rest.  She was told she had CHF and has an appointment with Dr. Carreon to get an echo week after next but she was just too weak and dyspneic to wait until next week.      In the ED she was found to have a microcytic anemia with hgb 5.  She states that she is not on any blood thinners but she has noted dark stools but takes iron for previous ARIANA and B12 for previous pernicious anemia.  She has had no noted bleeding or N/V/D or constipation.  She has no abdominal pain or dysphagia or unexplained weight loss.  She does take a PPI at home.  She also states that she was diagnosed with ITP several years ago by Dr. Tristen Alvarez when her platelets were 40 K (currently 80 K) but she has not had a problem in years and noted no easy bruising or rash like lesions.        Overview/Hospital Course:  02/12: No complaints this morning. Hgb 7.7 after 2 units PRBC. GI evaluation pending.           Review of Systems   Constitutional:  Positive for fatigue. Negative for appetite change, chills, fever and unexpected weight change.   HENT:  Negative for congestion, mouth sores, nosebleeds, sinus pain, sore throat and trouble swallowing.    Eyes:  Negative for visual disturbance.   Respiratory:  Negative for apnea, cough, chest tightness and shortness of breath.    Cardiovascular:  Negative for chest pain, palpitations and leg swelling.   Gastrointestinal:  Negative for abdominal  pain, blood in stool, constipation, diarrhea, nausea and vomiting.   Endocrine: Negative for polyuria.   Genitourinary:  Negative for decreased urine volume, difficulty urinating, dysuria and frequency.   Musculoskeletal:  Negative for arthralgias, back pain and neck pain.   Skin:  Negative for rash.   Neurological:  Positive for weakness. Negative for syncope, light-headedness and headaches.   Hematological:  Does not bruise/bleed easily.   Psychiatric/Behavioral:  Negative for confusion and suicidal ideas.    Objective:     Vital Signs (Most Recent):  Temp: 98.4 °F (36.9 °C) (02/12/23 1901)  Pulse: 88 (02/12/23 1901)  Resp: 18 (02/12/23 1901)  BP: (!) 176/70 (notified nurse) (02/12/23 1901)  SpO2: 95 % (02/12/23 1901)   Vital Signs (24h Range):  Temp:  [97.2 °F (36.2 °C)-98.8 °F (37.1 °C)] 98.4 °F (36.9 °C)  Pulse:  [65-88] 88  Resp:  [16-18] 18  SpO2:  [91 %-100 %] 95 %  BP: (110-176)/(40-70) 176/70     Weight: 88.5 kg (195 lb)  Body mass index is 33.47 kg/m².    Intake/Output Summary (Last 24 hours) at 2/12/2023 2241  Last data filed at 2/12/2023 1810  Gross per 24 hour   Intake 907 ml   Output --   Net 907 ml      Physical Exam  Vitals and nursing note reviewed. Exam conducted with a chaperone present.   Constitutional:       Appearance: Normal appearance.   HENT:      Head: Atraumatic.      Mouth/Throat:      Mouth: Mucous membranes are moist.      Pharynx: Oropharynx is clear.   Eyes:      Conjunctiva/sclera: Conjunctivae normal.      Pupils: Pupils are equal, round, and reactive to light.   Neck:      Vascular: No carotid bruit.   Cardiovascular:      Rate and Rhythm: Normal rate and regular rhythm.      Pulses: Normal pulses.      Heart sounds: Normal heart sounds.   Pulmonary:      Effort: Pulmonary effort is normal.      Breath sounds: Normal breath sounds.   Abdominal:      General: Abdomen is flat. Bowel sounds are normal. There is no distension.      Palpations: Abdomen is soft. There is no mass.       Tenderness: There is no abdominal tenderness. There is no guarding or rebound.   Musculoskeletal:         General: No deformity or signs of injury. Normal range of motion.      Cervical back: Neck supple.      Right lower leg: No edema.      Left lower leg: No edema.   Skin:     General: Skin is warm and dry.      Capillary Refill: Capillary refill takes 2 to 3 seconds.      Coloration: Skin is pale. Skin is not jaundiced.      Findings: No bruising, lesion or rash.   Neurological:      General: No focal deficit present.      Mental Status: She is alert and oriented to person, place, and time.   Psychiatric:         Mood and Affect: Mood normal.       Significant Labs: All pertinent labs within the past 24 hours have been reviewed.    Significant Imaging: I have reviewed all pertinent imaging results/findings within the past 24 hours.      Assessment/Plan:      * UGIB (upper gastrointestinal bleed)  Anemia panel pending.  Heme positive  Transfusing 2 UpRBC to keep hgb above 8 in case of procedure  MIKHAIL for DVT prophylaxis. No anticoagulation due to bleed.    Appreciate GI assistance and eval for endoscopy.    Continue IV PPI.          Left bundle branch block  Patient told she has CHF and abnormal EKG.    Will check echo in AM.  EKG LBBB and trops normal.    Will need follow up with Dr. Casiano.  She was to see Dr. Carreon but she wishes to keep her records and doctors at [facility].    Patient given information on scheduling follow up appointment with Dr. Casiano.        Iron deficiency anemia  Most likely from chronic blood loss.    Anemia panel pending.    Transfusing.    Most likely the source of her worsening SOB but has appointment to be evaluated for CHF as OP and will order echo and have her follow with Dr. Casiano.  Will check a proBNP and fasting lipids for completeness.        ETOH abuse  Will give banana bag and monitor closely.  Patient reported has a few drinks daily.    INR is normal but esophageal varices  in the DDx.        Insomnia  Home trazodone at 100 mg daily      Hypothyroidism  TSH normal on current dose of synthroid.        Hypertension  Will hold bystolic at present due to symptomatic anemia.        Anxiety and depression  Patient has recurrent depression which is unknown and is currently controlled. Will Continue anti-depressant medications. We will not consult psychiatry at this time. Patient does not display psychosis at this time. Continue to monitor closely and adjust plan of care as needed.    On trintellix and not on our formulary.  Can take home meds or restart at discharge.          Idiopathic thrombocytopenic purpura  Stable and will monitor.  Platelets at 81 K.    Coags are normal.          VTE Risk Mitigation (From admission, onward)    None          Discharge Planning   TO:      Code Status: Full Code   Is the patient medically ready for discharge?:     Reason for patient still in hospital (select all that apply): Treatment                     Tabitha Zurita,   Department of Hospital Medicine   [facility]

## 2023-02-13 NOTE — PLAN OF CARE
Problem: Adult Inpatient Plan of Care  Goal: Plan of Care Review  Outcome: Ongoing, Progressing  Goal: Patient-Specific Goal (Individualized)  Outcome: Ongoing, Progressing  Flowsheets (Taken 2/13/2023 2645)  Anxieties, Fears or Concerns: none  Individualized Care Needs: none, nutrition  Goal: Absence of Hospital-Acquired Illness or Injury  Outcome: Ongoing, Progressing  Goal: Optimal Comfort and Wellbeing  Outcome: Ongoing, Progressing  Goal: Readiness for Transition of Care  Outcome: Ongoing, Progressing

## 2023-02-13 NOTE — DISCHARGE INSTRUCTIONS
Procedure Date  2/13/23     Impression  Overall Impression:   - 2-cm hiatal hernia  - Minimal gastritis  - The exam was otherwise normal  - No varices, esophagitis, mass, peptic ulcer, AVM, blood/bleeding     Recommendation    No source of bleeding found on EGD  Proceed with colonoscopy      COLONOSCOPY    Procedure Date  2/13/23     Impression  Overall Impression:   - Erythema in the cecum favored to be related to barotrauma from endoscopy  - No blood/bleeding, mass, AVM, or ulcerated mucosa  - Grade I Internal Hemorrhoids  - The prep was inadequate for screening     Recommendation    No obvious source on EGD/Colonoscopy for anemia  FOBT result likely related to hemorrhoids  Agree with anemia workup and replacement as needed  Recommend non-urgent colonoscopy for screening purposes in the future as outpatient  If she has further drops in H&H, can consider clinically directed repeat endoscopy or Tagged RBC/CT GIB protocol to help localize bleeding source

## 2023-02-13 NOTE — PLAN OF CARE
Problem: Adult Inpatient Plan of Care  Goal: Plan of Care Review  Outcome: Ongoing, Progressing  Goal: Optimal Comfort and Wellbeing  Outcome: Ongoing, Progressing     Problem: Anemia  Goal: Anemia Symptom Improvement  Outcome: Ongoing, Progressing

## 2023-02-13 NOTE — ANESTHESIA POSTPROCEDURE EVALUATION
Anesthesia Post Evaluation    Patient: Ene Martin    Procedure(s) Performed: *EGD and Colonoscopy     Final Anesthesia Type: general      Patient location during evaluation: GI PACU  Patient participation: Yes- Able to Participate  Level of consciousness: awake and alert  Post-procedure vital signs: reviewed and stable  Pain management: adequate  Airway patency: patent    PONV status at discharge: No PONV  Anesthetic complications: no      Cardiovascular status: blood pressure returned to baseline and hemodynamically stable  Respiratory status: spontaneous ventilation  Hydration status: euvolemic  Follow-up not needed.  Comments: Pt voices appreciation for care          Vitals Value Taken Time   /66 02/13/23 1552   Temp 36.1 °C (97 °F) 02/13/23 1537   Pulse 79 02/13/23 1552   Resp 21 02/13/23 1552   SpO2 96 % 02/13/23 1552   Vitals shown include unvalidated device data.      No case tracking events are documented in the log.      Pain/Joslyn Score: Joslyn Score: 8 (2/13/2023  3:41 PM)

## 2023-02-13 NOTE — CONSULTS
GI Brief Plan of Care Note     Impression  Overall Impression:   - Erythema in the cecum favored to be related to barotrauma from endoscopy  - No blood/bleeding, mass, AVM, or ulcerated mucosa  - Grade I Internal Hemorrhoids  - The prep was inadequate for screening     Recommendation    No obvious source on EGD/Colonoscopy for anemia  FOBT result likely related to hemorrhoids  Repeat colonoscopy in 1 year for screening purposes in the future as outpatient  If she has further drops in H&H, can consider clinically directed repeat endoscopy or Tagged RBC/CT GIB protocol to help localize bleeding source        Plan     - replace iron IV while inpatient, discharge with oral replacement  - recheck iron panel in 2-3 months with PCP, if she doesn't respond to replacement, would consider going ahead with repeat colonoscopy (given inadequate prep today) or pill cam endoscopy at that point  - can stop protonix  - please call me if I can be of assistance prior to discharge  - see Dr. Castelan's recent note for consult note    Talat Tran MD  Gastroenterology

## 2023-02-13 NOTE — TRANSFER OF CARE
"Anesthesia Transfer of Care Note    Patient: Ene Martin    Procedure(s) Performed: EGD and Colonoscopy     Patient location: GI    Anesthesia Type: general    Transport from OR: Transported from OR on room air with adequate spontaneous ventilation. Continuous ECG monitoring in transport. Continuous SpO2 monitoring in transport    Post pain: adequate analgesia    Post assessment: no apparent anesthetic complications    Post vital signs: stable    Level of consciousness: sedated and responds to stimulation    Nausea/Vomiting: no nausea/vomiting    Complications: none    Transfer of care protocol was followedComments: Good SV continue, NAD, VSS, RTRN      Last vitals:   Visit Vitals  /62 (BP Location: Left arm, Patient Position: Lying)   Pulse 80   Temp 36.1 °C (97 °F) (Skin)   Resp 20   Ht 5' 4" (1.626 m)   Wt 88.5 kg (195 lb)   SpO2 100%   Breastfeeding No   BMI 33.47 kg/m²     "

## 2023-02-13 NOTE — NURSING
2/13/23 2030  Patient refused to take scheduled trazodone, states she has started to have frequent bowel movements and does not want to be drowsy trying to go back and forth to the restroom.   2300 patient refused for new bag of protonix IV to be initiated. States she is having to go to the bathroom too frequently and she does not want to be connected to the IV pole. Education provided on assistive equipment/person to get patient to the restroom and still receive ordered medications. Patient continued to refuse.   Will continue to monitor and report.

## 2023-02-14 VITALS
OXYGEN SATURATION: 88 % | SYSTOLIC BLOOD PRESSURE: 159 MMHG | RESPIRATION RATE: 18 BRPM | HEIGHT: 64 IN | DIASTOLIC BLOOD PRESSURE: 60 MMHG | BODY MASS INDEX: 33.29 KG/M2 | HEART RATE: 97 BPM | TEMPERATURE: 98 F | WEIGHT: 195 LBS

## 2023-02-14 LAB
25(OH)D3 SERPL-MCNC: 59.3 NG/ML
ANISOCYTOSIS BLD QL SMEAR: ABNORMAL
BASOPHILS # BLD AUTO: 0.07 K/UL (ref 0–0.2)
BASOPHILS NFR BLD AUTO: 1.2 % (ref 0–1)
DIFFERENTIAL METHOD BLD: ABNORMAL
EOSINOPHIL # BLD AUTO: 0.32 K/UL (ref 0–0.5)
EOSINOPHIL NFR BLD AUTO: 5.3 % (ref 1–4)
ERYTHROCYTE [DISTWIDTH] IN BLOOD BY AUTOMATED COUNT: 23.2 % (ref 11.5–14.5)
HCT VFR BLD AUTO: 27.9 % (ref 38–47)
HGB BLD-MCNC: 8.1 G/DL (ref 12–16)
HYPOCHROMIA BLD QL SMEAR: ABNORMAL
IMM GRANULOCYTES # BLD AUTO: 0.02 K/UL (ref 0–0.04)
IMM GRANULOCYTES NFR BLD: 0.3 % (ref 0–0.4)
LYMPHOCYTES # BLD AUTO: 1.65 K/UL (ref 1–4.8)
LYMPHOCYTES NFR BLD AUTO: 27.2 % (ref 27–41)
MCH RBC QN AUTO: 23.8 PG (ref 27–31)
MCHC RBC AUTO-ENTMCNC: 29 G/DL (ref 32–36)
MCV RBC AUTO: 82.1 FL (ref 80–96)
MONOCYTES # BLD AUTO: 0.66 K/UL (ref 0–0.8)
MONOCYTES NFR BLD AUTO: 10.9 % (ref 2–6)
MPC BLD CALC-MCNC: ABNORMAL G/DL
NEUTROPHILS # BLD AUTO: 3.34 K/UL (ref 1.8–7.7)
NEUTROPHILS NFR BLD AUTO: 55.1 % (ref 53–65)
NRBC # BLD AUTO: 0 X10E3/UL
NRBC, AUTO (.00): 0 %
OVALOCYTES BLD QL SMEAR: ABNORMAL
PLATELET # BLD AUTO: 83 K/UL (ref 150–400)
PLATELET MORPHOLOGY: ABNORMAL
POLYCHROMASIA BLD QL SMEAR: ABNORMAL
RBC # BLD AUTO: 3.4 M/UL (ref 4.2–5.4)
STOMATOCYTES BLD QL SMEAR: ABNORMAL
WBC # BLD AUTO: 6.06 K/UL (ref 4.5–11)

## 2023-02-14 PROCEDURE — 25000003 PHARM REV CODE 250: Performed by: HOSPITALIST

## 2023-02-14 PROCEDURE — 85025 COMPLETE CBC W/AUTO DIFF WBC: CPT | Performed by: FAMILY MEDICINE

## 2023-02-14 PROCEDURE — 99239 PR HOSPITAL DISCHARGE DAY,>30 MIN: ICD-10-PCS | Mod: ,,, | Performed by: HOSPITALIST

## 2023-02-14 PROCEDURE — 82306 VITAMIN D 25 HYDROXY: CPT | Performed by: HOSPITALIST

## 2023-02-14 PROCEDURE — 99239 HOSP IP/OBS DSCHRG MGMT >30: CPT | Mod: ,,, | Performed by: HOSPITALIST

## 2023-02-14 RX ORDER — PANTOPRAZOLE SODIUM 40 MG/1
40 TABLET, DELAYED RELEASE ORAL 2 TIMES DAILY
Qty: 28 TABLET | Refills: 0 | Status: SHIPPED | OUTPATIENT
Start: 2023-02-14 | End: 2023-06-15 | Stop reason: SDUPTHER

## 2023-02-14 RX ADMIN — LEVOTHYROXINE SODIUM 125 MCG: 125 TABLET ORAL at 10:02

## 2023-02-14 RX ADMIN — PANTOPRAZOLE SODIUM 40 MG: 40 TABLET, DELAYED RELEASE ORAL at 08:02

## 2023-02-14 NOTE — DISCHARGE SUMMARY
[facility] Hospital Medicine  Discharge Summary      Patient Name: Ene Martin  MRN: [id number]  ELIZABETH: [id number]  Patient Class: IP- Inpatient  Admission Date: 2/11/2023  Hospital Length of Stay: 3 days  Discharge Date and Time:  02/14/2023 2:24 PM  Attending Physician: Jewel Elam MD   Discharging Provider: ANA Domingo  Primary Care Provider: Dagoberto Ravi DO    Primary Care Team: Networked reference to record PCT     HPI:   68 yo F, who appears younger than stated age, presents to the ED with c/o generalized weakness and worsening SOB.  She states that it began with exertion ut now has progressed to even at rest.  She was told she had CHF and has an appointment with Dr. Carreon to get an echo week after next but she was just too weak and dyspneic to wait until next week.      In the ED she was found to have a microcytic anemia with hgb 5.  She states that she is not on any blood thinners but she has noted dark stools but takes iron for previous ARIANA and B12 for previous pernicious anemia.  She has had no noted bleeding or N/V/D or constipation.  She has no abdominal pain or dysphagia or unexplained weight loss.  She does take a PPI at home.  She also states that she was diagnosed with ITP several years ago by Dr. Tristen Alvarez when her platelets were 40 K (currently 80 K) but she has not had a problem in years and noted no easy bruising or rash like lesions.        * No surgery found *      Hospital Course:   02/12: No complaints this morning. Hgb 7.7 after 2 units PRBC. GI evaluation pending.     02/13 Records reviewed. No clear bleeding site seen on EGD or coloscopy. Does have significant iron def anemia. Watch on iron therapy.     02/14: H&H stable, GI noted EGD/Colonoscopy with no bleeding to either. If H&H drops again could consider pill cam endoscopy. Will need recheck iron panel in 2-3 months with PCP. Advised on oral iron supplement. Patient has scheduled followed  up with Dr. Hubbard on 2/22/23 already for ITP history. Concern for sleep apnea, referral sent for sleep study. Patient to d/c home today.     Patient counseled on the importance of keeping all hospital follow up appointments and compliance with medications, therapies, or devices as prescribed in order to provide for the best health outcomes and decrease the risk of hospital readmission. Additionally, patient given written literature regarding their current disease processes and home care recommendations. Patient given opportunity to ask questions and verbalized understanding of all information discussed.          Goals of Care Treatment Preferences:  Code Status: Full Code      Consults:   Consults (From admission, onward)        Status Ordering Provider     Inpatient consult to Gastroenterology  Once        Provider:  Henri Castelan MD    Completed CLEVELAND RODRIGUEZ     Inpatient consult to Gastroenterology  Once        Provider:  (Not yet assigned)    BRAYAN Pina          * UGIB (upper gastrointestinal bleed)  Anemia panel pending.  Heme positive  Transfusing 2 UpRBC to keep hgb above 8 in case of procedure  MIKHAIL for DVT prophylaxis. No anticoagulation due to bleed.    Appreciate GI assistance and eval for endoscopy.    Continue IV PPI.      EGD Impression  Overall Impression:   - 2-cm hiatal hernia  - Minimal gastritis  - The exam was otherwise normal  - No varices, esophagitis, mass, peptic ulcer, AVM, blood/bleeding     Recommendation    No source of bleeding found on EGD  Proceed with colonoscopy      Outcome of procedure: successful EGD  Disposition: proceed with colonoscopy  Provisions for follow up: please call my office for any unexpected symptoms like chest or abdominal pain or bleeding following your procedure.  Final Diagnosis: anemia          Suspected sleep apnea  Nocturia awakening and naps daytime  outpt sleep study she wants in [place]      Symptomatic anemia   Monitor h/h  even after dc    Rec'd 2 u PRBCs this admission      Left bundle branch block  Patient told she has CHF and abnormal EKG.    Will check echo in AM.  EKG LBBB and trops normal.    Will need follow up with Dr. Casiano.  She was to see Dr. Carreon but she wishes to keep her records and doctors at [facility].    Patient given information on scheduling follow up appointment with Dr. Casiano.      ECHO Summary 2/12/23     The left ventricle is normal in size with mild concentric hypertrophy and normal systolic function.   The estimated ejection fraction is 55%.   Atrial fibrillation not observed.   Indeterminate left ventricular diastolic function.   Mild mitral regurgitation.   Mild tricuspid regurgitation.      Pernicious anemia  Recent b12 420 and folate not low    Iron deficiency anemia  Most likely from chronic blood loss.    Anemia panel pending.    Transfusing.    Most likely the source of her worsening SOB but has appointment to be evaluated for CHF as OP and will order echo and have her follow with Dr. Casiano.  Will check a proBNP and fasting lipids for completeness.      02/13 iron therapy and iron IV if needed      ETOH abuse  Will give banana bag and monitor closely.  Patient reported has a few drinks daily.    INR is normal but esophageal varices in the DDx.        Insomnia  Home trazodone at 100 mg daily    outpt sleep evaluation, referral sent      Hypothyroidism  TSH normal on current dose of synthroid.        Hypertension  Will hold bystolic at present due to symptomatic anemia.     -Restart as indicated       Anxiety and depression  Patient has recurrent depression which is unknown and is currently controlled. Will Continue anti-depressant medications. We will not consult psychiatry at this time. Patient does not display psychosis at this time. Continue to monitor closely and adjust plan of care as needed.    On trintellix and not on our formulary.  Can take home meds or restart at discharge.           Idiopathic thrombocytopenic purpura  Stable and will monitor.  Platelets at 81 K.    Coags are normal.      -Has history of ITP and used to follow with Dr. Bolton has appt scheduled for care with Dr. Hubbard on 2/22/23        Final Active Diagnoses:    Diagnosis Date Noted POA    PRINCIPAL PROBLEM:  UGIB (upper gastrointestinal bleed) [K92.2] 02/11/2023 Yes    Suspected sleep apnea [R29.818] 02/13/2023 Yes    Left bundle branch block [I44.7] 02/12/2023 Yes    Alcohol use [Z78.9] 02/12/2023 Yes    History of ITP [Z86.2] 02/12/2023 Not Applicable    Symptomatic anemia [D64.9] 02/12/2023 Yes    Pernicious anemia [D51.0]  Yes    Iron deficiency anemia [D50.9] 01/18/2023 Yes    ETOH abuse [F10.10] 10/24/2022 Yes    Idiopathic thrombocytopenic purpura [D69.3] 07/19/2022 Yes    Insomnia [G47.00] 07/19/2022 Yes    Hypothyroidism [E03.9] 07/19/2022 Yes    Anxiety and depression [F41.9, F32.A]  Yes    Hypertension [I10]  Yes      Problems Resolved During this Admission:       Discharged Condition: stable    Disposition: Home or Self Care    Follow Up:   Follow-up Information     Papa Melton DO. Schedule an appointment as soon as possible for a visit in 2 week(s).    Specialty: Family Medicine  Why: sleep evaluation  Please follow up at the [facility] on March 29 at 3:00  Contact information:  [address]  [facility]  [place] [address]  [phone]             Dagoberto Ravi DO. Schedule an appointment as soon as possible for a visit in 1 week(s).    Specialty: Family Medicine  Why: Hospital d/c follow up on February 20 at 1:15  Contact information:  [address]  [facility] [place] [address]  [phone]             Tabitha Hubbard MD Follow up.    Specialty: Hematology and Oncology  Why: Keep follow up appt scheduled for 2/22.  Contact information:  [address]  [place] [address]  [phone]                       Patient  Instructions:      Diet Cardiac     Notify your health care provider if you experience any of the following:  difficulty breathing or increased cough     Notify your health care provider if you experience any of the following:  persistent dizziness, light-headedness, or visual disturbances     Notify your health care provider if you experience any of the following:  increased confusion or weakness     Notify your health care provider if you experience any of the following:  severe uncontrolled pain     Notify your health care provider if you experience any of the following:  temperature >100.4     Activity as tolerated       Significant Diagnostic Studies: Labs:   BMP:   Recent Labs   Lab 02/13/23 0532   GLU 84      K 3.6      CO2 28   BUN 9   CREATININE 0.97   CALCIUM 8.3*   , CBC   Recent Labs   Lab 02/13/23 0532 02/14/23 0542   WBC 5.06 6.06   HGB 7.7* 8.1*   HCT 25.2* 27.9*   PLT 65* 83*    and All labs within the past 24 hours have been reviewed    Pending Diagnostic Studies:     Procedure Component Value Units Date/Time    EXTRA TUBES [[id number]] Collected: 02/12/23 0414    Order Status: Sent Lab Status: In process Updated: 02/12/23 0414    Specimen: Blood, Venous     Narrative:      The following orders were created for panel order EXTRA TUBES.  Procedure                               Abnormality         Status                     ---------                               -----------         ------                     Red Top Hold[[id number]]                                     In process                   Please view results for these tests on the individual orders.         Medications:  Reconciled Home Medications:      Medication List      CHANGE how you take these medications    pantoprazole 40 MG tablet  Commonly known as: PROTONIX  Take 1 tablet (40 mg total) by mouth 2 (two) times daily. for 14 days  What changed: when to take this        CONTINUE taking these medications    BYSTOLIC 5 MG  Tab  Generic drug: nebivoloL  Take 5 mg by mouth once daily.     calcium carbonate-vitamin D3 500 mg-3.125 mcg (125 unit) per tablet  Take 1 tablet by mouth once daily.     CENTRUM SILVER WOMEN ORAL  Take 1 tablet by mouth once daily.     cetirizine-pseudoephedrine 5-120 mg Tb12  Take 1 tablet by mouth once daily.     cholecalciferol (vitamin D3) 125 mcg (5,000 unit) Tab  Take 5,000 Units by mouth once daily.     DurezoL 0.05 % Drop ophthalmic solution  Generic drug: difluprednate  Place 1 drop into the right eye 2 (two) times daily as needed.     ferrous sulfate 325 mg (65 mg iron) Tab tablet  Commonly known as: FEOSOL  Take 325 mg by mouth 2 (two) times daily.     furosemide 20 MG tablet  Commonly known as: LASIX  Take 40 mg by mouth once daily.     mupirocin 2 % ointment  Commonly known as: BACTROBAN  SMARTSIG:Sparingly Both Nares Twice Daily     potassium chloride 10 MEQ Tbsr  Commonly known as: KLOR-CON  Take 10 mEq by mouth once daily.     SYNTHROID 125 MCG tablet  Generic drug: levothyroxine  Take 125 mcg by mouth once daily.     traZODone 100 MG tablet  Commonly known as: DESYREL  Take 1 tablet (100 mg total) by mouth every evening.     TRINTELLIX 10 mg Tab  Generic drug: vortioxetine  TAKE 1 TABLET BY MOUTH ONCE DAILY        STOP taking these medications    meclizine 25 mg tablet  Commonly known as: ANTIVERT     ondansetron 8 MG tablet  Commonly known as: ZOFRAN            Indwelling Lines/Drains at time of discharge:   Lines/Drains/Airways     None             The patient has been seen and examined by both myself and Dr Jewel Elam MD on the date of discharge and determined to be suitable/stable for discharge.    Time spent on the discharge of patient: Greater than 30 minutes         ANA Domingo  Department of Hospital Medicine  [facility]

## 2023-02-14 NOTE — ASSESSMENT & PLAN NOTE
Anemia panel pending.  Heme positive  Transfusing 2 UpRBC to keep hgb above 8 in case of procedure  MIKHAIL for DVT prophylaxis. No anticoagulation due to bleed.    Appreciate GI assistance and eval for endoscopy.    Continue IV PPI.      EGD Impression  Overall Impression:   - 2-cm hiatal hernia  - Minimal gastritis  - The exam was otherwise normal  - No varices, esophagitis, mass, peptic ulcer, AVM, blood/bleeding     Recommendation    No source of bleeding found on EGD  Proceed with colonoscopy      Outcome of procedure: successful EGD  Disposition: proceed with colonoscopy  Provisions for follow up: please call my office for any unexpected symptoms like chest or abdominal pain or bleeding following your procedure.  Final Diagnosis: anemia

## 2023-02-14 NOTE — ASSESSMENT & PLAN NOTE
Patient told she has CHF and abnormal EKG.    Will check echo in AM.  EKG LBBB and trops normal.    Will need follow up with Dr. Casiano.  She was to see Dr. Carreon but she wishes to keep her records and doctors at [facility].    Patient given information on scheduling follow up appointment with Dr. Casiano.      ECHO Summary 2/12/23     The left ventricle is normal in size with mild concentric hypertrophy and normal systolic function.   The estimated ejection fraction is 55%.   Atrial fibrillation not observed.   Indeterminate left ventricular diastolic function.   Mild mitral regurgitation.   Mild tricuspid regurgitation.

## 2023-02-14 NOTE — SUBJECTIVE & OBJECTIVE
Interval History:     Review of Systems   Constitutional:  Positive for fatigue. Negative for appetite change and fever.   HENT:  Negative for congestion, hearing loss and trouble swallowing.    Respiratory:  Negative for chest tightness, shortness of breath and wheezing.    Cardiovascular:  Negative for chest pain and palpitations.   Gastrointestinal:  Negative for abdominal pain, constipation and nausea.   Genitourinary:  Negative for difficulty urinating and dysuria.   Musculoskeletal:  Negative for back pain and neck stiffness.   Skin:  Negative for pallor and rash.   Neurological:  Negative for dizziness, speech difficulty and headaches.   Psychiatric/Behavioral:  Positive for sleep disturbance. Negative for confusion and suicidal ideas.    Objective:     Vital Signs (Most Recent):  Temp: 98.7 °F (37.1 °C) (02/13/23 1901)  Pulse: 81 (02/13/23 1901)  Resp: 18 (02/13/23 1901)  BP: (!) 146/51 (nurse was notified) (02/13/23 1901)  SpO2: (!) 92 % (02/13/23 1901)   Vital Signs (24h Range):  Temp:  [97 °F (36.1 °C)-98.8 °F (37.1 °C)] 98.7 °F (37.1 °C)  Pulse:  [72-93] 81  Resp:  [15-21] 18  SpO2:  [92 %-100 %] 92 %  BP: (120-180)/(44-80) 146/51     Weight: 88.5 kg (195 lb)  Body mass index is 33.47 kg/m².    Intake/Output Summary (Last 24 hours) at 2/13/2023 2210  Last data filed at 2/13/2023 1538  Gross per 24 hour   Intake 300 ml   Output --   Net 300 ml      Physical Exam  Vitals reviewed.   Constitutional:       General: She is awake. She is not in acute distress.     Appearance: She is well-developed. She is obese. She is not toxic-appearing.   HENT:      Head: Normocephalic.      Nose: Nose normal.      Mouth/Throat:      Pharynx: Oropharynx is clear.   Eyes:      Extraocular Movements: Extraocular movements intact.      Pupils: Pupils are equal, round, and reactive to light.   Neck:      Thyroid: No thyroid mass.      Vascular: No carotid bruit.   Cardiovascular:      Rate and Rhythm: Normal rate and regular  rhythm.      Pulses: Normal pulses.      Heart sounds: Normal heart sounds. No murmur heard.  Pulmonary:      Effort: Pulmonary effort is normal.      Breath sounds: Normal breath sounds and air entry. No wheezing.   Abdominal:      General: Bowel sounds are normal. There is no distension.      Palpations: Abdomen is soft.      Tenderness: There is no abdominal tenderness.   Musculoskeletal:         General: Normal range of motion.      Cervical back: Neck supple. No rigidity.   Skin:     General: Skin is warm.      Coloration: Skin is not jaundiced.      Findings: No lesion.   Neurological:      General: No focal deficit present.      Mental Status: She is alert and oriented to person, place, and time.      Cranial Nerves: No cranial nerve deficit.   Psychiatric:         Attention and Perception: Attention normal.         Mood and Affect: Mood normal.         Behavior: Behavior normal. Behavior is cooperative.         Thought Content: Thought content normal.         Cognition and Memory: Cognition normal.       Significant Labs: All pertinent labs within the past 24 hours have been reviewed.  BMP:   Recent Labs   Lab 02/13/23  0532   GLU 84      K 3.6      CO2 28   BUN 9   CREATININE 0.97   CALCIUM 8.3*     CBC:   Recent Labs   Lab 02/12/23  0405 02/13/23  0532   WBC 4.55 5.06   HGB 7.7* 7.7*   HCT 25.0* 25.2*   PLT 64* 65*     CMP:   Recent Labs   Lab 02/13/23  0532      K 3.6      CO2 28   GLU 84   BUN 9   CREATININE 0.97   CALCIUM 8.3*   ANIONGAP 12       Significant Imaging: I have reviewed all pertinent imaging results/findings within the past 24 hours.

## 2023-02-14 NOTE — PLAN OF CARE
[facility]  Discharge Final Note    Primary Care Provider: Dagoberto Ravi DO    Expected Discharge Date: 2/14/2023    Final Discharge Note (most recent)       Final Note - 02/14/23 1355          Final Note    Assessment Type Final Discharge Note     Anticipated Discharge Disposition Home or Self Care        Post-Acute Status    Discharge Delays None known at this time                     Important Message from Medicare             Contact Info       Papa Melton DO   Specialty: Family Medicine    [address]  [facility]  [place] [address]   Phone: [phone]       Next Steps: Schedule an appointment as soon as possible for a visit in 2 week(s)    Instructions: sleep evaluation  Please follow up at the [facility] on March 29 at 3:00    Dagoberto Ravi DO   Specialty: Family Medicine   Relationship: PCP - General    [address]  [facility] [place] [address]   Phone: [phone]       Next Steps: Schedule an appointment as soon as possible for a visit in 1 week(s)    Instructions: Hospital d/c follow up on February 20 at 1:15    Tabitha Hubbard MD   Specialty: Hematology and Oncology    [address] [address]  [place] [address]   Phone: [phone]       Next Steps: Follow up    Instructions: Keep follow up appt scheduled for 2/22.          [facility]  Initial Discharge Assessment       Primary Care Provider: Dagoberto Ravi DO    Admission Diagnosis: Shortness of breath [R06.02]  SOB (shortness of breath) [R06.02]  Alcohol use [Z78.9]  History of ITP [Z86.2]  Gastrointestinal hemorrhage, unspecified gastrointestinal hemorrhage type [K92.2]    Admission Date: 2/11/2023  Expected Discharge Date: 2/14/2023    Discharge Barriers Identified: None    Payor: [employer] [facility] / Plan: [employer] / Product Type: Commercial /     No emergency contact information on file.    Discharge Plan A:  Home  Discharge Plan B: Home      [facility] - [place], [place] - [address] [facility].  [address] [place] [address]  [place] [address]  Phone: [phone] Fax: [phone]    [facility], [place] - [address]  [address]  [place] [address]  Phone: [phone] Fax: [phone]      Initial Assessment (most recent)       Adult Discharge Assessment - 02/14/23 1350          Discharge Assessment    Assessment Type Discharge Planning Assessment     Confirmed/corrected address, phone number and insurance Yes     Confirmed Demographics Correct on Facesheet     Source of Information patient     Does patient/caregiver understand observation status Yes     Communicated TO with patient/caregiver Yes     People in Home spouse     Facility Arrived From: home     Do you expect to return to your current living situation? Yes     Current cognitive status: Alert/Oriented     Home Layout Able to live on 1st floor     Equipment Currently Used at Home none     Readmission within 30 days? No     Are you on dialysis? No     Do you take coumadin? No     Discharge Plan A Home     Discharge Plan B Home     DME Needed Upon Discharge  none     Discharge Barriers Identified None        Physical Activity    On average, how many days per week do you engage in moderate to strenuous exercise (like a brisk walk)? 0 days     On average, how many minutes do you engage in exercise at this level? 0 min        Financial Resource Strain    How hard is it for you to pay for the very basics like food, housing, medical care, and heating? Not hard at all        Housing Stability    In the last 12 months, was there a time when you were not able to pay the mortgage or rent on time? No     In the last 12 months, was there a time when you did not have a steady place to sleep or slept in a shelter (including now)? No        Transportation Needs    In the past 12 months, has lack of transportation kept you from medical appointments or from  getting medications? No     In the past 12 months, has lack of transportation kept you from meetings, work, or from getting things needed for daily living? No        Food Insecurity    Within the past 12 months, you worried that your food would run out before you got the money to buy more. Never true     Within the past 12 months, the food you bought just didn't last and you didn't have money to get more. Never true        Stress    Do you feel stress - tense, restless, nervous, or anxious, or unable to sleep at night because your mind is troubled all the time - these days? Not at all        Social Connections    In a typical week, how many times do you talk on the phone with family, friends, or neighbors? More than three times a week     How often do you get together with friends or relatives? More than three times a week     How often do you attend Anabaptist or Denominational services? More than 4 times per year     Do you belong to any clubs or organizations such as Anabaptist groups, unions, fraternal or athletic groups, or school groups? Yes     How often do you attend meetings of the clubs or organizations you belong to? More than 4 times per year     Are you , , , , never , or living with a partner?         Alcohol Use    Q1: How often do you have a drink containing alcohol? Never     Q2: How many drinks containing alcohol do you have on a typical day when you are drinking? Patient does not drink     Q3: How often do you have six or more drinks on one occasion? Never                   Pt to dc this day. 0 dc needs

## 2023-02-14 NOTE — PROGRESS NOTES
[facility] Medicine  Progress Note    Patient Name: Ene Martin  MRN: [id number]  Patient Class: IP- Inpatient   Admission Date: 2/11/2023  Length of Stay: 2 days  Attending Physician: Jewel Elam MD  Primary Care Provider: Dagoberto Ravi DO        Subjective:     Principal Problem:UGIB (upper gastrointestinal bleed)        HPI:  66 yo F, who appears younger than stated age, presents to the ED with c/o generalized weakness and worsening SOB.  She states that it began with exertion ut now has progressed to even at rest.  She was told she had CHF and has an appointment with Dr. Carreon to get an echo week after next but she was just too weak and dyspneic to wait until next week.      In the ED she was found to have a microcytic anemia with hgb 5.  She states that she is not on any blood thinners but she has noted dark stools but takes iron for previous ARIANA and B12 for previous pernicious anemia.  She has had no noted bleeding or N/V/D or constipation.  She has no abdominal pain or dysphagia or unexplained weight loss.  She does take a PPI at home.  She also states that she was diagnosed with ITP several years ago by Dr. Tristen Alvarez when her platelets were 40 K (currently 80 K) but she has not had a problem in years and noted no easy bruising or rash like lesions.        Overview/Hospital Course:  02/12: No complaints this morning. Hgb 7.7 after 2 units PRBC. GI evaluation pending.     02/13 Records reviewed. No clear bleeding site seen on EGD or coloscopy. Does have significant iron def anemia. Watch on iron therapy.       Interval History:     Review of Systems   Constitutional:  Positive for fatigue. Negative for appetite change and fever.   HENT:  Negative for congestion, hearing loss and trouble swallowing.    Respiratory:  Negative for chest tightness, shortness of breath and wheezing.    Cardiovascular:  Negative for chest pain and palpitations.   Gastrointestinal:   Negative for abdominal pain, constipation and nausea.   Genitourinary:  Negative for difficulty urinating and dysuria.   Musculoskeletal:  Negative for back pain and neck stiffness.   Skin:  Negative for pallor and rash.   Neurological:  Negative for dizziness, speech difficulty and headaches.   Psychiatric/Behavioral:  Positive for sleep disturbance. Negative for confusion and suicidal ideas.    Objective:     Vital Signs (Most Recent):  Temp: 98.7 °F (37.1 °C) (02/13/23 1901)  Pulse: 81 (02/13/23 1901)  Resp: 18 (02/13/23 1901)  BP: (!) 146/51 (nurse was notified) (02/13/23 1901)  SpO2: (!) 92 % (02/13/23 1901)   Vital Signs (24h Range):  Temp:  [97 °F (36.1 °C)-98.8 °F (37.1 °C)] 98.7 °F (37.1 °C)  Pulse:  [72-93] 81  Resp:  [15-21] 18  SpO2:  [92 %-100 %] 92 %  BP: (120-180)/(44-80) 146/51     Weight: 88.5 kg (195 lb)  Body mass index is 33.47 kg/m².    Intake/Output Summary (Last 24 hours) at 2/13/2023 2210  Last data filed at 2/13/2023 1538  Gross per 24 hour   Intake 300 ml   Output --   Net 300 ml      Physical Exam  Vitals reviewed.   Constitutional:       General: She is awake. She is not in acute distress.     Appearance: She is well-developed. She is obese. She is not toxic-appearing.   HENT:      Head: Normocephalic.      Nose: Nose normal.      Mouth/Throat:      Pharynx: Oropharynx is clear.   Eyes:      Extraocular Movements: Extraocular movements intact.      Pupils: Pupils are equal, round, and reactive to light.   Neck:      Thyroid: No thyroid mass.      Vascular: No carotid bruit.   Cardiovascular:      Rate and Rhythm: Normal rate and regular rhythm.      Pulses: Normal pulses.      Heart sounds: Normal heart sounds. No murmur heard.  Pulmonary:      Effort: Pulmonary effort is normal.      Breath sounds: Normal breath sounds and air entry. No wheezing.   Abdominal:      General: Bowel sounds are normal. There is no distension.      Palpations: Abdomen is soft.      Tenderness: There is no  abdominal tenderness.   Musculoskeletal:         General: Normal range of motion.      Cervical back: Neck supple. No rigidity.   Skin:     General: Skin is warm.      Coloration: Skin is not jaundiced.      Findings: No lesion.   Neurological:      General: No focal deficit present.      Mental Status: She is alert and oriented to person, place, and time.      Cranial Nerves: No cranial nerve deficit.   Psychiatric:         Attention and Perception: Attention normal.         Mood and Affect: Mood normal.         Behavior: Behavior normal. Behavior is cooperative.         Thought Content: Thought content normal.         Cognition and Memory: Cognition normal.       Significant Labs: All pertinent labs within the past 24 hours have been reviewed.  BMP:   Recent Labs   Lab 02/13/23  0532   GLU 84      K 3.6      CO2 28   BUN 9   CREATININE 0.97   CALCIUM 8.3*     CBC:   Recent Labs   Lab 02/12/23  0405 02/13/23  0532   WBC 4.55 5.06   HGB 7.7* 7.7*   HCT 25.0* 25.2*   PLT 64* 65*     CMP:   Recent Labs   Lab 02/13/23  0532      K 3.6      CO2 28   GLU 84   BUN 9   CREATININE 0.97   CALCIUM 8.3*   ANIONGAP 12       Significant Imaging: I have reviewed all pertinent imaging results/findings within the past 24 hours.      Assessment/Plan:      * UGIB (upper gastrointestinal bleed)  Anemia panel pending.  Heme positive  Transfusing 2 UpRBC to keep hgb above 8 in case of procedure  MIKHAIL for DVT prophylaxis. No anticoagulation due to bleed.    Appreciate GI assistance and eval for endoscopy.    Continue IV PPI.          Suspected sleep apnea  Nocturia awakening and naps daytime  outpt sleep study she wants in [place]      Symptomatic anemia   Monitor h/h even after dc      Left bundle branch block  Patient told she has CHF and abnormal EKG.    Will check echo in AM.  EKG LBBB and trops normal.    Will need follow up with Dr. Casiano.  She was to see Dr. Carreon but she wishes to keep her records and  doctors at [facility].    Patient given information on scheduling follow up appointment with Dr. Casiano.        Pernicious anemia    Recent b12 420 and folate not low    Iron deficiency anemia  Most likely from chronic blood loss.    Anemia panel pending.    Transfusing.    Most likely the source of her worsening SOB but has appointment to be evaluated for CHF as OP and will order echo and have her follow with Dr. Casiano.  Will check a proBNP and fasting lipids for completeness.      02/13 iron therapy and iron IV if needed      ETOH abuse  Will give banana bag and monitor closely.  Patient reported has a few drinks daily.    INR is normal but esophageal varices in the DDx.        Insomnia  Home trazodone at 100 mg daily    outpt sleep evaluation      Hypothyroidism  TSH normal on current dose of synthroid.        Hypertension  Will hold bystolic at present due to symptomatic anemia.        Anxiety and depression  Patient has recurrent depression which is unknown and is currently controlled. Will Continue anti-depressant medications. We will not consult psychiatry at this time. Patient does not display psychosis at this time. Continue to monitor closely and adjust plan of care as needed.    On trintellix and not on our formulary.  Can take home meds or restart at discharge.          Idiopathic thrombocytopenic purpura  Stable and will monitor.  Platelets at 81 K.    Coags are normal.          VTE Risk Mitigation (From admission, onward)    None          Discharge Planning   TO:      Code Status: Full Code   Is the patient medically ready for discharge?:     Reason for patient still in hospital (select all that apply): Laboratory test, Treatment, Imaging and Consult recommendations                     Jewel Elam MD  Department of Hospital Medicine   [facility]

## 2023-02-14 NOTE — ASSESSMENT & PLAN NOTE
Most likely from chronic blood loss.    Anemia panel pending.    Transfusing.    Most likely the source of her worsening SOB but has appointment to be evaluated for CHF as OP and will order echo and have her follow with Dr. Casiano.  Will check a proBNP and fasting lipids for completeness.      02/13 iron therapy and iron IV if needed

## 2023-02-14 NOTE — NURSING
Pt refused to take this iron infusion, Dr. Tran notified and he said that is fine to just put pt refused and allow her to speak with her PCP Dr. Ravi.

## 2023-02-14 NOTE — ASSESSMENT & PLAN NOTE
Stable and will monitor.  Platelets at 81 K.    Coags are normal.      -Has history of ITP and used to follow with Dr. Bolton, has appt scheduled for care with Dr. Hubbard on 2/22/23

## 2023-02-20 ENCOUNTER — OFFICE VISIT (OUTPATIENT)
Dept: FAMILY MEDICINE | Facility: CLINIC | Age: 68
End: 2023-02-20
Payer: COMMERCIAL

## 2023-02-20 VITALS
HEART RATE: 60 BPM | TEMPERATURE: 98 F | HEIGHT: 64 IN | OXYGEN SATURATION: 97 % | WEIGHT: 192 LBS | RESPIRATION RATE: 20 BRPM | SYSTOLIC BLOOD PRESSURE: 130 MMHG | BODY MASS INDEX: 32.78 KG/M2 | DIASTOLIC BLOOD PRESSURE: 70 MMHG

## 2023-02-20 DIAGNOSIS — Z13.1 DIABETES MELLITUS SCREENING: ICD-10-CM

## 2023-02-20 DIAGNOSIS — Z11.59 NEED FOR HEPATITIS C SCREENING TEST: ICD-10-CM

## 2023-02-20 DIAGNOSIS — D50.9 IRON DEFICIENCY ANEMIA, UNSPECIFIED IRON DEFICIENCY ANEMIA TYPE: ICD-10-CM

## 2023-02-20 DIAGNOSIS — Z12.31 SCREENING MAMMOGRAM FOR BREAST CANCER: ICD-10-CM

## 2023-02-20 DIAGNOSIS — I50.9 CONGESTIVE HEART FAILURE, UNSPECIFIED HF CHRONICITY, UNSPECIFIED HEART FAILURE TYPE: Primary | ICD-10-CM

## 2023-02-20 DIAGNOSIS — D64.9 ANEMIA, UNSPECIFIED TYPE: ICD-10-CM

## 2023-02-20 LAB
ANISOCYTOSIS BLD QL SMEAR: ABNORMAL
BASOPHILS # BLD AUTO: 0.1 K/UL (ref 0–0.2)
BASOPHILS NFR BLD AUTO: 1.3 % (ref 0–1)
DIFFERENTIAL METHOD BLD: ABNORMAL
EOSINOPHIL # BLD AUTO: 0.23 K/UL (ref 0–0.5)
EOSINOPHIL NFR BLD AUTO: 3 % (ref 1–4)
ERYTHROCYTE [DISTWIDTH] IN BLOOD BY AUTOMATED COUNT: 23.9 % (ref 11.5–14.5)
EST. AVERAGE GLUCOSE BLD GHB EST-MCNC: 97 MG/DL
HBA1C MFR BLD HPLC: 5.5 % (ref 4.5–6.6)
HCT VFR BLD AUTO: 31.8 % (ref 38–47)
HCV AB SER QL: NORMAL
HGB BLD-MCNC: 9.6 G/DL (ref 12–16)
HYPOCHROMIA BLD QL SMEAR: ABNORMAL
IMM GRANULOCYTES # BLD AUTO: 0.02 K/UL (ref 0–0.04)
IMM GRANULOCYTES NFR BLD: 0.3 % (ref 0–0.4)
LYMPHOCYTES # BLD AUTO: 1.41 K/UL (ref 1–4.8)
LYMPHOCYTES NFR BLD AUTO: 18.2 % (ref 27–41)
MCH RBC QN AUTO: 24.3 PG (ref 27–31)
MCHC RBC AUTO-ENTMCNC: 30.2 G/DL (ref 32–36)
MCV RBC AUTO: 80.5 FL (ref 80–96)
MONOCYTES # BLD AUTO: 0.64 K/UL (ref 0–0.8)
MONOCYTES NFR BLD AUTO: 8.3 % (ref 2–6)
MPC BLD CALC-MCNC: ABNORMAL G/DL
NEUTROPHILS # BLD AUTO: 5.34 K/UL (ref 1.8–7.7)
NEUTROPHILS NFR BLD AUTO: 68.9 % (ref 53–65)
NRBC # BLD AUTO: 0 X10E3/UL
NRBC, AUTO (.00): 0 %
OVALOCYTES BLD QL SMEAR: ABNORMAL
PLATELET # BLD AUTO: 139 K/UL (ref 150–400)
PLATELET MORPHOLOGY: ABNORMAL
POLYCHROMASIA BLD QL SMEAR: ABNORMAL
RBC # BLD AUTO: 3.95 M/UL (ref 4.2–5.4)
WBC # BLD AUTO: 7.74 K/UL (ref 4.5–11)

## 2023-02-20 PROCEDURE — 1125F AMNT PAIN NOTED PAIN PRSNT: CPT | Mod: ,,, | Performed by: FAMILY MEDICINE

## 2023-02-20 PROCEDURE — 83036 HEMOGLOBIN GLYCOSYLATED A1C: CPT | Mod: ,,, | Performed by: CLINICAL MEDICAL LABORATORY

## 2023-02-20 PROCEDURE — 3075F PR MOST RECENT SYSTOLIC BLOOD PRESS GE 130-139MM HG: ICD-10-PCS | Mod: ,,, | Performed by: FAMILY MEDICINE

## 2023-02-20 PROCEDURE — 4010F ACE/ARB THERAPY RXD/TAKEN: CPT | Mod: ,,, | Performed by: FAMILY MEDICINE

## 2023-02-20 PROCEDURE — 3008F BODY MASS INDEX DOCD: CPT | Mod: ,,, | Performed by: FAMILY MEDICINE

## 2023-02-20 PROCEDURE — 99213 PR OFFICE/OUTPT VISIT, EST, LEVL III, 20-29 MIN: ICD-10-PCS | Mod: ,,, | Performed by: FAMILY MEDICINE

## 2023-02-20 PROCEDURE — 1159F PR MEDICATION LIST DOCUMENTED IN MEDICAL RECORD: ICD-10-PCS | Mod: ,,, | Performed by: FAMILY MEDICINE

## 2023-02-20 PROCEDURE — 3288F PR FALLS RISK ASSESSMENT DOCUMENTED: ICD-10-PCS | Mod: ,,, | Performed by: FAMILY MEDICINE

## 2023-02-20 PROCEDURE — 3078F PR MOST RECENT DIASTOLIC BLOOD PRESSURE < 80 MM HG: ICD-10-PCS | Mod: ,,, | Performed by: FAMILY MEDICINE

## 2023-02-20 PROCEDURE — 86803 HEPATITIS C AB TEST: CPT | Mod: ,,, | Performed by: CLINICAL MEDICAL LABORATORY

## 2023-02-20 PROCEDURE — 1101F PR PT FALLS ASSESS DOC 0-1 FALLS W/OUT INJ PAST YR: ICD-10-PCS | Mod: ,,, | Performed by: FAMILY MEDICINE

## 2023-02-20 PROCEDURE — 3044F PR MOST RECENT HEMOGLOBIN A1C LEVEL <7.0%: ICD-10-PCS | Mod: ,,, | Performed by: FAMILY MEDICINE

## 2023-02-20 PROCEDURE — 3044F HG A1C LEVEL LT 7.0%: CPT | Mod: ,,, | Performed by: FAMILY MEDICINE

## 2023-02-20 PROCEDURE — 3075F SYST BP GE 130 - 139MM HG: CPT | Mod: ,,, | Performed by: FAMILY MEDICINE

## 2023-02-20 PROCEDURE — 3078F DIAST BP <80 MM HG: CPT | Mod: ,,, | Performed by: FAMILY MEDICINE

## 2023-02-20 PROCEDURE — 3008F PR BODY MASS INDEX (BMI) DOCUMENTED: ICD-10-PCS | Mod: ,,, | Performed by: FAMILY MEDICINE

## 2023-02-20 PROCEDURE — 83036 HEMOGLOBIN A1C: ICD-10-PCS | Mod: ,,, | Performed by: CLINICAL MEDICAL LABORATORY

## 2023-02-20 PROCEDURE — 85025 COMPLETE CBC W/AUTO DIFF WBC: CPT | Mod: ,,, | Performed by: CLINICAL MEDICAL LABORATORY

## 2023-02-20 PROCEDURE — 86803 HEPATITIS C ANTIBODY: ICD-10-PCS | Mod: ,,, | Performed by: CLINICAL MEDICAL LABORATORY

## 2023-02-20 PROCEDURE — 99213 OFFICE O/P EST LOW 20 MIN: CPT | Mod: ,,, | Performed by: FAMILY MEDICINE

## 2023-02-20 PROCEDURE — 1111F PR DISCHARGE MEDS RECONCILED W/ CURRENT OUTPATIENT MED LIST: ICD-10-PCS | Mod: ,,, | Performed by: FAMILY MEDICINE

## 2023-02-20 PROCEDURE — 1101F PT FALLS ASSESS-DOCD LE1/YR: CPT | Mod: ,,, | Performed by: FAMILY MEDICINE

## 2023-02-20 PROCEDURE — 4010F PR ACE/ARB THEARPY RXD/TAKEN: ICD-10-PCS | Mod: ,,, | Performed by: FAMILY MEDICINE

## 2023-02-20 PROCEDURE — 3288F FALL RISK ASSESSMENT DOCD: CPT | Mod: ,,, | Performed by: FAMILY MEDICINE

## 2023-02-20 PROCEDURE — 1125F PR PAIN SEVERITY QUANTIFIED, PAIN PRESENT: ICD-10-PCS | Mod: ,,, | Performed by: FAMILY MEDICINE

## 2023-02-20 PROCEDURE — 1159F MED LIST DOCD IN RCRD: CPT | Mod: ,,, | Performed by: FAMILY MEDICINE

## 2023-02-20 PROCEDURE — 85025 CBC WITH DIFFERENTIAL: ICD-10-PCS | Mod: ,,, | Performed by: CLINICAL MEDICAL LABORATORY

## 2023-02-20 PROCEDURE — 1111F DSCHRG MED/CURRENT MED MERGE: CPT | Mod: ,,, | Performed by: FAMILY MEDICINE

## 2023-02-20 RX ORDER — SACUBITRIL AND VALSARTAN 24; 26 MG/1; MG/1
1 TABLET, FILM COATED ORAL 2 TIMES DAILY
COMMUNITY
Start: 2023-01-30 | End: 2023-11-30 | Stop reason: SDUPTHER

## 2023-02-20 NOTE — PROGRESS NOTES
Dagoberto Ravi DO   [facility]  [address]  [place]  [address]      PATIENT NAME: Ene Martin  : 1955  DATE: 23  MRN: [id number]      Billing Provider: Dagoberto Ravi DO  Level of Service:   Patient PCP Information       Provider PCP Type    Dagoberto Ravi DO General            Reason for Visit / Chief Complaint: Hospital Follow Up (Patient is here for hospital follow up for low H&H. She had EGD and Colonoscopy while in the hospital. She was discharged with iron by mouth for anemia.)       Update PCP  Update Chief Complaint         History of Present Illness / Problem Focused Workflow     Ene Martin presents to the clinic with Hospital Follow Up (Patient is here for hospital follow up for low H&H. She had EGD and Colonoscopy while in the hospital. She was discharged with iron by mouth for anemia.)     HPI    Review of Systems     Review of Systems    Medical / Social / Family History     Past Medical History:   Diagnosis Date    Anxiety and depression     Cataract     Encounter for blood transfusion 2023    [facility]    ETOH abuse 10/24/2022    GERD (gastroesophageal reflux disease)     History of alcohol abuse     Hyperlipidemia     Hypertension     Hypothyroidism     Idiopathic thrombocytopenic purpura     Insomnia 2022    Iron deficiency anemia 2023    Left bundle branch block     Osteoporosis     Pernicious anemia        Past Surgical History:   Procedure Laterality Date    CATARACT EXTRACTION Bilateral      SECTION      COLONOSCOPY  2023    diagnostic to due blood loss-Dr. Tran-repeat in 1 year    ESOPHAGOGASTRODUODENOSCOPY  2023    HYSTERECTOMY      THYROIDECTOMY         Social History  Ms.  reports that she has been smoking vaping with nicotine and cigarettes. She started smoking about 51 years ago. She has been smoking an average of .5 packs per day. She has never used smokeless tobacco. She reports  current alcohol use. She reports that she does not use drugs.    Family History  Ms.'s family history includes Lung cancer in her father; Lupus in her daughter; No Known Problems in her brother, maternal grandfather, maternal grandmother, paternal grandfather, paternal grandmother, sister, and son; Osteoporosis in her mother.    Medications and Allergies     Medications  Outpatient Medications Marked as Taking for the 2/20/23 encounter (Office Visit) with Dagoberto Ravi, DO   Medication Sig Dispense Refill    BYSTOLIC 5 mg Tab Take 5 mg by mouth once daily.      cholecalciferol, vitamin D3, 125 mcg (5,000 unit) Tab Take 5,000 Units by mouth once daily.      ENTRESTO 24-26 mg per tablet Take 1 tablet by mouth 2 (two) times daily.      ferrous sulfate (FEOSOL) 325 mg (65 mg iron) Tab tablet Take 325 mg by mouth 2 (two) times daily.      furosemide (LASIX) 20 MG tablet Take 40 mg by mouth once daily.      pantoprazole (PROTONIX) 40 MG tablet Take 1 tablet (40 mg total) by mouth 2 (two) times daily. for 14 days 28 tablet 0    potassium chloride (KLOR-CON) 10 MEQ TbSR Take 10 mEq by mouth once daily.      SYNTHROID 125 mcg tablet Take 125 mcg by mouth once daily.      traZODone (DESYREL) 100 MG tablet Take 1 tablet (100 mg total) by mouth every evening. 30 tablet 11    TRINTELLIX 10 mg Tab TAKE 1 TABLET BY MOUTH ONCE DAILY 30 tablet 1       Allergies  Review of patient's allergies indicates:   Allergen Reactions    Lisinopril Other (See Comments)     thrombocytopenia       Physical Examination     Vitals:    02/20/23 1403   BP: 130/70   Pulse: 60   Resp: 20   Temp: 98 °F (36.7 °C)     Physical Exam          Lab Results   Component Value Date    WBC 6.06 02/14/2023    HGB 8.1 (L) 02/14/2023    HCT 27.9 (L) 02/14/2023    MCV 82.1 02/14/2023    PLT 83 (L) 02/14/2023          Sodium   Date Value Ref Range Status   02/13/2023 141 136 - 145 mmol/L Final     Potassium   Date Value Ref Range Status   02/13/2023 3.6 3.5 - 5.1  mmol/L Final     Chloride   Date Value Ref Range Status   02/13/2023 105 98 - 107 mmol/L Final     CO2   Date Value Ref Range Status   02/13/2023 28 21 - 32 mmol/L Final     Glucose   Date Value Ref Range Status   02/13/2023 84 74 - 106 mg/dL Final     BUN   Date Value Ref Range Status   02/13/2023 9 7 - 18 mg/dL Final     Creatinine   Date Value Ref Range Status   02/13/2023 0.97 0.55 - 1.02 mg/dL Final     Calcium   Date Value Ref Range Status   02/13/2023 8.3 (L) 8.5 - 10.1 mg/dL Final     Total Protein   Date Value Ref Range Status   02/11/2023 7.0 6.4 - 8.2 g/dL Final     Albumin   Date Value Ref Range Status   02/11/2023 3.5 3.5 - 5.0 g/dL Final     Bilirubin, Total   Date Value Ref Range Status   02/11/2023 0.6 >0.0 - 1.2 mg/dL Final     Alk Phos   Date Value Ref Range Status   02/11/2023 80 55 - 142 U/L Final     AST   Date Value Ref Range Status   02/11/2023 61 (H) 15 - 37 U/L Final     ALT   Date Value Ref Range Status   02/11/2023 77 (H) 13 - 56 U/L Final     Anion Gap   Date Value Ref Range Status   02/13/2023 12 7 - 16 mmol/L Final     eGFR   Date Value Ref Range Status   07/18/2022 62 >=60 mL/min/1.73m² Final      Colonoscopy  Addendum: Procedure Date   2/13/23     Impression   Overall Impression:    - Erythema in the cecum favored to be related to barotrauma from  endoscopy   - No blood/bleeding, mass, AVM, or ulcerated mucosa   - Grade I Internal Hemorrhoids   - The prep was inadequate for screening     Recommendation    No obvious source on EGD/Colonoscopy for anemia   FOBT result likely related to hemorrhoids   Agree with anemia workup and replacement as needed   Recommend non-urgent colonoscopy in 1 year for screening purposes   If she has further drops in H&H, can consider clinically directed repeat  endoscopy or Tagged RBC/CT GIB protocol to help localize bleeding source        Outcome of procedure: successful Colonoscopy   Disposition: patient to recovery following procedure; return to floor  when  appropriate parameters met   Provisions for follow up: please call my office for any unexpected  symptoms like chest or abdominal pain or bleeding following your  procedure.   Final Diagnosis: anemia     Indication   anemia     Providers   Talat Tran MD Proceduralist    Volodymyr Blair II, CRNA GUICHO López, [place] Registered Nurse    Rose Leon, [place] Registered Nurse      Medications   Moderate sedation administered by anesthesia staff - See anesthesia  record.     Preprocedure   A history and physical has been performed, and patient medication  allergies have been reviewed. The patient's tolerance of previous  anesthesia has been reviewed. The risks and benefits of the procedure and  the sedation options and risks were discussed with the patient. All  questions were answered and informed consent obtained.     ASA Score: ASA 3 - Patient with moderate systemic disease with functional  limitations   Mallampati Airway Score: II (hard and soft palate, upper portion of  tonsils anduvula visible)     Details of the Procedure   The patient underwent monitored anesthesia care, which was administered by  an anesthesia professional. The patient's heart rate, blood pressure,  level of consciousness, respirations, oxygen, ECG and ETCO2 were monitored  throughout the procedure. A digital rectal exam was performed. A perianal  exam was performed. The scope was introduced through the anus and advanced  to the cecum. Retroflexion was performed in the rectum. The quality of  bowel preparation was evaluated using the [place] Bowel Preparation Scale  with scores of: right colon = 1, transverse colon = 1, left colon = 1. The  total BBPS score was 3. Bowel prep was not adequate. The patient's  estimated blood loss was minimal (<5 mL). The procedure was not difficult.  The patient tolerated the procedure well. There were no apparent  complications.      Scope: Gastroscope, Pediatric Colonoscope   Scope Serial:  [id number], [id number]     Events   Procedure Events    Event Event Time      Procedure Events    Event Event Time      CECAL WITHDRAWAL TIME: ?     Findings   There was erythema in the cecum favored to be related to barotrauma from  the endoscopy. The ileocecal valve, ascending colon, transverse colon,  descending colon and sigmoid colon appeared otherwise normal. There was  opaque residue and liquid stool throughout the colon which limited  visualization of the mucosa. There was no large mass, but flat lesions and  polyps could have been missed due to inadequate prep.   Few small, mild scattered pancolonic diverticula; no bleeding was  identified   Internal (grade 2) hemorrhoids; no bleeding was identified  Narrative: Table formatting from the original result was not included.  Procedure Date  2/13/23    Impression  Overall   Impression:    - Erythema in the cecum favored to be related to barotrauma from endoscopy  - No blood/bleeding, mass, AVM, or ulcerated mucosa  - Grade I Internal Hemorrhoids  - The prep was inadequate for screening    Recommendation   No obvious source on EGD/Colonoscopy for anemia  FOBT result likely related to hemorrhoids  Agree with anemia workup and replacement as needed  Recommend non-urgent colonoscopy for screening purposes in the future as   outpatient  If she has further drops in H&H, can consider clinically directed repeat   endoscopy or Tagged RBC/CT GIB protocol to help localize bleeding source      Outcome of procedure: successful Colonoscopy  Disposition: patient to recovery following procedure; return to floor when   appropriate parameters met  Provisions for follow up: please call my office for any unexpected   symptoms like chest or abdominal pain or bleeding following your   procedure.  Final Diagnosis: anemia    Indication  anemia    Providers  Talat Tran MD Proceduralist   Volodymyr Blair II, CRNA GUICHO López RN Registered Nurse   Rose Leon RN  Registered Nurse     Medications  Moderate sedation administered by anesthesia staff - See anesthesia   record.    Preprocedure  A history and physical has been performed, and patient medication   allergies have been reviewed. The patient's tolerance of previous   anesthesia has been reviewed. The risks and benefits of the procedure and   the sedation options and risks were discussed with the patient. All   questions were answered and informed consent obtained.    ASA Score: ASA 3 - Patient with moderate systemic disease with functional   limitations  Mallampati Airway Score: II (hard and soft palate, upper portion of   tonsils anduvula visible)    Details of the Procedure  The patient underwent monitored anesthesia care, which was administered by   an anesthesia professional. The patient's heart rate, blood pressure,   level of consciousness, respirations, oxygen, ECG and ETCO2 were monitored   throughout the procedure. A digital rectal exam was performed. A perianal   exam was performed. The scope was introduced through the anus and advanced   to the cecum. Retroflexion was performed in the rectum. The quality of   bowel preparation was evaluated using the [place] Bowel Preparation Scale   with scores of: right colon = 1, transverse colon = 1, left colon = 1. The   total BBPS score was 3. Bowel prep was not adequate. The patient's   estimated blood loss was minimal (<5 mL). The procedure was not difficult.   The patient tolerated the procedure well. There were no apparent   complications.     Scope: Gastroscope, Pediatric Colonoscope  Scope Serial: [id number], [id number]    Events  Procedure Events   Event Event Time     Procedure Events   Event Event Time     CECAL WITHDRAWAL TIME: ?    Findings  There was erythema in the cecum favored to be related to barotrauma from   the endoscopy. The ileocecal valve, ascending colon, transverse colon,   descending colon and sigmoid colon appeared otherwise normal. There was    opaque residue and liquid stool throughout the colon which limited   visualization of the mucosa. There was no large mass, but flat lesions and   polyps could have been missed due to inadequate prep.  Few small, mild scattered pancolonic diverticula; no bleeding was   identified  Internal (grade 2) hemorrhoids; no bleeding was identified  EGD  Narrative: Table formatting from the original result was not included.  Procedure Date  2/13/23    Impression  Overall   Impression:    - 2-cm hiatal hernia  - Minimal gastritis  - The exam was otherwise normal  - No varices, esophagitis, mass, peptic ulcer, AVM, blood/bleeding    Recommendation   No source of bleeding found on EGD  Proceed with colonoscopy     Outcome of procedure: successful EGD  Disposition: proceed with colonoscopy  Provisions for follow up: please call my office for any unexpected   symptoms like chest or abdominal pain or bleeding following your   procedure.  Final Diagnosis: anemia    Indication  anemia    Providers  Talat Tran MD Proceduralist   Volodymyr Blair II, CRNA GUICHO López, [place] Registered Nurse   Rose Leon RN Registered Nurse     Medications  Moderate sedation administered by anesthesia staff - See anesthesia   record.    Preprocedure  A history and physical has been performed, and patient medication   allergies have been reviewed. The patient's tolerance of previous   anesthesia has been reviewed. The risks and benefits of the procedure and   the sedation options and risks were discussed with the patient. All   questions were answered and informed consent obtained.    ASA Score: ASA 3 - Patient with moderate systemic disease with functional   limitations  Mallampati Airway Score: II (hard and soft palate, upper portion of   tonsils anduvula visible)    Details of the Procedure  The patient underwent monitored anesthesia care, which was administered by   an anesthesia professional. The patient's blood pressure, heart  rate,   level of consciousness, oxygen, respirations, ECG and ETCO2 were monitored   throughout the procedure. The scope was introduced through the mouth and   advanced to the second part of the duodenum. Retroflexion was performed in   the cardia. The patient's estimated blood loss was minimal (<5 mL). The   procedure was not difficult. The patient tolerated the procedure well.   There were no apparent complications.     Scope: Gastroscope, Pediatric Colonoscope  Scope Serial: [id number], [id number]    Events  Procedure Events   Event Event Time     Procedure Events   Event Event Time     Findings  2 cm sliding hiatal hernia (type I hiatal hernia) with Triston lesions   present - GE junction 38 cm from the incisors, diaphragmatic impression 40   cm from the incisors  Mild, localized erythematous mucosa in the antrum, consistent with   gastritis; no bleeding was identified; performed 5 cold forceps biopsies   to rule out H. pylori  The upper third of the esophagus, middle third of the esophagus, lower   third of the esophagus, Z-line, cardia, body of the stomach, incisura,   duodenal bulb, 1st part of the duodenum and 2nd part of the duodenum   appeared normal.     Procedures   Lab Results   Component Value Date    CHOL 131 02/12/2023     Lab Results   Component Value Date    HDL 38 (L) 02/12/2023     Lab Results   Component Value Date    LDLCALC 70 02/12/2023     Lab Results   Component Value Date    TRIG 117 02/12/2023     Lab Results   Component Value Date    CHOLHDL 3.4 02/12/2023      No results found for: LABA1C, HGBA1C   Lab Results   Component Value Date    TSH 1.880 02/11/2023    H8PDIHI 12.8 01/18/2023      Assessment and Plan (including Health Maintenance)      Problem List  Smart Sets  Document Outside HM   :    Plan:         Health Maintenance Due   Topic Date Due    Hepatitis C Screening  Never done    Mammogram  Never done    Hemoglobin A1c (Diabetic Prevention Screening)  Never done    DEXA Scan  Never  done    Influenza Vaccine (1) Never done       Problem List Items Addressed This Visit    None      Health Maintenance Topics with due status: Not Due       Topic Last Completion Date    Lipid Panel 02/12/2023    Colorectal Cancer Screening 02/13/2023       Future Appointments   Date Time Provider Department Center   3/29/2023  3:00 PM Papa Melton DO [facility]   2/22/2024  1:30 PM Dagoberto Ravi DO [facility]        No follow-ups on file.     Signature:  DO Fabienne Owusuatur [address] [place]  [address]    Date of encounter: 2/20/23

## 2023-02-20 NOTE — PROGRESS NOTES
Subjective:       Patient ID: Ene Martin is a 67 y.o. female.    Chief Complaint: Hospital Follow Up (Patient is here for hospital follow up for low H&H. She had EGD and Colonoscopy while in the hospital. She was discharged with iron by mouth for anemia.)    Pt is a 67 y.o.f. with a hx of CHF, ITP and hypothyroidism, presenting for hospital follow up. Pt was d/c'd on 2/14 after being treated for symptomatic anemia. She originally presented with generalized weakness and worsening SOB. In the ED she was found to have a microcytic anemia with hgb 5.  She had noted dark stools but takes iron for previous ARIANA and B12 for previous pernicious anemia.  Pt's Hgb improved after transfusion.      During today's visit, Pt reports that she feels fine and voices no complaints. She does not feel weak or dyspneic and denies dizziness, lightheadedness, rectal bleeding, hematuria and melena. Pt feels in her usual state of health. Of note, Pt has an appt to f/u with Dr. Hubbard on 2/22/23 for her ITP hx. Additionally, she has an upcoming appt with Dr. Melton for sleep study. Pt is also inquiring about re-ordering her mammogram as she was unable to appear for her previous one. Pt is also requesting to establish care with a cardiologist within the [facility].                   Current Outpatient Medications:     BYSTOLIC 5 mg Tab, Take 5 mg by mouth once daily., Disp: , Rfl:     cholecalciferol, vitamin D3, 125 mcg (5,000 unit) Tab, Take 5,000 Units by mouth once daily., Disp: , Rfl:     ENTRESTO 24-26 mg per tablet, Take 1 tablet by mouth 2 (two) times daily., Disp: , Rfl:     ferrous sulfate (FEOSOL) 325 mg (65 mg iron) Tab tablet, Take 325 mg by mouth 2 (two) times daily., Disp: , Rfl:     furosemide (LASIX) 20 MG tablet, Take 40 mg by mouth once daily., Disp: , Rfl:     pantoprazole (PROTONIX) 40 MG tablet, Take 1 tablet (40 mg total) by mouth 2 (two) times daily. for 14 days, Disp: 28 tablet, Rfl: 0    potassium chloride  (KLOR-CON) 10 MEQ TbSR, Take 10 mEq by mouth once daily., Disp: , Rfl:     traZODone (DESYREL) 100 MG tablet, Take 1 tablet (100 mg total) by mouth every evening., Disp: 30 tablet, Rfl: 11    TRINTELLIX 10 mg Tab, TAKE 1 TABLET BY MOUTH ONCE DAILY, Disp: 30 tablet, Rfl: 1    calcium carbonate-vitamin D3 500 mg-3.125 mcg (125 unit) per tablet, Take 1 tablet by mouth once daily., Disp: , Rfl:     SYNTHROID 125 mcg tablet, Take 1 tablet (125 mcg total) by mouth once daily., Disp: 30 tablet, Rfl: 5    Review of patient's allergies indicates:   Allergen Reactions    Lisinopril Other (See Comments)     thrombocytopenia       Past Medical History:   Diagnosis Date    Anxiety and depression     Cataract     Encounter for blood transfusion 2023    [facility]    ETOH abuse 10/24/2022    GERD (gastroesophageal reflux disease)     History of alcohol abuse     Hyperlipidemia     Hypertension     Hypothyroidism     Idiopathic thrombocytopenic purpura     Insomnia 2022    Iron deficiency anemia 2023    Left bundle branch block     Osteoporosis     Pernicious anemia        Past Surgical History:   Procedure Laterality Date    CATARACT EXTRACTION Bilateral      SECTION      COLONOSCOPY  2023    diagnostic to due blood loss-Dr. Tran-repeat in 1 year    ESOPHAGOGASTRODUODENOSCOPY  2023    HYSTERECTOMY      THYROIDECTOMY         Family History   Problem Relation Age of Onset    Lung cancer Father     Osteoporosis Mother     No Known Problems Sister     No Known Problems Brother     Lupus Daughter     No Known Problems Son     No Known Problems Maternal Grandmother     No Known Problems Maternal Grandfather     No Known Problems Paternal Grandmother     No Known Problems Paternal Grandfather        Social History     Tobacco Use    Smoking status: Every Day     Packs/day: 0.50     Types: Vaping with nicotine, Cigarettes     Start date:      Last attempt to quit: 2005     Years  "since quittin.1    Smokeless tobacco: Never    Tobacco comments:     Former cigarette smoker, smoked "off and on" from 5604-3254. Quit in  then picked up vaping in .    Substance Use Topics    Alcohol use: Yes     Comment: 2 cans of mixed drinks almost every night    Drug use: Never       Review of Systems   Constitutional:  Negative for chills, fatigue and fever.   Eyes:  Negative for visual disturbance.   Respiratory:  Negative for cough and shortness of breath.    Cardiovascular:  Negative for chest pain, palpitations and leg swelling.   Gastrointestinal:  Negative for abdominal pain, diarrhea, rectal pain and vomiting.   Musculoskeletal:  Negative for arthralgias.   Neurological:  Negative for dizziness and light-headedness.       Current Medications:   Medication List with Changes/Refills   Current Medications    BYSTOLIC 5 MG TAB    Take 5 mg by mouth once daily.       Start Date: 2021 End Date: --    CALCIUM CARBONATE-VITAMIN D3 500 MG-3.125 MCG (125 UNIT) PER TABLET    Take 1 tablet by mouth once daily.       Start Date: --        End Date: --    CHOLECALCIFEROL, VITAMIN D3, 125 MCG (5,000 UNIT) TAB    Take 5,000 Units by mouth once daily.       Start Date: --        End Date: --    ENTRESTO 24-26 MG PER TABLET    Take 1 tablet by mouth 2 (two) times daily.       Start Date: 2023 End Date: --    FERROUS SULFATE (FEOSOL) 325 MG (65 MG IRON) TAB TABLET    Take 325 mg by mouth 2 (two) times daily.       Start Date: --        End Date: --    FUROSEMIDE (LASIX) 20 MG TABLET    Take 40 mg by mouth once daily.       Start Date: 2021 End Date: --    PANTOPRAZOLE (PROTONIX) 40 MG TABLET    Take 1 tablet (40 mg total) by mouth 2 (two) times daily. for 14 days       Start Date: 2023 End Date: 2023    POTASSIUM CHLORIDE (KLOR-CON) 10 MEQ TBSR    Take 10 mEq by mouth once daily.       Start Date: 2021 End Date: --    TRAZODONE (DESYREL) 100 MG TABLET    Take 1 tablet (100 mg " "total) by mouth every evening.       Start Date: 7/18/2022 End Date: 7/18/2023    TRINTELLIX 10 MG TAB    TAKE 1 TABLET BY MOUTH ONCE DAILY       Start Date: 2/8/2023  End Date: --   Changed and/or Refilled Medications    Modified Medication Previous Medication    SYNTHROID 125 MCG TABLET SYNTHROID 125 mcg tablet       Take 1 tablet (125 mcg total) by mouth once daily.    Take 125 mcg by mouth once daily.       Start Date: 2/21/2023 End Date: --    Start Date: 8/19/2021 End Date: 2/21/2023   Discontinued Medications    CETIRIZINE-PSEUDOEPHEDRINE 5-120 MG TB12    Take 1 tablet by mouth once daily.       Start Date: 9/15/2021 End Date: 2/20/2023    DUREZOL 0.05 % DROP OPHTHALMIC SOLUTION    Place 1 drop into the right eye 2 (two) times daily as needed.       Start Date: 8/19/2021 End Date: 2/20/2023    MULTIVIT-MIN/IRON/FOLIC/LUTEIN (CENTRUM SILVER WOMEN ORAL)    Take 1 tablet by mouth once daily.       Start Date: --        End Date: 2/20/2023    MUPIROCIN (BACTROBAN) 2 % OINTMENT    SMARTSIG:Sparingly Both Nares Twice Daily       Start Date: 1/11/2023 End Date: 2/20/2023            Objective:        Vitals:    02/20/23 1403   BP: 130/70   BP Location: Left arm   Patient Position: Sitting   BP Method: Medium (Manual)   Pulse: 60   Resp: 20   Temp: 98 °F (36.7 °C)   TempSrc: Oral   SpO2: 97%   Weight: 87.1 kg (192 lb)   Height: 5' 4" (1.626 m)       Physical Exam  Vitals reviewed.   Constitutional:       General: She is not in acute distress.     Appearance: She is not ill-appearing, toxic-appearing or diaphoretic.   HENT:      Head: Normocephalic and atraumatic.      Right Ear: External ear normal.      Left Ear: External ear normal.      Mouth/Throat:      Mouth: Mucous membranes are moist.   Eyes:      General: No scleral icterus.     Pupils: Pupils are equal, round, and reactive to light.      Comments: Pale conjunctivae bilaterally    Cardiovascular:      Rate and Rhythm: Normal rate and regular rhythm.      " Heart sounds: Normal heart sounds. No murmur heard.    No friction rub. No gallop.   Pulmonary:      Effort: Pulmonary effort is normal. No respiratory distress.      Breath sounds: Normal breath sounds. No wheezing, rhonchi or rales.   Abdominal:      General: Bowel sounds are normal.      Palpations: Abdomen is soft.      Tenderness: There is no abdominal tenderness. There is no guarding or rebound.   Musculoskeletal:         General: No swelling.      Right lower leg: No edema.      Left lower leg: No edema.   Skin:     General: Skin is warm and dry.      Coloration: Skin is not jaundiced.      Findings: No erythema or rash.   Neurological:      Mental Status: She is alert and oriented to person, place, and time.             Lab Results   Component Value Date    WBC 7.74 02/20/2023    HGB 9.6 (L) 02/20/2023    HCT 31.8 (L) 02/20/2023     (L) 02/20/2023    CHOL 131 02/12/2023    TRIG 117 02/12/2023    HDL 38 (L) 02/12/2023    ALT 77 (H) 02/11/2023    AST 61 (H) 02/11/2023     02/13/2023    K 3.6 02/13/2023     02/13/2023    CREATININE 0.97 02/13/2023    BUN 9 02/13/2023    CO2 28 02/13/2023    TSH 1.880 02/11/2023    INR 1.12 02/11/2023    HGBA1C 5.5 02/20/2023      Assessment:       1. Congestive heart failure, unspecified HF chronicity, unspecified heart failure type    2. Screening mammogram for breast cancer    3. Anemia, unspecified type    4. Diabetes mellitus screening    5. Need for hepatitis C screening test    6. Iron deficiency anemia, unspecified iron deficiency anemia type          Plan:         Problem List Items Addressed This Visit          Oncology    Iron deficiency anemia     Will obtain repeat CBC today to monitor H/H. Pt encouraged to take her iron supplements TID. Pt also counseled at length to call, RTC or present to the ED should she experience weakness, SOB, melena, hematochezia or any other related sx. Pt understands and agrees with plan of care.           Other Visit  Diagnoses       Congestive heart failure, unspecified HF chronicity, unspecified heart failure type    -  Primary    Relevant Orders    Ambulatory referral/consult to Cardiology    Screening mammogram for breast cancer        Relevant Orders    Mammo Digital Screening Bilat    Anemia, unspecified type        Relevant Orders    CBC Auto Differential (Completed)    CBC Morphology (Completed)    Diabetes mellitus screening        Relevant Orders    Hemoglobin A1C (Completed)    Need for hepatitis C screening test        Relevant Orders    Hepatitis C Antibody (Completed)              No follow-ups on file.    Ye Barnett DO     Instructed patient that if symptoms fail to improve or worsen patient should seek immediate medical attention or report to the nearest emergency department. Patient expressed verbal agreement and understanding to this plan of care.

## 2023-02-21 RX ORDER — LEVOTHYROXINE SODIUM 125 UG/1
125 TABLET ORAL DAILY
Qty: 30 TABLET | Refills: 5 | Status: SHIPPED | OUTPATIENT
Start: 2023-02-21 | End: 2023-04-06 | Stop reason: SDUPTHER

## 2023-02-24 NOTE — ASSESSMENT & PLAN NOTE
Will obtain repeat CBC today to monitor H/H. Pt encouraged to take her iron supplements TID. Pt also counseled at length to call, RTC or present to the ED should she experience weakness, SOB, melena, hematochezia or any other related sx. Pt understands and agrees with plan of care.

## 2023-03-20 ENCOUNTER — OFFICE VISIT (OUTPATIENT)
Dept: FAMILY MEDICINE | Facility: CLINIC | Age: 68
End: 2023-03-20
Payer: COMMERCIAL

## 2023-03-20 ENCOUNTER — HOSPITAL ENCOUNTER (OUTPATIENT)
Dept: RADIOLOGY | Facility: HOSPITAL | Age: 68
Discharge: HOME | End: 2023-03-20
Attending: FAMILY MEDICINE
Payer: COMMERCIAL

## 2023-03-20 VITALS
RESPIRATION RATE: 18 BRPM | DIASTOLIC BLOOD PRESSURE: 80 MMHG | WEIGHT: 190 LBS | SYSTOLIC BLOOD PRESSURE: 156 MMHG | BODY MASS INDEX: 32.44 KG/M2 | TEMPERATURE: 97 F | HEART RATE: 86 BPM | HEIGHT: 64 IN | OXYGEN SATURATION: 98 %

## 2023-03-20 VITALS — HEIGHT: 64 IN | WEIGHT: 195 LBS | BODY MASS INDEX: 33.29 KG/M2

## 2023-03-20 DIAGNOSIS — D50.9 IRON DEFICIENCY ANEMIA, UNSPECIFIED IRON DEFICIENCY ANEMIA TYPE: ICD-10-CM

## 2023-03-20 DIAGNOSIS — I10 HYPERTENSION, UNSPECIFIED TYPE: ICD-10-CM

## 2023-03-20 DIAGNOSIS — F10.10 ETOH ABUSE: ICD-10-CM

## 2023-03-20 DIAGNOSIS — E03.9 ACQUIRED HYPOTHYROIDISM: ICD-10-CM

## 2023-03-20 DIAGNOSIS — K92.2 UGIB (UPPER GASTROINTESTINAL BLEED): ICD-10-CM

## 2023-03-20 DIAGNOSIS — Z86.2 HISTORY OF ITP: ICD-10-CM

## 2023-03-20 DIAGNOSIS — F41.9 ANXIETY AND DEPRESSION: Primary | ICD-10-CM

## 2023-03-20 DIAGNOSIS — Z12.31 SCREENING MAMMOGRAM FOR BREAST CANCER: ICD-10-CM

## 2023-03-20 DIAGNOSIS — F32.A ANXIETY AND DEPRESSION: Primary | ICD-10-CM

## 2023-03-20 DIAGNOSIS — I44.7 LEFT BUNDLE BRANCH BLOCK: ICD-10-CM

## 2023-03-20 LAB
ALBUMIN SERPL BCP-MCNC: 3.8 G/DL (ref 3.5–5)
ALBUMIN/GLOB SERPL: 1 {RATIO}
ALP SERPL-CCNC: 93 U/L (ref 55–142)
ALT SERPL W P-5'-P-CCNC: 51 U/L (ref 13–56)
ANION GAP SERPL CALCULATED.3IONS-SCNC: 10 MMOL/L (ref 7–16)
ANISOCYTOSIS BLD QL SMEAR: ABNORMAL
AST SERPL W P-5'-P-CCNC: 36 U/L (ref 15–37)
BASOPHILS # BLD AUTO: 0.06 K/UL (ref 0–0.2)
BASOPHILS NFR BLD AUTO: 0.9 % (ref 0–1)
BILIRUB SERPL-MCNC: 0.4 MG/DL (ref ?–1.2)
BUN SERPL-MCNC: 14 MG/DL (ref 7–18)
BUN/CREAT SERPL: 14 (ref 6–20)
CALCIUM SERPL-MCNC: 8.6 MG/DL (ref 8.5–10.1)
CHLORIDE SERPL-SCNC: 103 MMOL/L (ref 98–107)
CO2 SERPL-SCNC: 28 MMOL/L (ref 21–32)
CREAT SERPL-MCNC: 0.97 MG/DL (ref 0.55–1.02)
DIFFERENTIAL METHOD BLD: ABNORMAL
EGFR (NO RACE VARIABLE) (RUSH/TITUS): 64 ML/MIN/1.73M²
EOSINOPHIL # BLD AUTO: 0.15 K/UL (ref 0–0.5)
EOSINOPHIL NFR BLD AUTO: 2.2 % (ref 1–4)
ERYTHROCYTE [DISTWIDTH] IN BLOOD BY AUTOMATED COUNT: 24 % (ref 11.5–14.5)
FERRITIN SERPL-MCNC: 13 NG/ML (ref 8–252)
GGT SERPL-CCNC: 103 U/L (ref 5–55)
GLOBULIN SER-MCNC: 3.8 G/DL (ref 2–4)
GLUCOSE SERPL-MCNC: 98 MG/DL (ref 74–106)
HCT VFR BLD AUTO: 33.5 % (ref 38–47)
HGB BLD-MCNC: 10.6 G/DL (ref 12–16)
HYPOCHROMIA BLD QL SMEAR: ABNORMAL
IMM GRANULOCYTES # BLD AUTO: 0.01 K/UL (ref 0–0.04)
IMM GRANULOCYTES NFR BLD: 0.1 % (ref 0–0.4)
IRON SATN MFR SERPL: 3 % (ref 14–50)
IRON SERPL-MCNC: 17 ΜG/DL (ref 50–170)
LYMPHOCYTES # BLD AUTO: 1.76 K/UL (ref 1–4.8)
LYMPHOCYTES NFR BLD AUTO: 25.8 % (ref 27–41)
MCH RBC QN AUTO: 26.4 PG (ref 27–31)
MCHC RBC AUTO-ENTMCNC: 31.6 G/DL (ref 32–36)
MCV RBC AUTO: 83.5 FL (ref 80–96)
MONOCYTES # BLD AUTO: 0.56 K/UL (ref 0–0.8)
MONOCYTES NFR BLD AUTO: 8.2 % (ref 2–6)
MPC BLD CALC-MCNC: ABNORMAL G/DL
NEUTROPHILS # BLD AUTO: 4.29 K/UL (ref 1.8–7.7)
NEUTROPHILS NFR BLD AUTO: 62.8 % (ref 53–65)
NRBC # BLD AUTO: 0 X10E3/UL
NRBC, AUTO (.00): 0 %
PLATELET # BLD AUTO: 38 K/UL (ref 150–400)
PLATELET MORPHOLOGY: ABNORMAL
POLYCHROMASIA BLD QL SMEAR: ABNORMAL
POTASSIUM SERPL-SCNC: 4 MMOL/L (ref 3.5–5.1)
PROT SERPL-MCNC: 7.6 G/DL (ref 6.4–8.2)
RBC # BLD AUTO: 4.01 M/UL (ref 4.2–5.4)
SODIUM SERPL-SCNC: 137 MMOL/L (ref 136–145)
STOMATOCYTES BLD QL SMEAR: ABNORMAL
T4 SERPL-MCNC: 11.7 ΜG/DL (ref 4.8–13.9)
TIBC SERPL-MCNC: 526 ΜG/DL (ref 250–450)
TSH SERPL DL<=0.005 MIU/L-ACNC: 2.71 UIU/ML (ref 0.36–3.74)
WBC # BLD AUTO: 6.83 K/UL (ref 4.5–11)

## 2023-03-20 PROCEDURE — 1101F PR PT FALLS ASSESS DOC 0-1 FALLS W/OUT INJ PAST YR: ICD-10-PCS | Mod: ,,, | Performed by: FAMILY MEDICINE

## 2023-03-20 PROCEDURE — 84436 T4: ICD-10-PCS | Mod: ,,, | Performed by: CLINICAL MEDICAL LABORATORY

## 2023-03-20 PROCEDURE — 84436 ASSAY OF TOTAL THYROXINE: CPT | Mod: ,,, | Performed by: CLINICAL MEDICAL LABORATORY

## 2023-03-20 PROCEDURE — 3288F PR FALLS RISK ASSESSMENT DOCUMENTED: ICD-10-PCS | Mod: ,,, | Performed by: FAMILY MEDICINE

## 2023-03-20 PROCEDURE — 99215 OFFICE O/P EST HI 40 MIN: CPT | Mod: ,,, | Performed by: FAMILY MEDICINE

## 2023-03-20 PROCEDURE — 4010F PR ACE/ARB THEARPY RXD/TAKEN: ICD-10-PCS | Mod: ,,, | Performed by: FAMILY MEDICINE

## 2023-03-20 PROCEDURE — 99215 PR OFFICE/OUTPT VISIT, EST, LEVL V, 40-54 MIN: ICD-10-PCS | Mod: ,,, | Performed by: FAMILY MEDICINE

## 2023-03-20 PROCEDURE — 3288F FALL RISK ASSESSMENT DOCD: CPT | Mod: ,,, | Performed by: FAMILY MEDICINE

## 2023-03-20 PROCEDURE — 3079F DIAST BP 80-89 MM HG: CPT | Mod: ,,, | Performed by: FAMILY MEDICINE

## 2023-03-20 PROCEDURE — 82977 ASSAY OF GGT: CPT | Mod: ,,, | Performed by: CLINICAL MEDICAL LABORATORY

## 2023-03-20 PROCEDURE — 3044F PR MOST RECENT HEMOGLOBIN A1C LEVEL <7.0%: ICD-10-PCS | Mod: ,,, | Performed by: FAMILY MEDICINE

## 2023-03-20 PROCEDURE — 80050 GENERAL HEALTH PANEL: CPT | Mod: ,,, | Performed by: CLINICAL MEDICAL LABORATORY

## 2023-03-20 PROCEDURE — 3077F SYST BP >= 140 MM HG: CPT | Mod: ,,, | Performed by: FAMILY MEDICINE

## 2023-03-20 PROCEDURE — 82977 GAMMA GT: ICD-10-PCS | Mod: ,,, | Performed by: CLINICAL MEDICAL LABORATORY

## 2023-03-20 PROCEDURE — 82728 FERRITIN: ICD-10-PCS | Mod: ,,, | Performed by: CLINICAL MEDICAL LABORATORY

## 2023-03-20 PROCEDURE — 83550 IRON BINDING TEST: CPT | Mod: ,,, | Performed by: CLINICAL MEDICAL LABORATORY

## 2023-03-20 PROCEDURE — 80050 PR  GENERAL HEALTH PANEL: ICD-10-PCS | Mod: ,,, | Performed by: CLINICAL MEDICAL LABORATORY

## 2023-03-20 PROCEDURE — 3079F PR MOST RECENT DIASTOLIC BLOOD PRESSURE 80-89 MM HG: ICD-10-PCS | Mod: ,,, | Performed by: FAMILY MEDICINE

## 2023-03-20 PROCEDURE — 83540 ASSAY OF IRON: CPT | Mod: ,,, | Performed by: CLINICAL MEDICAL LABORATORY

## 2023-03-20 PROCEDURE — 82728 ASSAY OF FERRITIN: CPT | Mod: ,,, | Performed by: CLINICAL MEDICAL LABORATORY

## 2023-03-20 PROCEDURE — 83540 IRON AND TIBC: ICD-10-PCS | Mod: ,,, | Performed by: CLINICAL MEDICAL LABORATORY

## 2023-03-20 PROCEDURE — 3008F BODY MASS INDEX DOCD: CPT | Mod: ,,, | Performed by: FAMILY MEDICINE

## 2023-03-20 PROCEDURE — 3008F PR BODY MASS INDEX (BMI) DOCUMENTED: ICD-10-PCS | Mod: ,,, | Performed by: FAMILY MEDICINE

## 2023-03-20 PROCEDURE — 77067 SCR MAMMO BI INCL CAD: CPT | Mod: TC

## 2023-03-20 PROCEDURE — 83550 IRON AND TIBC: ICD-10-PCS | Mod: ,,, | Performed by: CLINICAL MEDICAL LABORATORY

## 2023-03-20 PROCEDURE — 3077F PR MOST RECENT SYSTOLIC BLOOD PRESSURE >= 140 MM HG: ICD-10-PCS | Mod: ,,, | Performed by: FAMILY MEDICINE

## 2023-03-20 PROCEDURE — 1101F PT FALLS ASSESS-DOCD LE1/YR: CPT | Mod: ,,, | Performed by: FAMILY MEDICINE

## 2023-03-20 PROCEDURE — 4010F ACE/ARB THERAPY RXD/TAKEN: CPT | Mod: ,,, | Performed by: FAMILY MEDICINE

## 2023-03-20 PROCEDURE — 3044F HG A1C LEVEL LT 7.0%: CPT | Mod: ,,, | Performed by: FAMILY MEDICINE

## 2023-03-20 NOTE — LETTER
March 20, 2023      [facility]  [address]  [place] [address]  Phone: [phone]  Fax: [phone]       Patient: Ene Martin   YOB: 1955  Date of Visit: 03/20/2023    To Whom It May Concern:    Ene Martin was at [facility] on 03/20/2023. The patient may return to work/school on 03/21/2023 with no restrictions. If you have any questions or concerns, or if I can be of further assistance, please do not hesitate to contact me.    Sincerely,    Dagoberto Ravi, DO

## 2023-03-20 NOTE — PROGRESS NOTES
Dagoberto Ravi DO   [address]  [place]  [address]      PATIENT NAME: Ene Martin  : 1955  DATE: 3/20/23  MRN: [id number]      Billing Provider: Dagoberto Ravi DO  Level of Service:   Patient PCP Information       Provider PCP Type    Dagoberto Ravi DO General            Reason for Visit / Chief Complaint: Follow-up       Update PCP  Update Chief Complaint         History of Present Illness / Problem Focused Workflow     Ene Martin presents to the clinic with Follow-up     Patient is in for follow-up after a GI bleed.  She states she is much better now.  She does continue to drink at home even though we advised her not to.  She does have low platelet counts and sees Hematology.  She states she feels better now on has less fatigue and weakness.  No more black tarry stools.      Review of Systems     Review of Systems   Constitutional:  Negative for activity change, appetite change, chills, fatigue and fever.   HENT:  Negative for nasal congestion, ear discharge, ear pain, mouth dryness, mouth sores, postnasal drip, sinus pressure/congestion, sore throat and voice change.    Eyes:  Negative for pain, discharge, redness, itching and visual disturbance.   Respiratory:  Positive for chest tightness and shortness of breath. Negative for apnea, cough and wheezing.    Cardiovascular:  Negative for chest pain, palpitations and leg swelling.   Gastrointestinal:  Negative for abdominal distention, abdominal pain, anal bleeding, blood in stool, change in bowel habit, constipation, diarrhea, nausea, vomiting, reflux and change in bowel habit.   Endocrine: Negative for cold intolerance, heat intolerance, polydipsia, polyphagia and polyuria.   Genitourinary:  Negative for difficulty urinating, enuresis, frequency, genital sores, hematuria, hot flashes, menstrual irregularity, urgency and vaginal dryness.   Musculoskeletal:  Negative for arthralgias, back pain, gait problem,  leg pain, myalgias and neck pain.   Integumentary:  Negative for rash, mole/lesion, breast mass, breast discharge and breast tenderness.   Allergic/Immunologic: Negative for environmental allergies and food allergies.   Neurological:  Negative for dizziness, vertigo, tremors, seizures, syncope, facial asymmetry, speech difficulty, weakness, light-headedness, numbness, headaches, coordination difficulties, memory loss and coordination difficulties.   Hematological:  Negative for adenopathy. Does not bruise/bleed easily.   Psychiatric/Behavioral:  Negative for agitation, behavioral problems, confusion, decreased concentration, dysphoric mood, hallucinations, self-injury, sleep disturbance and suicidal ideas. The patient is not nervous/anxious and is not hyperactive.    Breast: Negative for mass and tenderness    Medical / Social / Family History     Past Medical History:   Diagnosis Date    Anxiety and depression     Cataract     Encounter for blood transfusion 2023    [facility]    ETOH abuse 10/24/2022    GERD (gastroesophageal reflux disease)     History of alcohol abuse     Hyperlipidemia     Hypertension     Hypothyroidism     Idiopathic thrombocytopenic purpura     Insomnia 2022    Iron deficiency anemia 2023    Left bundle branch block     Osteoporosis     Pernicious anemia        Past Surgical History:   Procedure Laterality Date    CATARACT EXTRACTION Bilateral      SECTION      COLONOSCOPY  2023    diagnostic to due blood loss-Dr. Tran-repeat in 1 year    ESOPHAGOGASTRODUODENOSCOPY  2023    HYSTERECTOMY      OOPHORECTOMY      THYROIDECTOMY         Social History  Ms.  reports that she has been smoking vaping with nicotine and cigarettes. She started smoking about 51 years ago. She has been smoking an average of .5 packs per day. She has never used smokeless tobacco. She reports current alcohol use. She reports that she does not use drugs.    Family  History  Ms.'s family history includes Lung cancer in her father; Lupus in her daughter; No Known Problems in her brother, maternal grandfather, maternal grandmother, paternal grandfather, paternal grandmother, sister, and son; Osteoporosis in her mother.    Medications and Allergies     Medications  Outpatient Medications Marked as Taking for the 3/20/23 encounter (Office Visit) with Dagoberto Ravi, DO   Medication Sig Dispense Refill    BYSTOLIC 5 mg Tab Take 5 mg by mouth once daily.      calcium carbonate-vitamin D3 500 mg-3.125 mcg (125 unit) per tablet Take 1 tablet by mouth once daily.      cholecalciferol, vitamin D3, 125 mcg (5,000 unit) Tab Take 5,000 Units by mouth once daily.      ENTRESTO 24-26 mg per tablet Take 1 tablet by mouth 2 (two) times daily.      ferrous sulfate (FEOSOL) 325 mg (65 mg iron) Tab tablet Take 325 mg by mouth 2 (two) times daily.      TRINTELLIX 10 mg Tab TAKE 1 TABLET BY MOUTH ONCE DAILY 30 tablet 1    [DISCONTINUED] furosemide (LASIX) 20 MG tablet Take 40 mg by mouth once daily.      [DISCONTINUED] potassium chloride (KLOR-CON) 10 MEQ TbSR Take 10 mEq by mouth once daily.      [DISCONTINUED] SYNTHROID 125 mcg tablet Take 1 tablet (125 mcg total) by mouth once daily. 30 tablet 5    [DISCONTINUED] traZODone (DESYREL) 100 MG tablet Take 1 tablet (100 mg total) by mouth every evening. 30 tablet 11       Allergies  Review of patient's allergies indicates:   Allergen Reactions    Lisinopril Other (See Comments)     thrombocytopenia       Physical Examination     Vitals:    03/20/23 1529   BP: (!) 156/80   Pulse:    Resp:    Temp:      Physical Exam  Vitals reviewed.   Constitutional:       Appearance: Normal appearance. She is normal weight.   HENT:      Head: Normocephalic and atraumatic.      Nose: Nose normal.      Mouth/Throat:      Mouth: Mucous membranes are moist.      Pharynx: Oropharynx is clear. No oropharyngeal exudate or posterior oropharyngeal erythema.   Eyes:       General: No scleral icterus.     Extraocular Movements: Extraocular movements intact.      Conjunctiva/sclera: Conjunctivae normal.      Pupils: Pupils are equal, round, and reactive to light.   Neck:      Vascular: No carotid bruit.   Cardiovascular:      Rate and Rhythm: Normal rate and regular rhythm.      Pulses: Normal pulses.      Heart sounds: Normal heart sounds. No murmur heard.    No gallop.   Pulmonary:      Effort: Pulmonary effort is normal.      Breath sounds: Normal breath sounds. No wheezing.   Abdominal:      General: Abdomen is flat. Bowel sounds are normal.      Palpations: Abdomen is soft.      Tenderness: There is no abdominal tenderness.   Musculoskeletal:         General: Normal range of motion.      Cervical back: Normal range of motion and neck supple.      Right lower leg: No edema.      Left lower leg: No edema.   Lymphadenopathy:      Cervical: No cervical adenopathy.   Skin:     General: Skin is warm and dry.      Capillary Refill: Capillary refill takes less than 2 seconds.      Coloration: Skin is not jaundiced.      Findings: No lesion or rash.   Neurological:      General: No focal deficit present.      Mental Status: She is alert and oriented to person, place, and time. Mental status is at baseline.      Cranial Nerves: No cranial nerve deficit.      Sensory: No sensory deficit.      Motor: No weakness.      Coordination: Coordination normal.      Gait: Gait normal.      Deep Tendon Reflexes: Reflexes normal.   Psychiatric:         Mood and Affect: Mood normal.         Behavior: Behavior normal.         Thought Content: Thought content normal.         Judgment: Judgment normal.             Lab Results   Component Value Date    WBC 5.99 04/10/2023    HGB 8.3 (L) 04/10/2023    HCT 28.5 (L) 04/10/2023    MCV 89.3 04/10/2023    PLT 41 (L) 04/10/2023          Sodium   Date Value Ref Range Status   03/20/2023 137 136 - 145 mmol/L Final     Potassium   Date Value Ref Range Status    03/20/2023 4.0 3.5 - 5.1 mmol/L Final     Chloride   Date Value Ref Range Status   03/20/2023 103 98 - 107 mmol/L Final     CO2   Date Value Ref Range Status   03/20/2023 28 21 - 32 mmol/L Final     Glucose   Date Value Ref Range Status   03/20/2023 98 74 - 106 mg/dL Final     BUN   Date Value Ref Range Status   03/20/2023 14 7 - 18 mg/dL Final     Creatinine   Date Value Ref Range Status   03/20/2023 0.97 0.55 - 1.02 mg/dL Final     Calcium   Date Value Ref Range Status   03/20/2023 8.6 8.5 - 10.1 mg/dL Final     Total Protein   Date Value Ref Range Status   03/20/2023 7.6 6.4 - 8.2 g/dL Final     Albumin   Date Value Ref Range Status   03/20/2023 3.8 3.5 - 5.0 g/dL Final     Bilirubin, Total   Date Value Ref Range Status   03/20/2023 0.4 >0.0 - 1.2 mg/dL Final     Alk Phos   Date Value Ref Range Status   03/20/2023 93 55 - 142 U/L Final     AST   Date Value Ref Range Status   03/20/2023 36 15 - 37 U/L Final     ALT   Date Value Ref Range Status   03/20/2023 51 13 - 56 U/L Final     Anion Gap   Date Value Ref Range Status   03/20/2023 10 7 - 16 mmol/L Final     eGFR   Date Value Ref Range Status   07/18/2022 62 >=60 mL/min/1.73m² Final      Mammo Digital Screening Bilat  Narrative: Result:   Mammo Digital Screening Bilat     History:  Patient is 67 y.o. and is seen for a screening mammogram.    Films Compared:  3/16/2015     Findings:     The breasts have scattered areas of fibroglandular density. There is no   evidence of suspicious masses, calcifications, or other abnormal findings.  Impression: Bilateral  There is no mammographic evidence of malignancy.    BI-RADS Category:   Overall: 1 - Negative       Recommendation:  Routine screening mammogram in 1 year is recommended.    Your estimated lifetime risk of breast cancer (to age 85) based on   Tyrer-Cuzick risk assessment model is Tyrer-Cuzick: 3.92 %. According to   the [employer] Society, patients with a lifetime breast cancer risk   of 20% or higher  might benefit from supplemental screening tests.     Procedures   Lab Results   Component Value Date    CHOL 131 02/12/2023     Lab Results   Component Value Date    HDL 38 (L) 02/12/2023     Lab Results   Component Value Date    LDLCALC 70 02/12/2023     Lab Results   Component Value Date    TRIG 117 02/12/2023     Lab Results   Component Value Date    CHOLHDL 3.4 02/12/2023      Lab Results   Component Value Date    HGBA1C 5.5 02/20/2023      Lab Results   Component Value Date    TSH 2.710 03/20/2023    F9FNICB 11.7 03/20/2023      Assessment and Plan (including Health Maintenance)      Problem List  Smart Sets  Document Outside HM   :    Plan:         Health Maintenance Due   Topic Date Due    DEXA Scan  Never done    Influenza Vaccine (1) Never done       Problem List Items Addressed This Visit          Psychiatric    Anxiety and depression - Primary    Current Assessment & Plan     Patient's depression is improved on Trintellix.  Continue her current dose is 10 milligrams daily.  Continue trazodone at HS.  It is follow-up in 3 months           ETOH abuse    Current Assessment & Plan     Continued ETOH use and abuse.  Recommend that she stop drinking.  Follow-up in 1 month.           Relevant Orders    Gamma GT (Completed)       Cardiac/Vascular    Hypertension    Current Assessment & Plan     Control the diastolic pressure but the systolic pressures a little bit higher than would like.  Blood like to see it in the 120 range.  Continue her current medicines.  Follow back here in 1 month.           Relevant Orders    Comprehensive Metabolic Panel (Completed)    Left bundle branch block    Current Assessment & Plan     EKG shows a left bundle branch block with is not changed.  Chest pain she has had recently was likely related to her anemia.  She is having no chest pain today and there is no changes on her EKG.              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  Most recent hemoglobin 10.6 and platelets [id number].  She follows up with Hematology.              Endocrine    Hypothyroidism    Relevant Orders    TSH (Completed)    T4 (Completed)       GI    UGIB (upper gastrointestinal bleed)    Current Assessment & Plan     Recent upper GI bleed with the transfusion.  Hemoglobin now stable.  Will recheck labs today.  Follow-up in 1 month              Health Maintenance Topics with due status: Not Due       Topic Last Completion Date    Lipid Panel 02/12/2023    Colorectal Cancer Screening 02/13/2023    Hemoglobin A1c (Diabetic Prevention Screening) 02/20/2023    Mammogram 03/20/2023       Future Appointments   Date Time Provider Department Center   4/26/2023  3:00 PM Papa Melton DO [facility] SLEEP [facility]   5/22/2023  1:00 PM Savita Graff MD [facility]   6/21/2023  3:30 PM Dagoberto Ravi DO [facility] [place]   3/25/2024 12:45 PM [id number]        Follow up in about 4 weeks (around 4/17/2023).     Signature:  DO [facility]  [address] [place] [address], [place]  [address]    Date of encounter: 3/20/23

## 2023-04-06 ENCOUNTER — OFFICE VISIT (OUTPATIENT)
Dept: FAMILY MEDICINE | Facility: CLINIC | Age: 68
End: 2023-04-06
Payer: COMMERCIAL

## 2023-04-06 VITALS
TEMPERATURE: 98 F | OXYGEN SATURATION: 96 % | RESPIRATION RATE: 18 BRPM | SYSTOLIC BLOOD PRESSURE: 142 MMHG | BODY MASS INDEX: 32.47 KG/M2 | DIASTOLIC BLOOD PRESSURE: 77 MMHG | HEART RATE: 88 BPM | WEIGHT: 190.19 LBS | HEIGHT: 64 IN

## 2023-04-06 DIAGNOSIS — E78.2 MIXED HYPERLIPIDEMIA: ICD-10-CM

## 2023-04-06 DIAGNOSIS — I10 HYPERTENSION, UNSPECIFIED TYPE: Primary | ICD-10-CM

## 2023-04-06 DIAGNOSIS — D50.9 IRON DEFICIENCY ANEMIA, UNSPECIFIED IRON DEFICIENCY ANEMIA TYPE: ICD-10-CM

## 2023-04-06 DIAGNOSIS — E03.9 ACQUIRED HYPOTHYROIDISM: ICD-10-CM

## 2023-04-06 DIAGNOSIS — G47.00 INSOMNIA, UNSPECIFIED TYPE: ICD-10-CM

## 2023-04-06 DIAGNOSIS — R60.0 LOCALIZED EDEMA: ICD-10-CM

## 2023-04-06 DIAGNOSIS — F32.A ANXIETY AND DEPRESSION: ICD-10-CM

## 2023-04-06 DIAGNOSIS — F41.9 ANXIETY AND DEPRESSION: ICD-10-CM

## 2023-04-06 DIAGNOSIS — F32.A DEPRESSION, UNSPECIFIED DEPRESSION TYPE: ICD-10-CM

## 2023-04-06 LAB
ANISOCYTOSIS BLD QL SMEAR: ABNORMAL
BASOPHILS # BLD AUTO: 0.05 K/UL (ref 0–0.2)
BASOPHILS NFR BLD AUTO: 0.8 % (ref 0–1)
DIFFERENTIAL METHOD BLD: ABNORMAL
EOSINOPHIL # BLD AUTO: 0.16 K/UL (ref 0–0.5)
EOSINOPHIL NFR BLD AUTO: 2.6 % (ref 1–4)
ERYTHROCYTE [DISTWIDTH] IN BLOOD BY AUTOMATED COUNT: 20.2 % (ref 11.5–14.5)
HCT VFR BLD AUTO: 25.9 % (ref 38–47)
HGB BLD-MCNC: 8 G/DL (ref 12–16)
IMM GRANULOCYTES # BLD AUTO: 0.01 K/UL (ref 0–0.04)
IMM GRANULOCYTES NFR BLD: 0.2 % (ref 0–0.4)
LYMPHOCYTES # BLD AUTO: 1.6 K/UL (ref 1–4.8)
LYMPHOCYTES NFR BLD AUTO: 26.4 % (ref 27–41)
MCH RBC QN AUTO: 26.7 PG (ref 27–31)
MCHC RBC AUTO-ENTMCNC: 30.9 G/DL (ref 32–36)
MCV RBC AUTO: 86.3 FL (ref 80–96)
MONOCYTES # BLD AUTO: 0.59 K/UL (ref 0–0.8)
MONOCYTES NFR BLD AUTO: 9.8 % (ref 2–6)
MPC BLD CALC-MCNC: ABNORMAL G/DL
NEUTROPHILS # BLD AUTO: 3.64 K/UL (ref 1.8–7.7)
NEUTROPHILS NFR BLD AUTO: 60.2 % (ref 53–65)
NRBC # BLD AUTO: 0 X10E3/UL
NRBC, AUTO (.00): 0 %
PLATELET # BLD AUTO: 18 K/UL (ref 150–400)
PLATELET MORPHOLOGY: ABNORMAL
POLYCHROMASIA BLD QL SMEAR: ABNORMAL
RBC # BLD AUTO: 3 M/UL (ref 4.2–5.4)
WBC # BLD AUTO: 6.05 K/UL (ref 4.5–11)

## 2023-04-06 PROCEDURE — 1126F PR PAIN SEVERITY QUANTIFIED, NO PAIN PRESENT: ICD-10-PCS | Mod: ,,, | Performed by: FAMILY MEDICINE

## 2023-04-06 PROCEDURE — 4010F ACE/ARB THERAPY RXD/TAKEN: CPT | Mod: ,,, | Performed by: FAMILY MEDICINE

## 2023-04-06 PROCEDURE — 85025 COMPLETE CBC W/AUTO DIFF WBC: CPT | Mod: ,,, | Performed by: CLINICAL MEDICAL LABORATORY

## 2023-04-06 PROCEDURE — 3078F DIAST BP <80 MM HG: CPT | Mod: ,,, | Performed by: FAMILY MEDICINE

## 2023-04-06 PROCEDURE — 3078F PR MOST RECENT DIASTOLIC BLOOD PRESSURE < 80 MM HG: ICD-10-PCS | Mod: ,,, | Performed by: FAMILY MEDICINE

## 2023-04-06 PROCEDURE — 3008F BODY MASS INDEX DOCD: CPT | Mod: ,,, | Performed by: FAMILY MEDICINE

## 2023-04-06 PROCEDURE — 3288F PR FALLS RISK ASSESSMENT DOCUMENTED: ICD-10-PCS | Mod: ,,, | Performed by: FAMILY MEDICINE

## 2023-04-06 PROCEDURE — 99214 PR OFFICE/OUTPT VISIT, EST, LEVL IV, 30-39 MIN: ICD-10-PCS | Mod: ,,, | Performed by: FAMILY MEDICINE

## 2023-04-06 PROCEDURE — 1101F PT FALLS ASSESS-DOCD LE1/YR: CPT | Mod: ,,, | Performed by: FAMILY MEDICINE

## 2023-04-06 PROCEDURE — 3077F SYST BP >= 140 MM HG: CPT | Mod: ,,, | Performed by: FAMILY MEDICINE

## 2023-04-06 PROCEDURE — 1101F PR PT FALLS ASSESS DOC 0-1 FALLS W/OUT INJ PAST YR: ICD-10-PCS | Mod: ,,, | Performed by: FAMILY MEDICINE

## 2023-04-06 PROCEDURE — 1159F MED LIST DOCD IN RCRD: CPT | Mod: ,,, | Performed by: FAMILY MEDICINE

## 2023-04-06 PROCEDURE — 3044F HG A1C LEVEL LT 7.0%: CPT | Mod: ,,, | Performed by: FAMILY MEDICINE

## 2023-04-06 PROCEDURE — 3288F FALL RISK ASSESSMENT DOCD: CPT | Mod: ,,, | Performed by: FAMILY MEDICINE

## 2023-04-06 PROCEDURE — 3077F PR MOST RECENT SYSTOLIC BLOOD PRESSURE >= 140 MM HG: ICD-10-PCS | Mod: ,,, | Performed by: FAMILY MEDICINE

## 2023-04-06 PROCEDURE — 1126F AMNT PAIN NOTED NONE PRSNT: CPT | Mod: ,,, | Performed by: FAMILY MEDICINE

## 2023-04-06 PROCEDURE — 1159F PR MEDICATION LIST DOCUMENTED IN MEDICAL RECORD: ICD-10-PCS | Mod: ,,, | Performed by: FAMILY MEDICINE

## 2023-04-06 PROCEDURE — 4010F PR ACE/ARB THEARPY RXD/TAKEN: ICD-10-PCS | Mod: ,,, | Performed by: FAMILY MEDICINE

## 2023-04-06 PROCEDURE — 85025 CBC WITH DIFFERENTIAL: ICD-10-PCS | Mod: ,,, | Performed by: CLINICAL MEDICAL LABORATORY

## 2023-04-06 PROCEDURE — 3044F PR MOST RECENT HEMOGLOBIN A1C LEVEL <7.0%: ICD-10-PCS | Mod: ,,, | Performed by: FAMILY MEDICINE

## 2023-04-06 PROCEDURE — 3008F PR BODY MASS INDEX (BMI) DOCUMENTED: ICD-10-PCS | Mod: ,,, | Performed by: FAMILY MEDICINE

## 2023-04-06 PROCEDURE — 99214 OFFICE O/P EST MOD 30 MIN: CPT | Mod: ,,, | Performed by: FAMILY MEDICINE

## 2023-04-06 RX ORDER — POTASSIUM CHLORIDE 750 MG/1
10 TABLET, EXTENDED RELEASE ORAL DAILY
Qty: 30 TABLET | Refills: 5 | Status: ON HOLD | OUTPATIENT
Start: 2023-04-06 | End: 2023-06-23 | Stop reason: HOSPADM

## 2023-04-06 RX ORDER — TRAZODONE HYDROCHLORIDE 100 MG/1
100 TABLET ORAL NIGHTLY
Qty: 30 TABLET | Refills: 11 | Status: SHIPPED | OUTPATIENT
Start: 2023-04-06 | End: 2024-04-05

## 2023-04-06 RX ORDER — LEVOTHYROXINE SODIUM 125 UG/1
125 TABLET ORAL DAILY
Qty: 30 TABLET | Refills: 5 | Status: SHIPPED | OUTPATIENT
Start: 2023-04-06 | End: 2023-11-02 | Stop reason: SDUPTHER

## 2023-04-06 RX ORDER — FUROSEMIDE 20 MG/1
40 TABLET ORAL DAILY
Qty: 30 TABLET | Refills: 5 | Status: SHIPPED | OUTPATIENT
Start: 2023-04-06

## 2023-04-06 NOTE — ASSESSMENT & PLAN NOTE
Iron deficiency anemia that is markedly improved with hemoglobin in the 10 range.  Will repeat a CBC today.  Will schedule her for iron infusions next week.

## 2023-04-06 NOTE — PROGRESS NOTES
Dagoberto Ravi DO   [address] [place]  [address]      PATIENT NAME: Ene Maritn  : 1955  DATE: 23  MRN: [id number]      Billing Provider: Dagoberto Ravi DO  Level of Service:   Patient PCP Information       Provider PCP Type    Dagoberto Ravi DO General            Reason for Visit / Chief Complaint: Follow-up and Anemia (Here to discus iron anemia and iron infusions.)       Update PCP  Update Chief Complaint         History of Present Illness / Problem Focused Workflow     Ene Martin presents to the clinic with Follow-up and Anemia (Here to discus iron anemia and iron infusions.)     Patient is in for follow-up on her anemia.  It is much improved.  She is unable to tolerate oral iron at as a causes her abdominal pain and cramping.  She denies any blood in her stools or black tarry stools.  She has had no chest pain shortness of breath palpitations PND orthopnea.  Her edema is improved.  Her depression is markedly improved.  She does have a  that is severely disabled at home that she is having to take care of also.      Review of Systems     Review of Systems   Constitutional:  Positive for activity change, appetite change and fatigue. Negative for chills and fever.   HENT:  Negative for nasal congestion, ear discharge, ear pain, mouth dryness, mouth sores, postnasal drip, sinus pressure/congestion, sore throat and voice change.    Eyes:  Negative for pain, discharge, redness, itching and visual disturbance.   Respiratory:  Positive for shortness of breath. Negative for apnea, cough, chest tightness and wheezing.    Cardiovascular:  Negative for chest pain, palpitations and leg swelling.   Gastrointestinal:  Negative for abdominal distention, abdominal pain, anal bleeding, blood in stool, change in bowel habit, constipation, diarrhea, nausea, vomiting, reflux and change in bowel habit.   Endocrine: Negative for cold intolerance, heat intolerance,  polydipsia, polyphagia and polyuria.   Genitourinary:  Negative for difficulty urinating, enuresis, frequency, genital sores, hematuria, hot flashes, menstrual irregularity, urgency and vaginal dryness.   Musculoskeletal:  Negative for arthralgias, back pain, gait problem, leg pain, myalgias and neck pain.   Integumentary:  Negative for rash, mole/lesion, breast mass, breast discharge and breast tenderness.   Allergic/Immunologic: Negative for environmental allergies and food allergies.   Neurological:  Positive for weakness. Negative for dizziness, vertigo, tremors, seizures, syncope, facial asymmetry, speech difficulty, light-headedness, numbness, headaches, coordination difficulties, memory loss and coordination difficulties.   Hematological:  Negative for adenopathy. Does not bruise/bleed easily.   Psychiatric/Behavioral:  Negative for agitation, behavioral problems, confusion, decreased concentration, dysphoric mood, hallucinations, self-injury, sleep disturbance and suicidal ideas. The patient is not nervous/anxious and is not hyperactive.    Breast: Negative for mass and tenderness    Medical / Social / Family History     Past Medical History:   Diagnosis Date    Anxiety and depression     Cataract     Encounter for blood transfusion 2023    [facility]    ETOH abuse 10/24/2022    GERD (gastroesophageal reflux disease)     History of alcohol abuse     Hyperlipidemia     Hypertension     Hypothyroidism     Idiopathic thrombocytopenic purpura     Insomnia 2022    Iron deficiency anemia 2023    Left bundle branch block     Osteoporosis     Pernicious anemia        Past Surgical History:   Procedure Laterality Date    CATARACT EXTRACTION Bilateral      SECTION      COLONOSCOPY  2023    diagnostic to due blood loss-Dr. Tran-repeat in 1 year    ESOPHAGOGASTRODUODENOSCOPY  2023    HYSTERECTOMY      OOPHORECTOMY      THYROIDECTOMY         Social History  Ms.   reports that she has been smoking vaping with nicotine and cigarettes. She started smoking about 51 years ago. She has been smoking an average of .5 packs per day. She has never used smokeless tobacco. She reports current alcohol use. She reports that she does not use drugs.    Family History  Ms.'s family history includes Lung cancer in her father; Lupus in her daughter; No Known Problems in her brother, maternal grandfather, maternal grandmother, paternal grandfather, paternal grandmother, sister, and son; Osteoporosis in her mother.    Medications and Allergies     Medications  Outpatient Medications Marked as Taking for the 4/6/23 encounter (Office Visit) with Dagoberto Ravi, DO   Medication Sig Dispense Refill    BYSTOLIC 5 mg Tab Take 5 mg by mouth once daily.      calcium carbonate-vitamin D3 500 mg-3.125 mcg (125 unit) per tablet Take 1 tablet by mouth once daily.      cholecalciferol, vitamin D3, 125 mcg (5,000 unit) Tab Take 5,000 Units by mouth once daily.      ENTRESTO 24-26 mg per tablet Take 1 tablet by mouth 2 (two) times daily.      pantoprazole (PROTONIX) 40 MG tablet Take 1 tablet (40 mg total) by mouth 2 (two) times daily. for 14 days 28 tablet 0    TRINTELLIX 10 mg Tab TAKE 1 TABLET BY MOUTH ONCE DAILY 30 tablet 1    [DISCONTINUED] furosemide (LASIX) 20 MG tablet Take 40 mg by mouth once daily.      [DISCONTINUED] potassium chloride (KLOR-CON) 10 MEQ TbSR Take 10 mEq by mouth once daily.      [DISCONTINUED] SYNTHROID 125 mcg tablet Take 1 tablet (125 mcg total) by mouth once daily. 30 tablet 5    [DISCONTINUED] traZODone (DESYREL) 100 MG tablet Take 1 tablet (100 mg total) by mouth every evening. 30 tablet 11       Allergies  Review of patient's allergies indicates:   Allergen Reactions    Lisinopril Other (See Comments)     thrombocytopenia       Physical Examination     Vitals:    04/06/23 1507   BP: (!) 142/77   Pulse: 88   Resp: 18   Temp: 97.8 °F (36.6 °C)     Physical Exam  Vitals  reviewed.   Constitutional:       Appearance: Normal appearance. She is obese.   HENT:      Head: Normocephalic and atraumatic.      Nose: Nose normal.      Mouth/Throat:      Mouth: Mucous membranes are moist.      Pharynx: Oropharynx is clear. No oropharyngeal exudate or posterior oropharyngeal erythema.   Eyes:      General: No scleral icterus.     Extraocular Movements: Extraocular movements intact.      Conjunctiva/sclera: Conjunctivae normal.      Pupils: Pupils are equal, round, and reactive to light.   Neck:      Vascular: No carotid bruit.   Cardiovascular:      Rate and Rhythm: Normal rate and regular rhythm.      Pulses: Normal pulses.      Heart sounds: Normal heart sounds. No murmur heard.    No gallop.   Pulmonary:      Effort: Pulmonary effort is normal.      Breath sounds: Normal breath sounds. No wheezing.   Abdominal:      General: Abdomen is flat. Bowel sounds are normal.      Palpations: Abdomen is soft.      Tenderness: There is no abdominal tenderness.   Musculoskeletal:         General: Normal range of motion.      Cervical back: Normal range of motion and neck supple.      Right lower leg: No edema.      Left lower leg: No edema.   Lymphadenopathy:      Cervical: No cervical adenopathy.   Skin:     General: Skin is warm and dry.      Capillary Refill: Capillary refill takes less than 2 seconds.      Coloration: Skin is pale. Skin is not jaundiced.      Findings: No lesion or rash.   Neurological:      General: No focal deficit present.      Mental Status: She is alert and oriented to person, place, and time. Mental status is at baseline.      Cranial Nerves: No cranial nerve deficit.      Sensory: No sensory deficit.      Motor: No weakness.      Coordination: Coordination normal.      Gait: Gait normal.      Deep Tendon Reflexes: Reflexes normal.   Psychiatric:         Mood and Affect: Mood normal.         Behavior: Behavior normal.         Thought Content: Thought content normal.          Judgment: Judgment normal.             Lab Results   Component Value Date    WBC 6.83 03/20/2023    HGB 10.6 (L) 03/20/2023    HCT 33.5 (L) 03/20/2023    MCV 83.5 03/20/2023    PLT 38 (L) 03/20/2023          Sodium   Date Value Ref Range Status   03/20/2023 137 136 - 145 mmol/L Final     Potassium   Date Value Ref Range Status   03/20/2023 4.0 3.5 - 5.1 mmol/L Final     Chloride   Date Value Ref Range Status   03/20/2023 103 98 - 107 mmol/L Final     CO2   Date Value Ref Range Status   03/20/2023 28 21 - 32 mmol/L Final     Glucose   Date Value Ref Range Status   03/20/2023 98 74 - 106 mg/dL Final     BUN   Date Value Ref Range Status   03/20/2023 14 7 - 18 mg/dL Final     Creatinine   Date Value Ref Range Status   03/20/2023 0.97 0.55 - 1.02 mg/dL Final     Calcium   Date Value Ref Range Status   03/20/2023 8.6 8.5 - 10.1 mg/dL Final     Total Protein   Date Value Ref Range Status   03/20/2023 7.6 6.4 - 8.2 g/dL Final     Albumin   Date Value Ref Range Status   03/20/2023 3.8 3.5 - 5.0 g/dL Final     Bilirubin, Total   Date Value Ref Range Status   03/20/2023 0.4 >0.0 - 1.2 mg/dL Final     Alk Phos   Date Value Ref Range Status   03/20/2023 93 55 - 142 U/L Final     AST   Date Value Ref Range Status   03/20/2023 36 15 - 37 U/L Final     ALT   Date Value Ref Range Status   03/20/2023 51 13 - 56 U/L Final     Anion Gap   Date Value Ref Range Status   03/20/2023 10 7 - 16 mmol/L Final     eGFR   Date Value Ref Range Status   07/18/2022 62 >=60 mL/min/1.73m² Final      Mammo Digital Screening Bilat  Narrative: Result:   Mammo Digital Screening Bilat     History:  Patient is 67 y.o. and is seen for a screening mammogram.    Films Compared:  3/16/2015     Findings:     The breasts have scattered areas of fibroglandular density. There is no   evidence of suspicious masses, calcifications, or other abnormal findings.  Impression: Bilateral  There is no mammographic evidence of malignancy.    BI-RADS Category:   Overall: 1  - Negative       Recommendation:  Routine screening mammogram in 1 year is recommended.    Your estimated lifetime risk of breast cancer (to age 85) based on   Tyrer-Cuzick risk assessment model is Tyrer-Cuzick: 3.92 %. According to   the American Cancer Society, patients with a lifetime breast cancer risk   of 20% or higher might benefit from supplemental screening tests.     Procedures   Lab Results   Component Value Date    CHOL 131 02/12/2023     Lab Results   Component Value Date    HDL 38 (L) 02/12/2023     Lab Results   Component Value Date    LDLCALC 70 02/12/2023     Lab Results   Component Value Date    TRIG 117 02/12/2023     Lab Results   Component Value Date    CHOLHDL 3.4 02/12/2023      Lab Results   Component Value Date    HGBA1C 5.5 02/20/2023      Lab Results   Component Value Date    TSH 2.710 03/20/2023    D1DRXYW 11.7 03/20/2023      Assessment and Plan (including Health Maintenance)      Problem List  Smart Sets  Document Outside HM   :    Plan:         Health Maintenance Due   Topic Date Due    DEXA Scan  Never done    Influenza Vaccine (1) Never done       Problem List Items Addressed This Visit          Psychiatric    Anxiety and depression    Current Assessment & Plan     Depression markedly improved however patient has a disabled  at home who requires a lot of her time and effort.  She also also had a fairly severe anemia that is improving.  No change in her medicines.  Follow-up in 3 months.           Relevant Medications    traZODone (DESYREL) 100 MG tablet       Cardiac/Vascular    Hypertension - Primary    Current Assessment & Plan     Blood pressure control but systolic pressure is not ideal.  Goal for her is a blood pressure 120/70.  Follow-up in 3 months.           Hyperlipidemia    Current Assessment & Plan     No recent lipid panel.  Will need to draw lipid at her next visit.  Follow-up in 1 month.              Oncology    Iron deficiency anemia    Current Assessment & Plan      Iron deficiency anemia that is markedly improved with hemoglobin in the 10 range.  Will repeat a CBC today.  Will schedule her for iron infusions next week.           Relevant Orders    CBC Auto Differential       Endocrine    Hypothyroidism    Current Assessment & Plan     Clinically euthyroid.  Last TSH was within normal limits.  No change in medication.  Follow-up in 6 months.           Relevant Medications    SYNTHROID 125 mcg tablet       Other    Insomnia    Current Assessment & Plan     Trazodone 100 mg at HS for sleep.  Sleep hygiene discussed.  Dark quite cool room.           Relevant Medications    traZODone (DESYREL) 100 MG tablet    Localized edema    Current Assessment & Plan     Local extremity edema is improved with Lasix.  Continue current dose at 40 mg.  Follow-up p.r.n.           Relevant Medications    potassium chloride (KLOR-CON) 10 MEQ TbSR    furosemide (LASIX) 20 MG tablet     Other Visit Diagnoses       Depression, unspecified depression type        Relevant Medications    traZODone (DESYREL) 100 MG tablet            Health Maintenance Topics with due status: Not Due       Topic Last Completion Date    Lipid Panel 02/12/2023    Colorectal Cancer Screening 02/13/2023    Hemoglobin A1c (Diabetic Prevention Screening) 02/20/2023    Mammogram 03/20/2023       Future Appointments   Date Time Provider Department Center   4/26/2023  3:00 PM Papa Melton DO [facility] SLEEP [place]   5/22/2023  1:00 PM Savita Graff MD [facility] CARD [facility]   6/21/2023  3:30 PM Dagoberto Ravi DO [facility] [place]   3/25/2024 12:45 PM [facility]        Follow up in about 4 weeks (around 5/4/2023).     Signature:  Dagoberto Ravi DO  [address] [place]  [address]    Date of encounter: 4/6/23

## 2023-04-06 NOTE — ASSESSMENT & PLAN NOTE
Blood pressure control but systolic pressure is not ideal.  Goal for her is a blood pressure 120/70.  Follow-up in 3 months.

## 2023-04-06 NOTE — ASSESSMENT & PLAN NOTE
Depression markedly improved however patient has a disabled  at home who requires a lot of her time and effort.  She also also had a fairly severe anemia that is improving.  No change in her medicines.  Follow-up in 3 months.

## 2023-04-10 ENCOUNTER — TELEPHONE (OUTPATIENT)
Dept: FAMILY MEDICINE | Facility: CLINIC | Age: 68
End: 2023-04-10
Payer: COMMERCIAL

## 2023-04-10 DIAGNOSIS — D69.6 THROMBOCYTOPENIA: Primary | ICD-10-CM

## 2023-04-10 NOTE — TELEPHONE ENCOUNTER
Patient notified to return to the clinic to have CBC repeated today. Patient voiced understanding.

## 2023-04-13 NOTE — ASSESSMENT & PLAN NOTE
Recent upper GI bleed with the transfusion.  Hemoglobin now stable.  Will recheck labs today.  Follow-up in 1 month

## 2023-04-13 NOTE — ASSESSMENT & PLAN NOTE
Control the diastolic pressure but the systolic pressures a little bit higher than would like.  Blood like to see it in the 120 range.  Continue her current medicines.  Follow back here in 1 month.

## 2023-04-13 NOTE — ASSESSMENT & PLAN NOTE
EKG shows a left bundle branch block with is not changed.  Chest pain she has had recently was likely related to her anemia.  She is having no chest pain today and there is no changes on her EKG.

## 2023-04-13 NOTE — ASSESSMENT & PLAN NOTE
History of ITP with the recent upper GI bleed.  Most recent hemoglobin 10.6 and platelets [id number].  She follows up with Hematology.

## 2023-04-13 NOTE — ASSESSMENT & PLAN NOTE
Patient's depression is improved on Trintellix.  Continue her current dose is 10 milligrams daily.  Continue trazodone at HS.  It is follow-up in 3 months

## 2023-04-21 ENCOUNTER — TELEPHONE (OUTPATIENT)
Dept: FAMILY MEDICINE | Facility: CLINIC | Age: 68
End: 2023-04-21
Payer: COMMERCIAL

## 2023-04-21 DIAGNOSIS — D69.6 THROMBOCYTOPENIA: Primary | ICD-10-CM

## 2023-04-21 NOTE — TELEPHONE ENCOUNTER
Patient called checking on her lab results.Notified patient that labs had slightly improved. Denies any bleeding or any other problems. Dr. Ravi asked that patient return to clinic in 1 week for repeat CBC.

## 2023-04-27 ENCOUNTER — TELEPHONE (OUTPATIENT)
Dept: FAMILY MEDICINE | Facility: CLINIC | Age: 68
End: 2023-04-27
Payer: COMMERCIAL

## 2023-04-27 NOTE — TELEPHONE ENCOUNTER
Office visit note from 04/06/2023 states that the Pt will be set up for iron infusions but no order was put in.

## 2023-04-28 NOTE — TELEPHONE ENCOUNTER
"Patient notified that lab results were abnormal. Dr. Ravi stated patient needs to be evaluated at the ER since she is having symptoms of near syncope. Patient reported that she does not feel well and that she has felt "like she was going to pass out all day". Patient instructed to go to ER again. She asked if they were "going to keep" her. I explained to patient that that would be up to ER physician. Voiced understanding.  "

## 2023-04-28 NOTE — TELEPHONE ENCOUNTER
----- Message from Lucero Reynoso MA sent at 4/28/2023  8:55 AM CDT -----  Regarding: Lab Results   would like a return call detailing her lab results from tests on 4/27/2023. She stated that she is seriously not feeling well and to please let her know something as soon as possible. The contact number she would like to be used is [phone].

## 2023-04-29 ENCOUNTER — HOSPITAL ENCOUNTER (EMERGENCY)
Facility: HOSPITAL | Age: 68
Discharge: HOME | End: 2023-04-29
Payer: COMMERCIAL

## 2023-04-29 VITALS
OXYGEN SATURATION: 97 % | DIASTOLIC BLOOD PRESSURE: 71 MMHG | TEMPERATURE: 98 F | BODY MASS INDEX: 32.95 KG/M2 | WEIGHT: 193 LBS | RESPIRATION RATE: 14 BRPM | HEIGHT: 64 IN | SYSTOLIC BLOOD PRESSURE: 170 MMHG | HEART RATE: 69 BPM

## 2023-04-29 DIAGNOSIS — F32.A DEPRESSION, UNSPECIFIED DEPRESSION TYPE: ICD-10-CM

## 2023-04-29 DIAGNOSIS — D64.9 SYMPTOMATIC ANEMIA: Primary | ICD-10-CM

## 2023-04-29 LAB
ABO + RH BLD: NORMAL
ABO + RH BLD: NORMAL
ALBUMIN SERPL BCP-MCNC: 3.2 G/DL (ref 3.5–5)
ALBUMIN/GLOB SERPL: 0.9 {RATIO}
ALP SERPL-CCNC: 82 U/L (ref 55–142)
ALT SERPL W P-5'-P-CCNC: 66 U/L (ref 13–56)
ANION GAP SERPL CALCULATED.3IONS-SCNC: 11 MMOL/L (ref 7–16)
APTT PPP: 31.9 SECONDS (ref 25.2–37.3)
AST SERPL W P-5'-P-CCNC: 47 U/L (ref 15–37)
BASOPHILS # BLD AUTO: 0.06 K/UL (ref 0–0.2)
BASOPHILS NFR BLD AUTO: 0.9 % (ref 0–1)
BILIRUB SERPL-MCNC: 0.3 MG/DL (ref ?–1.2)
BLD PROD TYP BPU: NORMAL
BLD PROD TYP BPU: NORMAL
BLOOD UNIT EXPIRATION DATE: NORMAL
BLOOD UNIT EXPIRATION DATE: NORMAL
BLOOD UNIT TYPE CODE: 5100
BLOOD UNIT TYPE CODE: 5100
BUN SERPL-MCNC: 10 MG/DL (ref 7–18)
BUN/CREAT SERPL: 10 (ref 6–20)
CALCIUM SERPL-MCNC: 8.3 MG/DL (ref 8.5–10.1)
CHLORIDE SERPL-SCNC: 103 MMOL/L (ref 98–107)
CO2 SERPL-SCNC: 30 MMOL/L (ref 21–32)
CREAT SERPL-MCNC: 1.05 MG/DL (ref 0.55–1.02)
CROSSMATCH INTERPRETATION: NORMAL
CROSSMATCH INTERPRETATION: NORMAL
DIFFERENTIAL METHOD BLD: ABNORMAL
DISPENSE STATUS: NORMAL
DISPENSE STATUS: NORMAL
EGFR (NO RACE VARIABLE) (RUSH/TITUS): 58 ML/MIN/1.73M2
EOSINOPHIL # BLD AUTO: 0.19 K/UL (ref 0–0.5)
EOSINOPHIL NFR BLD AUTO: 2.8 % (ref 1–4)
ERYTHROCYTE [DISTWIDTH] IN BLOOD BY AUTOMATED COUNT: 20.8 % (ref 11.5–14.5)
GLOBULIN SER-MCNC: 3.7 G/DL (ref 2–4)
GLUCOSE SERPL-MCNC: 109 MG/DL (ref 74–106)
HCT VFR BLD AUTO: 20.4 % (ref 38–47)
HEMOCCULT STL QL: NEGATIVE
HGB BLD-MCNC: 5.8 G/DL (ref 12–16)
IMM GRANULOCYTES # BLD AUTO: 0.02 K/UL (ref 0–0.04)
IMM GRANULOCYTES NFR BLD: 0.3 % (ref 0–0.4)
INDIRECT COOMBS: NORMAL
INR BLD: 1.17
LYMPHOCYTES # BLD AUTO: 1.81 K/UL (ref 1–4.8)
LYMPHOCYTES NFR BLD AUTO: 26.9 % (ref 27–41)
MCH RBC QN AUTO: 22.7 PG (ref 27–31)
MCHC RBC AUTO-ENTMCNC: 28.4 G/DL (ref 32–36)
MCV RBC AUTO: 79.7 FL (ref 80–96)
MONOCYTES # BLD AUTO: 0.82 K/UL (ref 0–0.8)
MONOCYTES NFR BLD AUTO: 12.2 % (ref 2–6)
MPC BLD CALC-MCNC: ABNORMAL G/DL
NEUTROPHILS # BLD AUTO: 3.83 K/UL (ref 1.8–7.7)
NEUTROPHILS NFR BLD AUTO: 56.9 % (ref 53–65)
NRBC # BLD AUTO: 0.02 X10E3/UL
NRBC, AUTO (.00): 0.3 %
PLATELET # BLD AUTO: 113 K/UL (ref 150–400)
POTASSIUM SERPL-SCNC: 4.1 MMOL/L (ref 3.5–5.1)
PROT SERPL-MCNC: 6.9 G/DL (ref 6.4–8.2)
PROTHROMBIN TIME: 14.4 SECONDS (ref 11.7–14.7)
RBC # BLD AUTO: 2.56 M/UL (ref 4.2–5.4)
RH BLD: NORMAL
SODIUM SERPL-SCNC: 140 MMOL/L (ref 136–145)
SPECIMEN OUTDATE: NORMAL
UNIT NUMBER: NORMAL
UNIT NUMBER: NORMAL
WBC # BLD AUTO: 6.73 K/UL (ref 4.5–11)

## 2023-04-29 PROCEDURE — 99284 EMERGENCY DEPT VISIT MOD MDM: CPT | Mod: ,,, | Performed by: NURSE PRACTITIONER

## 2023-04-29 PROCEDURE — 93010 EKG 12-LEAD: ICD-10-PCS | Mod: ,,, | Performed by: INTERNAL MEDICINE

## 2023-04-29 PROCEDURE — 86900 BLOOD TYPING SEROLOGIC ABO: CPT | Performed by: NURSE PRACTITIONER

## 2023-04-29 PROCEDURE — 93005 ELECTROCARDIOGRAM TRACING: CPT

## 2023-04-29 PROCEDURE — 82272 OCCULT BLD FECES 1-3 TESTS: CPT

## 2023-04-29 PROCEDURE — 25000003 PHARM REV CODE 250: Performed by: NURSE PRACTITIONER

## 2023-04-29 PROCEDURE — 99284 PR EMERGENCY DEPT VISIT,LEVEL IV: ICD-10-PCS | Mod: ,,, | Performed by: NURSE PRACTITIONER

## 2023-04-29 PROCEDURE — 99285 EMERGENCY DEPT VISIT HI MDM: CPT | Mod: 25

## 2023-04-29 PROCEDURE — 86923 COMPATIBILITY TEST ELECTRIC: CPT | Performed by: NURSE PRACTITIONER

## 2023-04-29 PROCEDURE — 85610 PROTHROMBIN TIME: CPT | Performed by: NURSE PRACTITIONER

## 2023-04-29 PROCEDURE — 93010 ELECTROCARDIOGRAM REPORT: CPT | Mod: ,,, | Performed by: INTERNAL MEDICINE

## 2023-04-29 PROCEDURE — 80053 COMPREHEN METABOLIC PANEL: CPT | Performed by: NURSE PRACTITIONER

## 2023-04-29 PROCEDURE — 36430 TRANSFUSION BLD/BLD COMPNT: CPT

## 2023-04-29 PROCEDURE — 85025 COMPLETE CBC W/AUTO DIFF WBC: CPT | Performed by: NURSE PRACTITIONER

## 2023-04-29 PROCEDURE — P9016 RBC LEUKOCYTES REDUCED: HCPCS | Performed by: NURSE PRACTITIONER

## 2023-04-29 PROCEDURE — 85730 THROMBOPLASTIN TIME PARTIAL: CPT | Performed by: NURSE PRACTITIONER

## 2023-04-29 RX ORDER — HYDROCODONE BITARTRATE AND ACETAMINOPHEN 500; 5 MG/1; MG/1
TABLET ORAL
Status: DISCONTINUED | OUTPATIENT
Start: 2023-04-29 | End: 2023-04-29 | Stop reason: HOSPADM

## 2023-04-29 RX ADMIN — SODIUM CHLORIDE: 9 INJECTION, SOLUTION INTRAVENOUS at 03:04

## 2023-04-29 NOTE — ED TRIAGE NOTES
PATIENT PRESENTS TO ER WITH COMPLAINT OF LOW H&H. WENT TO [facility] Thursday AND WAS TOLD WAS LOW. ATTEMPTED TO GET BLOOD AT [facility] AND THEY ARE UNABLE TO DO SO UNTIL Monday. WANTS PRBC AND DISCHARGE. DOES NOT WANT TO BE ADMITTED

## 2023-04-30 RX ORDER — VORTIOXETINE 10 MG/1
TABLET, FILM COATED ORAL
Qty: 30 TABLET | Refills: 1 | Status: SHIPPED | OUTPATIENT
Start: 2023-04-30 | End: 2023-06-15 | Stop reason: SDUPTHER

## 2023-04-30 NOTE — DISCHARGE INSTRUCTIONS
Follow up with Dr. Ravi on Monday for recheck.  Return to the ER with new or worsening symptoms.

## 2023-05-05 ENCOUNTER — OFFICE VISIT (OUTPATIENT)
Dept: FAMILY MEDICINE | Facility: CLINIC | Age: 68
End: 2023-05-05
Payer: COMMERCIAL

## 2023-05-05 VITALS
RESPIRATION RATE: 17 BRPM | TEMPERATURE: 99 F | HEART RATE: 77 BPM | DIASTOLIC BLOOD PRESSURE: 68 MMHG | SYSTOLIC BLOOD PRESSURE: 126 MMHG | OXYGEN SATURATION: 98 % | WEIGHT: 194 LBS | HEIGHT: 64 IN | BODY MASS INDEX: 33.12 KG/M2

## 2023-05-05 DIAGNOSIS — D50.9 IRON DEFICIENCY ANEMIA, UNSPECIFIED IRON DEFICIENCY ANEMIA TYPE: ICD-10-CM

## 2023-05-05 DIAGNOSIS — D69.3 IDIOPATHIC THROMBOCYTOPENIC PURPURA: Primary | ICD-10-CM

## 2023-05-05 DIAGNOSIS — I10 PRIMARY HYPERTENSION: ICD-10-CM

## 2023-05-05 LAB
ALBUMIN SERPL BCP-MCNC: 3.6 G/DL (ref 3.5–5)
ALBUMIN/GLOB SERPL: 1 {RATIO}
ALP SERPL-CCNC: 89 U/L (ref 55–142)
ALT SERPL W P-5'-P-CCNC: 55 U/L (ref 13–56)
ANION GAP SERPL CALCULATED.3IONS-SCNC: 7 MMOL/L (ref 7–16)
ANISOCYTOSIS BLD QL SMEAR: ABNORMAL
AST SERPL W P-5'-P-CCNC: 39 U/L (ref 15–37)
BASOPHILS # BLD AUTO: 0.09 K/UL (ref 0–0.2)
BASOPHILS NFR BLD AUTO: 1.4 % (ref 0–1)
BILIRUB SERPL-MCNC: 0.4 MG/DL (ref ?–1.2)
BUN SERPL-MCNC: 8 MG/DL (ref 7–18)
BUN/CREAT SERPL: 10 (ref 6–20)
CALCIUM SERPL-MCNC: 8.6 MG/DL (ref 8.5–10.1)
CHLORIDE SERPL-SCNC: 105 MMOL/L (ref 98–107)
CO2 SERPL-SCNC: 30 MMOL/L (ref 21–32)
CREAT SERPL-MCNC: 0.83 MG/DL (ref 0.55–1.02)
DIFFERENTIAL METHOD BLD: ABNORMAL
EGFR (NO RACE VARIABLE) (RUSH/TITUS): 77 ML/MIN/1.73M2
EOSINOPHIL # BLD AUTO: 0.18 K/UL (ref 0–0.5)
EOSINOPHIL NFR BLD AUTO: 2.8 % (ref 1–4)
ERYTHROCYTE [DISTWIDTH] IN BLOOD BY AUTOMATED COUNT: 20.5 % (ref 11.5–14.5)
GGT SERPL-CCNC: 88 U/L (ref 5–55)
GLOBULIN SER-MCNC: 3.5 G/DL (ref 2–4)
GLUCOSE SERPL-MCNC: 96 MG/DL (ref 74–106)
HCT VFR BLD AUTO: 26.6 % (ref 38–47)
HGB BLD-MCNC: 8 G/DL (ref 12–16)
HYPOCHROMIA BLD QL SMEAR: ABNORMAL
IMM GRANULOCYTES # BLD AUTO: 0.02 K/UL (ref 0–0.04)
IMM GRANULOCYTES NFR BLD: 0.3 % (ref 0–0.4)
LYMPHOCYTES # BLD AUTO: 1.62 K/UL (ref 1–4.8)
LYMPHOCYTES NFR BLD AUTO: 25.5 % (ref 27–41)
MCH RBC QN AUTO: 25 PG (ref 27–31)
MCHC RBC AUTO-ENTMCNC: 30.1 G/DL (ref 32–36)
MCV RBC AUTO: 83.1 FL (ref 80–96)
MONOCYTES # BLD AUTO: 0.58 K/UL (ref 0–0.8)
MONOCYTES NFR BLD AUTO: 9.1 % (ref 2–6)
MPC BLD CALC-MCNC: ABNORMAL G/DL
NEUTROPHILS # BLD AUTO: 3.86 K/UL (ref 1.8–7.7)
NEUTROPHILS NFR BLD AUTO: 60.9 % (ref 53–65)
NRBC # BLD AUTO: 0 X10E3/UL
NRBC, AUTO (.00): 0 %
PLATELET # BLD AUTO: 118 K/UL (ref 150–400)
PLATELET MORPHOLOGY: ABNORMAL
POLYCHROMASIA BLD QL SMEAR: ABNORMAL
POTASSIUM SERPL-SCNC: 3.8 MMOL/L (ref 3.5–5.1)
PROT SERPL-MCNC: 7.1 G/DL (ref 6.4–8.2)
RBC # BLD AUTO: 3.2 M/UL (ref 4.2–5.4)
SODIUM SERPL-SCNC: 138 MMOL/L (ref 136–145)
WBC # BLD AUTO: 6.35 K/UL (ref 4.5–11)

## 2023-05-05 PROCEDURE — 3288F PR FALLS RISK ASSESSMENT DOCUMENTED: ICD-10-PCS | Mod: ,,, | Performed by: FAMILY MEDICINE

## 2023-05-05 PROCEDURE — 82977 GAMMA GT: ICD-10-PCS | Mod: ,,, | Performed by: CLINICAL MEDICAL LABORATORY

## 2023-05-05 PROCEDURE — 3044F HG A1C LEVEL LT 7.0%: CPT | Mod: ,,, | Performed by: FAMILY MEDICINE

## 2023-05-05 PROCEDURE — 1159F MED LIST DOCD IN RCRD: CPT | Mod: ,,, | Performed by: FAMILY MEDICINE

## 2023-05-05 PROCEDURE — 3074F SYST BP LT 130 MM HG: CPT | Mod: ,,, | Performed by: FAMILY MEDICINE

## 2023-05-05 PROCEDURE — 1125F AMNT PAIN NOTED PAIN PRSNT: CPT | Mod: ,,, | Performed by: FAMILY MEDICINE

## 2023-05-05 PROCEDURE — 3078F PR MOST RECENT DIASTOLIC BLOOD PRESSURE < 80 MM HG: ICD-10-PCS | Mod: ,,, | Performed by: FAMILY MEDICINE

## 2023-05-05 PROCEDURE — 1159F PR MEDICATION LIST DOCUMENTED IN MEDICAL RECORD: ICD-10-PCS | Mod: ,,, | Performed by: FAMILY MEDICINE

## 2023-05-05 PROCEDURE — 4010F ACE/ARB THERAPY RXD/TAKEN: CPT | Mod: ,,, | Performed by: FAMILY MEDICINE

## 2023-05-05 PROCEDURE — 99213 PR OFFICE/OUTPT VISIT, EST, LEVL III, 20-29 MIN: ICD-10-PCS | Mod: ,,, | Performed by: FAMILY MEDICINE

## 2023-05-05 PROCEDURE — 1125F PR PAIN SEVERITY QUANTIFIED, PAIN PRESENT: ICD-10-PCS | Mod: ,,, | Performed by: FAMILY MEDICINE

## 2023-05-05 PROCEDURE — 3078F DIAST BP <80 MM HG: CPT | Mod: ,,, | Performed by: FAMILY MEDICINE

## 2023-05-05 PROCEDURE — 1100F PTFALLS ASSESS-DOCD GE2>/YR: CPT | Mod: ,,, | Performed by: FAMILY MEDICINE

## 2023-05-05 PROCEDURE — 3008F PR BODY MASS INDEX (BMI) DOCUMENTED: ICD-10-PCS | Mod: ,,, | Performed by: FAMILY MEDICINE

## 2023-05-05 PROCEDURE — 80053 COMPREHEN METABOLIC PANEL: CPT | Mod: ,,, | Performed by: CLINICAL MEDICAL LABORATORY

## 2023-05-05 PROCEDURE — 3008F BODY MASS INDEX DOCD: CPT | Mod: ,,, | Performed by: FAMILY MEDICINE

## 2023-05-05 PROCEDURE — 85025 CBC WITH DIFFERENTIAL: ICD-10-PCS | Mod: ,,, | Performed by: CLINICAL MEDICAL LABORATORY

## 2023-05-05 PROCEDURE — 99213 OFFICE O/P EST LOW 20 MIN: CPT | Mod: ,,, | Performed by: FAMILY MEDICINE

## 2023-05-05 PROCEDURE — 85025 COMPLETE CBC W/AUTO DIFF WBC: CPT | Mod: ,,, | Performed by: CLINICAL MEDICAL LABORATORY

## 2023-05-05 PROCEDURE — 82977 ASSAY OF GGT: CPT | Mod: ,,, | Performed by: CLINICAL MEDICAL LABORATORY

## 2023-05-05 PROCEDURE — 3044F PR MOST RECENT HEMOGLOBIN A1C LEVEL <7.0%: ICD-10-PCS | Mod: ,,, | Performed by: FAMILY MEDICINE

## 2023-05-05 PROCEDURE — 3074F PR MOST RECENT SYSTOLIC BLOOD PRESSURE < 130 MM HG: ICD-10-PCS | Mod: ,,, | Performed by: FAMILY MEDICINE

## 2023-05-05 PROCEDURE — 80053 COMPREHENSIVE METABOLIC PANEL: ICD-10-PCS | Mod: ,,, | Performed by: CLINICAL MEDICAL LABORATORY

## 2023-05-05 PROCEDURE — 3288F FALL RISK ASSESSMENT DOCD: CPT | Mod: ,,, | Performed by: FAMILY MEDICINE

## 2023-05-05 PROCEDURE — 4010F PR ACE/ARB THEARPY RXD/TAKEN: ICD-10-PCS | Mod: ,,, | Performed by: FAMILY MEDICINE

## 2023-05-05 PROCEDURE — 1100F PR PT FALLS ASSESS DOC 2+ FALLS/FALL W/INJURY/YR: ICD-10-PCS | Mod: ,,, | Performed by: FAMILY MEDICINE

## 2023-05-05 NOTE — ASSESSMENT & PLAN NOTE
From cytopenia with resultant the anemia with need for transfusion about 10 days ago.  Will recheck her eyes CBC and platelet count today.  Referral back to Hematology.

## 2023-05-05 NOTE — ASSESSMENT & PLAN NOTE
Time deficiency anemia etiology uncertain as the she denies any blood in her stools or black tarry stools.  Will again refer her to Hematology.  CBC again today.  Will repeat CBC weekly for 12 weeks.

## 2023-05-05 NOTE — LETTER
May 5, 2023      [facility]  [address]  [facility] [place] [address]  Phone: [phone]  Fax: [phone]       Patient: Ene Martin   YOB: 1955  Date of Visit: 05/05/2023    To Whom It May Concern:    Cheyanne Martin  was at [facility] on 05/05/2023. The patient may return to work/school on 05/08/2023 with no restrictions. If you have any questions or concerns, or if I can be of further assistance, please do not hesitate to contact me.    Sincerely,    Emilia Mora RN

## 2023-05-05 NOTE — PROGRESS NOTES
Dagoberto Ravi DO   [address] [place]  [address]      PATIENT NAME: Ene Martin  : 1955  DATE: 23  MRN: [id number]      Billing Provider: Dagoberto Ravi DO  Level of Service:   Patient PCP Information       Provider PCP Type    Dagoberto Ravi DO General            Reason for Visit / Chief Complaint: Follow-up (ER follow up from [facility] on 2023. )       Update PCP  Update Chief Complaint         History of Present Illness / Problem Focused Workflow     Ene Martin presents to the clinic with Follow-up (ER follow up from [facility] on 2023. )     Patient is in today for follow-up on her of anemia.  She had hemoglobin that dropped into the 5 range of was given 2 units of packed red blood cells.  Will have her come in today for follow-up and repeat blood counts.  She has thrombocytopenia purpura and has not seen Hematology in approximately 3 months.  Patient does report some weakness but no blood in her stools or black tarry stools.  No bruising excessively.      Review of Systems     Review of Systems   Constitutional:  Negative for activity change, appetite change, chills, fatigue and fever.   HENT:  Negative for nasal congestion, ear discharge, ear pain, mouth dryness, mouth sores, postnasal drip, sinus pressure/congestion, sore throat and voice change.    Eyes:  Negative for pain, discharge, redness, itching and visual disturbance.   Respiratory:  Negative for apnea, cough, chest tightness, shortness of breath and wheezing.    Cardiovascular:  Negative for chest pain, palpitations and leg swelling.   Gastrointestinal:  Negative for abdominal distention, abdominal pain, anal bleeding, blood in stool, change in bowel habit, constipation, diarrhea, nausea, vomiting, reflux and change in bowel habit.   Endocrine: Negative for cold intolerance, heat intolerance, polydipsia, polyphagia and polyuria.   Genitourinary:  Negative for difficulty  urinating, enuresis, frequency, genital sores, hematuria, hot flashes, menstrual irregularity, urgency and vaginal dryness.   Musculoskeletal:  Negative for arthralgias, back pain, gait problem, leg pain, myalgias and neck pain.   Integumentary:  Negative for rash, mole/lesion, breast mass, breast discharge and breast tenderness.   Allergic/Immunologic: Negative for environmental allergies and food allergies.   Neurological:  Positive for weakness. Negative for dizziness, vertigo, tremors, seizures, syncope, facial asymmetry, speech difficulty, light-headedness, numbness, headaches, coordination difficulties, memory loss and coordination difficulties.   Hematological:  Negative for adenopathy. Does not bruise/bleed easily.   Psychiatric/Behavioral:  Negative for agitation, behavioral problems, confusion, decreased concentration, dysphoric mood, hallucinations, self-injury, sleep disturbance and suicidal ideas. The patient is not nervous/anxious and is not hyperactive.    Breast: Negative for mass and tenderness    Medical / Social / Family History     Past Medical History:   Diagnosis Date    Anxiety and depression     Cataract     Encounter for blood transfusion 2023    [facility]    ETOH abuse 10/24/2022    GERD (gastroesophageal reflux disease)     History of alcohol abuse     Hyperlipidemia     Hypertension     Hypothyroidism     Idiopathic thrombocytopenic purpura     Insomnia 2022    Iron deficiency anemia 2023    Left bundle branch block     Osteoporosis     Pernicious anemia        Past Surgical History:   Procedure Laterality Date    CATARACT EXTRACTION Bilateral      SECTION      COLONOSCOPY  2023    diagnostic to due blood loss-Dr. Tran-repeat in 1 year    ESOPHAGOGASTRODUODENOSCOPY  2023    HYSTERECTOMY      OOPHORECTOMY      THYROIDECTOMY         Social History  Ms.  reports that she has been smoking vaping with nicotine and cigarettes. She started  smoking about 51 years ago. She has been smoking an average of .5 packs per day. She has never used smokeless tobacco. She reports current alcohol use. She reports that she does not use drugs.    Family History  Ms.'s family history includes Lung cancer in her father; Lupus in her daughter; No Known Problems in her brother, maternal grandfather, maternal grandmother, paternal grandfather, paternal grandmother, sister, and son; Osteoporosis in her mother.    Medications and Allergies     Medications  Outpatient Medications Marked as Taking for the 5/5/23 encounter (Office Visit) with Dagoberto Ravi, DO   Medication Sig Dispense Refill    BYSTOLIC 5 mg Tab Take 5 mg by mouth once daily.      calcium carbonate-vitamin D3 500 mg-3.125 mcg (125 unit) per tablet Take 1 tablet by mouth once daily.      cholecalciferol, vitamin D3, 125 mcg (5,000 unit) Tab Take 5,000 Units by mouth once daily.      ENTRESTO 24-26 mg per tablet Take 1 tablet by mouth 2 (two) times daily.      ferrous sulfate (FEOSOL) 325 mg (65 mg iron) Tab tablet Take 325 mg by mouth 2 (two) times daily.      furosemide (LASIX) 20 MG tablet Take 2 tablets (40 mg total) by mouth once daily. 30 tablet 5    pantoprazole (PROTONIX) 40 MG tablet Take 1 tablet (40 mg total) by mouth 2 (two) times daily. for 14 days 28 tablet 0    potassium chloride (KLOR-CON) 10 MEQ TbSR Take 1 tablet (10 mEq total) by mouth once daily. 30 tablet 5    SYNTHROID 125 mcg tablet Take 1 tablet (125 mcg total) by mouth once daily. 30 tablet 5    TRINTELLIX 10 mg Tab TAKE 1 TABLET BY MOUTH DAILY. 30 tablet 1       Allergies  Review of patient's allergies indicates:   Allergen Reactions    Lisinopril Other (See Comments)     thrombocytopenia       Physical Examination     Vitals:    05/05/23 1309   BP: 126/68   Pulse: 77   Resp: 17   Temp: 98.5 °F (36.9 °C)     Physical Exam  Vitals reviewed.   Constitutional:       Appearance: She is normal weight.      Comments: Patient appears very  pale.   HENT:      Head: Normocephalic and atraumatic.      Nose: Nose normal.      Mouth/Throat:      Mouth: Mucous membranes are moist.      Pharynx: Oropharynx is clear. No oropharyngeal exudate or posterior oropharyngeal erythema.   Eyes:      General: No scleral icterus.     Extraocular Movements: Extraocular movements intact.      Conjunctiva/sclera: Conjunctivae normal.      Pupils: Pupils are equal, round, and reactive to light.   Neck:      Vascular: No carotid bruit.   Cardiovascular:      Rate and Rhythm: Normal rate and regular rhythm.      Pulses: Normal pulses.      Heart sounds: Normal heart sounds. No murmur heard.    No gallop.   Pulmonary:      Effort: Pulmonary effort is normal.      Breath sounds: Normal breath sounds. No wheezing.   Abdominal:      General: Abdomen is flat. Bowel sounds are normal.      Palpations: Abdomen is soft.      Tenderness: There is no abdominal tenderness.   Musculoskeletal:         General: Normal range of motion.      Cervical back: Normal range of motion and neck supple.      Right lower leg: No edema.      Left lower leg: No edema.   Lymphadenopathy:      Cervical: No cervical adenopathy.   Skin:     General: Skin is warm and dry.      Capillary Refill: Capillary refill takes less than 2 seconds.      Coloration: Skin is pale. Skin is not jaundiced.      Findings: No erythema or lesion.   Neurological:      General: No focal deficit present.      Mental Status: She is alert and oriented to person, place, and time. Mental status is at baseline.      Cranial Nerves: No cranial nerve deficit.      Sensory: No sensory deficit.      Motor: No weakness.      Coordination: Coordination normal.      Gait: Gait normal.      Deep Tendon Reflexes: Reflexes normal.   Psychiatric:         Mood and Affect: Mood normal.         Behavior: Behavior normal.         Thought Content: Thought content normal.         Judgment: Judgment normal.             Lab Results   Component Value  Date    WBC 6.73 04/29/2023    HGB 5.8 (LL) 04/29/2023    HCT 20.4 (L) 04/29/2023    MCV 79.7 (L) 04/29/2023     (L) 04/29/2023          Sodium   Date Value Ref Range Status   04/29/2023 140 136 - 145 mmol/L Final     Potassium   Date Value Ref Range Status   04/29/2023 4.1 3.5 - 5.1 mmol/L Final     Chloride   Date Value Ref Range Status   04/29/2023 103 98 - 107 mmol/L Final     CO2   Date Value Ref Range Status   04/29/2023 30 21 - 32 mmol/L Final     Glucose   Date Value Ref Range Status   04/29/2023 109 (H) 74 - 106 mg/dL Final     BUN   Date Value Ref Range Status   04/29/2023 10 7 - 18 mg/dL Final     Creatinine   Date Value Ref Range Status   04/29/2023 1.05 (H) 0.55 - 1.02 mg/dL Final     Calcium   Date Value Ref Range Status   04/29/2023 8.3 (L) 8.5 - 10.1 mg/dL Final     Total Protein   Date Value Ref Range Status   04/29/2023 6.9 6.4 - 8.2 g/dL Final     Albumin   Date Value Ref Range Status   04/29/2023 3.2 (L) 3.5 - 5.0 g/dL Final     Bilirubin, Total   Date Value Ref Range Status   04/29/2023 0.3 >0.0 - 1.2 mg/dL Final     Alk Phos   Date Value Ref Range Status   04/29/2023 82 55 - 142 U/L Final     AST   Date Value Ref Range Status   04/29/2023 47 (H) 15 - 37 U/L Final     ALT   Date Value Ref Range Status   04/29/2023 66 (H) 13 - 56 U/L Final     Anion Gap   Date Value Ref Range Status   04/29/2023 11 7 - 16 mmol/L Final     eGFR   Date Value Ref Range Status   07/18/2022 62 >=60 mL/min/1.73m² Final      Mammo Digital Screening Bilat  Narrative: Result:   Mammo Digital Screening Bilat     History:  Patient is 67 y.o. and is seen for a screening mammogram.    Films Compared:  3/16/2015     Findings:     The breasts have scattered areas of fibroglandular density. There is no   evidence of suspicious masses, calcifications, or other abnormal findings.  Impression: Bilateral  There is no mammographic evidence of malignancy.    BI-RADS Category:   Overall: 1 - Negative        Recommendation:  Routine screening mammogram in 1 year is recommended.    Your estimated lifetime risk of breast cancer (to age 85) based on   Tyrer-Cuzick risk assessment model is Tyrer-Cuzick: 3.92 %. According to   the American Cancer Society, patients with a lifetime breast cancer risk   of 20% or higher might benefit from supplemental screening tests.     Procedures   Lab Results   Component Value Date    CHOL 131 02/12/2023     Lab Results   Component Value Date    HDL 38 (L) 02/12/2023     Lab Results   Component Value Date    LDLCALC 70 02/12/2023     Lab Results   Component Value Date    TRIG 117 02/12/2023     Lab Results   Component Value Date    CHOLHDL 3.4 02/12/2023      Lab Results   Component Value Date    HGBA1C 5.5 02/20/2023      Lab Results   Component Value Date    TSH 2.710 03/20/2023    [id number] 11.7 03/20/2023      Assessment and Plan (including Health Maintenance)      Problem List  Smart Sets  Document Outside HM   :    Plan:         Health Maintenance Due   Topic Date Due    DEXA Scan  Never done       Problem List Items Addressed This Visit          Cardiac/Vascular    Hypertension    Current Assessment & Plan     Hypertension currently well controlled no change in medication.  Follow-up every 3 months.           Relevant Orders    Comprehensive Metabolic Panel    Gamma GT       Hematology    Idiopathic thrombocytopenic purpura - Primary    Current Assessment & Plan     From cytopenia with resultant the anemia with need for transfusion about 10 days ago.  Will recheck her eyes CBC and platelet count today.  Referral back to Hematology.           Relevant Orders    Comprehensive Metabolic Panel    Gamma GT    CBC Auto Differential       Oncology    Iron deficiency anemia    Current Assessment & Plan     Time deficiency anemia etiology uncertain as the she denies any blood in her stools or black tarry stools.  Will again refer her to Hematology.  CBC again today.  Will repeat CBC weekly  for 12 weeks.              Health Maintenance Topics with due status: Not Due       Topic Last Completion Date    Lipid Panel 02/12/2023    Colorectal Cancer Screening 02/13/2023    Hemoglobin A1c (Diabetic Prevention Screening) 02/20/2023    Mammogram 03/20/2023    Influenza Vaccine Not Due       Future Appointments   Date Time Provider Department Center   5/22/2023  1:00 PM Savita Graff MD [facility]   6/21/2023  3:30 PM Dagoberto Ravi DO [facility] [place]   6/28/2023  9:00 AM Papa Melton DO [facility] [place]   3/25/2024 12:45 PM [facility]        Follow up in about 1 week (around 5/12/2023).     Signature:  DO [facility]  [address] [address] [place]  [address]    Date of encounter: 5/5/23

## 2023-05-22 ENCOUNTER — OFFICE VISIT (OUTPATIENT)
Dept: FAMILY MEDICINE | Facility: CLINIC | Age: 68
End: 2023-05-22
Payer: COMMERCIAL

## 2023-05-22 VITALS
BODY MASS INDEX: 31.99 KG/M2 | SYSTOLIC BLOOD PRESSURE: 138 MMHG | RESPIRATION RATE: 18 BRPM | TEMPERATURE: 99 F | WEIGHT: 187.38 LBS | HEART RATE: 79 BPM | DIASTOLIC BLOOD PRESSURE: 70 MMHG | OXYGEN SATURATION: 98 % | HEIGHT: 64 IN

## 2023-05-22 DIAGNOSIS — D69.3 IDIOPATHIC THROMBOCYTOPENIC PURPURA: Primary | ICD-10-CM

## 2023-05-22 PROBLEM — K92.2 UGIB (UPPER GASTROINTESTINAL BLEED): Status: RESOLVED | Noted: 2023-02-11 | Resolved: 2023-05-22

## 2023-05-22 LAB
BASOPHILS # BLD AUTO: 0.04 K/UL (ref 0–0.2)
BASOPHILS NFR BLD AUTO: 0.8 % (ref 0–1)
DIFFERENTIAL METHOD BLD: ABNORMAL
EOSINOPHIL # BLD AUTO: 0.02 K/UL (ref 0–0.5)
EOSINOPHIL NFR BLD AUTO: 0.4 % (ref 1–4)
ERYTHROCYTE [DISTWIDTH] IN BLOOD BY AUTOMATED COUNT: 20.2 % (ref 11.5–14.5)
HCT VFR BLD AUTO: 30.5 % (ref 38–47)
HGB BLD-MCNC: 9.2 G/DL (ref 12–16)
LYMPHOCYTES # BLD AUTO: 1.19 K/UL (ref 1–4.8)
LYMPHOCYTES NFR BLD AUTO: 24 % (ref 27–41)
MCH RBC QN AUTO: 23.2 PG (ref 27–31)
MCHC RBC AUTO-ENTMCNC: 30.2 G/DL (ref 32–36)
MCV RBC AUTO: 77 FL (ref 80–96)
MONOCYTES # BLD AUTO: 0.46 K/UL (ref 0–0.8)
MONOCYTES NFR BLD AUTO: 9.3 % (ref 2–6)
MPC BLD CALC-MCNC: ABNORMAL G/DL
NEUTROPHILS # BLD AUTO: 3.24 K/UL (ref 1.8–7.7)
NEUTROPHILS NFR BLD AUTO: 65.5 % (ref 53–65)
PLATELET # BLD AUTO: 28 K/UL (ref 150–400)
RBC # BLD AUTO: 3.96 M/UL (ref 4.2–5.4)
WBC # BLD AUTO: 4.95 K/UL (ref 4.5–11)

## 2023-05-22 PROCEDURE — 1160F PR REVIEW ALL MEDS BY PRESCRIBER/CLIN PHARMACIST DOCUMENTED: ICD-10-PCS | Mod: ,,, | Performed by: NURSE PRACTITIONER

## 2023-05-22 PROCEDURE — 99213 OFFICE O/P EST LOW 20 MIN: CPT | Mod: ,,, | Performed by: NURSE PRACTITIONER

## 2023-05-22 PROCEDURE — 85025 COMPLETE CBC W/AUTO DIFF WBC: CPT | Performed by: NURSE PRACTITIONER

## 2023-05-22 PROCEDURE — 1125F PR PAIN SEVERITY QUANTIFIED, PAIN PRESENT: ICD-10-PCS | Mod: ,,, | Performed by: NURSE PRACTITIONER

## 2023-05-22 PROCEDURE — 99213 PR OFFICE/OUTPT VISIT, EST, LEVL III, 20-29 MIN: ICD-10-PCS | Mod: ,,, | Performed by: NURSE PRACTITIONER

## 2023-05-22 PROCEDURE — 3008F BODY MASS INDEX DOCD: CPT | Mod: ,,, | Performed by: NURSE PRACTITIONER

## 2023-05-22 PROCEDURE — 1160F RVW MEDS BY RX/DR IN RCRD: CPT | Mod: ,,, | Performed by: NURSE PRACTITIONER

## 2023-05-22 PROCEDURE — 3044F PR MOST RECENT HEMOGLOBIN A1C LEVEL <7.0%: ICD-10-PCS | Mod: ,,, | Performed by: NURSE PRACTITIONER

## 2023-05-22 PROCEDURE — 3288F PR FALLS RISK ASSESSMENT DOCUMENTED: ICD-10-PCS | Mod: ,,, | Performed by: NURSE PRACTITIONER

## 2023-05-22 PROCEDURE — 3075F SYST BP GE 130 - 139MM HG: CPT | Mod: ,,, | Performed by: NURSE PRACTITIONER

## 2023-05-22 PROCEDURE — 3075F PR MOST RECENT SYSTOLIC BLOOD PRESS GE 130-139MM HG: ICD-10-PCS | Mod: ,,, | Performed by: NURSE PRACTITIONER

## 2023-05-22 PROCEDURE — 3078F PR MOST RECENT DIASTOLIC BLOOD PRESSURE < 80 MM HG: ICD-10-PCS | Mod: ,,, | Performed by: NURSE PRACTITIONER

## 2023-05-22 PROCEDURE — 1159F PR MEDICATION LIST DOCUMENTED IN MEDICAL RECORD: ICD-10-PCS | Mod: ,,, | Performed by: NURSE PRACTITIONER

## 2023-05-22 PROCEDURE — 1159F MED LIST DOCD IN RCRD: CPT | Mod: ,,, | Performed by: NURSE PRACTITIONER

## 2023-05-22 PROCEDURE — 1100F PTFALLS ASSESS-DOCD GE2>/YR: CPT | Mod: ,,, | Performed by: NURSE PRACTITIONER

## 2023-05-22 PROCEDURE — 3288F FALL RISK ASSESSMENT DOCD: CPT | Mod: ,,, | Performed by: NURSE PRACTITIONER

## 2023-05-22 PROCEDURE — 1100F PR PT FALLS ASSESS DOC 2+ FALLS/FALL W/INJURY/YR: ICD-10-PCS | Mod: ,,, | Performed by: NURSE PRACTITIONER

## 2023-05-22 PROCEDURE — 4010F ACE/ARB THERAPY RXD/TAKEN: CPT | Mod: ,,, | Performed by: NURSE PRACTITIONER

## 2023-05-22 PROCEDURE — 3008F PR BODY MASS INDEX (BMI) DOCUMENTED: ICD-10-PCS | Mod: ,,, | Performed by: NURSE PRACTITIONER

## 2023-05-22 PROCEDURE — 3078F DIAST BP <80 MM HG: CPT | Mod: ,,, | Performed by: NURSE PRACTITIONER

## 2023-05-22 PROCEDURE — 3044F HG A1C LEVEL LT 7.0%: CPT | Mod: ,,, | Performed by: NURSE PRACTITIONER

## 2023-05-22 PROCEDURE — 1125F AMNT PAIN NOTED PAIN PRSNT: CPT | Mod: ,,, | Performed by: NURSE PRACTITIONER

## 2023-05-22 PROCEDURE — 4010F PR ACE/ARB THEARPY RXD/TAKEN: ICD-10-PCS | Mod: ,,, | Performed by: NURSE PRACTITIONER

## 2023-05-22 RX ORDER — DEXAMETHASONE 4 MG/1
TABLET ORAL
Qty: 40 TABLET | Refills: 0 | Status: SHIPPED | OUTPATIENT
Start: 2023-05-22 | End: 2023-06-20

## 2023-05-22 NOTE — LETTER
May 22, 2023      [facility]  [address]  [place] [address]  Phone: [phone]  Fax: [phone]       Patient: Ene Matrin   YOB: 1955  Date of Visit: 05/22/2023    To Whom It May Concern:    Cheyanne Martin  was at [facility] on 05/22/2023. The patient may return to work/school on 05/23/2023 with no restrictions. If you have any questions or concerns, or if I can be of further assistance, please do not hesitate to contact me.    Sincerely,    Emilia Mora RN

## 2023-05-22 NOTE — PROGRESS NOTES
Kristyn Brito NP   [facility]  [address]  [place]  [address]      PATIENT NAME: Ene Martin  : 1955  DATE: 23  MRN: [id number]      Billing Provider: Kristyn Brito NP  Level of Service: CA OFFICE/OUTPT VISIT, EST, LEVL III, 20-29 MIN  Patient PCP Information       Provider PCP Type    Dagoberto Ravi DO General            Reason for Visit / Chief Complaint: Dizziness (Reports feeling dizzy and weak since Saturday.  Lightheadedness and nausea also.  States she has also been experiencing shortness of breath.), Anorexia (Reports  not being able to eat much of anything for the past 3 days.  No appetite at all.), and Diarrhea (Reports having some diarrhea yesterday.)         History of Present Illness / Problem Focused Workflow     Ene Martin presents to the clinic with Dizziness (Reports feeling dizzy and weak since Saturday.  Lightheadedness and nausea also.  States she has also been experiencing shortness of breath.), Anorexia (Reports  not being able to eat much of anything for the past 3 days.  No appetite at all.), and Diarrhea (Reports having some diarrhea yesterday.)     67 year old female presents to clinic with complaints of dizziness, fatigue, feeling lightheaded and nauseous. States she has also been having shortness of breath and feeling winded. She has had poor appetite and some diarrhea. She denies any dark stools or dark or coffee ground emesis. She has history of ITP and Iron deficiency anemia. Her last CBC was 2 weeks ago and Hgb/Hct was 8.0/26.6 at that time with platelet count of 118. She was supposed to come back for weekly CBC but has not been compliant with this.       Review of Systems     @Review of Systems   Constitutional:  Positive for fatigue. Negative for activity change, appetite change and fever.   HENT:  Negative for nasal congestion, ear pain, rhinorrhea, sinus pressure/congestion and sore throat.    Eyes:  Negative for pain, redness,  visual disturbance and eye dryness.   Respiratory:  Positive for shortness of breath. Negative for cough.    Cardiovascular:  Negative for chest pain and leg swelling.   Gastrointestinal:  Positive for diarrhea and nausea. Negative for abdominal distention, abdominal pain and constipation.   Endocrine: Negative for cold intolerance, heat intolerance and polyuria.   Genitourinary:  Negative for bladder incontinence, dysuria, frequency and urgency.   Musculoskeletal:  Negative for arthralgias, gait problem and myalgias.   Integumentary:  Negative for color change, rash and wound.   Allergic/Immunologic: Negative for environmental allergies and food allergies.   Neurological:  Positive for dizziness, light-headedness and headaches. Negative for weakness.   Psychiatric/Behavioral:  Negative for behavioral problems and sleep disturbance.      Medical / Social / Family History     Past Medical History:   Diagnosis Date    Anxiety and depression     Cataract     Encounter for blood transfusion 2023    [facility]    ETOH abuse 10/24/2022    GERD (gastroesophageal reflux disease)     History of alcohol abuse     Hyperlipidemia     Hypertension     Hypothyroidism     Idiopathic thrombocytopenic purpura     Insomnia 2022    Iron deficiency anemia 2023    Left bundle branch block     Osteoporosis     Pernicious anemia        Past Surgical History:   Procedure Laterality Date    CATARACT EXTRACTION Bilateral      SECTION      COLONOSCOPY  2023    diagnostic to due blood loss-Dr. Tran-repeat in 1 year    ESOPHAGOGASTRODUODENOSCOPY  2023    HYSTERECTOMY      OOPHORECTOMY      THYROIDECTOMY         Social History  Ms.  reports that she has been smoking vaping with nicotine and cigarettes. She started smoking about 51 years ago. She has a 16.50 pack-year smoking history. She has been exposed to tobacco smoke. She has never used smokeless tobacco. She reports current alcohol use.  She reports that she does not use drugs.    Family History  Ms.'s family history includes Lung cancer in her father; Lupus in her daughter; No Known Problems in her brother, maternal grandfather, maternal grandmother, paternal grandfather, paternal grandmother, sister, and son; Osteoporosis in her mother.    Medications and Allergies     Medications  Outpatient Medications Marked as Taking for the 5/22/23 encounter (Office Visit) with Kristyn Brito NP   Medication Sig Dispense Refill    BYSTOLIC 5 mg Tab Take 5 mg by mouth once daily.      calcium carbonate-vitamin D3 500 mg-3.125 mcg (125 unit) per tablet Take 1 tablet by mouth once daily.      cholecalciferol, vitamin D3, 125 mcg (5,000 unit) Tab Take 5,000 Units by mouth once daily.      ENTRESTO 24-26 mg per tablet Take 1 tablet by mouth 2 (two) times daily.      furosemide (LASIX) 20 MG tablet Take 2 tablets (40 mg total) by mouth once daily. 30 tablet 5    potassium chloride (KLOR-CON) 10 MEQ TbSR Take 1 tablet (10 mEq total) by mouth once daily. 30 tablet 5    SYNTHROID 125 mcg tablet Take 1 tablet (125 mcg total) by mouth once daily. 30 tablet 5    traZODone (DESYREL) 100 MG tablet Take 1 tablet (100 mg total) by mouth every evening. 30 tablet 11    TRINTELLIX 10 mg Tab TAKE 1 TABLET BY MOUTH DAILY. 30 tablet 1       Allergies  Review of patient's allergies indicates:   Allergen Reactions    Lisinopril Other (See Comments)     thrombocytopenia       Physical Examination     Vitals:    05/22/23 1332   BP: 138/70   Pulse: 79   Resp: 18   Temp: 98.6 °F (37 °C)     Physical Exam  Vitals and nursing note reviewed.   Constitutional:       Comments: Patient appears very pale    HENT:      Head: Normocephalic.      Right Ear: Tympanic membrane normal.      Left Ear: Tympanic membrane normal.      Nose: Nose normal.      Mouth/Throat:      Mouth: Mucous membranes are moist.      Pharynx: Oropharynx is clear. No posterior oropharyngeal erythema.   Eyes:       Conjunctiva/sclera: Conjunctivae normal.   Cardiovascular:      Rate and Rhythm: Normal rate and regular rhythm.      Pulses: Normal pulses.      Heart sounds: Normal heart sounds.   Pulmonary:      Effort: Pulmonary effort is normal.      Breath sounds: Normal breath sounds.   Abdominal:      General: Abdomen is flat. Bowel sounds are normal. There is no distension.      Palpations: Abdomen is soft.   Musculoskeletal:         General: No swelling or tenderness. Normal range of motion.      Cervical back: Normal range of motion.      Right lower leg: No edema.      Left lower leg: No edema.   Skin:     General: Skin is warm and dry.      Capillary Refill: Capillary refill takes less than 2 seconds.      Coloration: Skin is pale.   Neurological:      Mental Status: She is alert. Mental status is at baseline.   Psychiatric:         Mood and Affect: Mood normal.         Behavior: Behavior normal.             Lab Results   Component Value Date    WBC 4.95 05/22/2023    HGB 9.2 (L) 05/22/2023    HCT 30.5 (L) 05/22/2023    MCV 77.0 (L) 05/22/2023    PLT 28 (LL) 05/22/2023        CMP  Sodium   Date Value Ref Range Status   05/05/2023 138 136 - 145 mmol/L Final     Potassium   Date Value Ref Range Status   05/05/2023 3.8 3.5 - 5.1 mmol/L Final     Chloride   Date Value Ref Range Status   05/05/2023 105 98 - 107 mmol/L Final     CO2   Date Value Ref Range Status   05/05/2023 30 21 - 32 mmol/L Final     Glucose   Date Value Ref Range Status   05/05/2023 96 74 - 106 mg/dL Final     BUN   Date Value Ref Range Status   05/05/2023 8 7 - 18 mg/dL Final     Creatinine   Date Value Ref Range Status   05/05/2023 0.83 0.55 - 1.02 mg/dL Final     Calcium   Date Value Ref Range Status   05/05/2023 8.6 8.5 - 10.1 mg/dL Final     Total Protein   Date Value Ref Range Status   05/05/2023 7.1 6.4 - 8.2 g/dL Final     Albumin   Date Value Ref Range Status   05/05/2023 3.6 3.5 - 5.0 g/dL Final     Bilirubin, Total   Date Value Ref Range Status    05/05/2023 0.4 >0.0 - 1.2 mg/dL Final     Alk Phos   Date Value Ref Range Status   05/05/2023 89 55 - 142 U/L Final     AST   Date Value Ref Range Status   05/05/2023 39 (H) 15 - 37 U/L Final     ALT   Date Value Ref Range Status   05/05/2023 55 13 - 56 U/L Final     Anion Gap   Date Value Ref Range Status   05/05/2023 7 7 - 16 mmol/L Final     eGFR   Date Value Ref Range Status   05/05/2023 77 >=60 mL/min/1.73m2 Final     Procedures   Assessment and Plan (including Health Maintenance)   :    Plan:           Problem List Items Addressed This Visit          Hematology    Idiopathic thrombocytopenic purpura - Primary    Current Assessment & Plan     CBC obtained and with Hgb/Hct 9.2/30.5 with platelet count of 28.  Her last CBC was 2 weeks ago and Hgb/Hct was 8.0/26.6 at that time with platelet count of 118.  Contacted Dr Hubbard at [facility] and apparently patient was supposed to have an appt there today that she did not show up for. Dr Hubbard was notified of results and instructed to give Decadron 40mg PO daily x 4 days with order clarification read back twice.   Contacted patient and notified of new orders. She states she will  tomorrow and start taking.   Follow up with Dr Hubbard on June 1st at 11:00.            Relevant Medications    dexAMETHasone (DECADRON) 4 MG Tab    Other Relevant Orders    POCT hemoglobin    CBC Auto Differential (Completed)       Health Maintenance Topics with due status: Not Due       Topic Last Completion Date    Lipid Panel 02/12/2023    Colorectal Cancer Screening 02/13/2023    Hemoglobin A1c (Diabetic Prevention Screening) 02/20/2023    Mammogram 03/20/2023    Influenza Vaccine Not Due       Future Appointments   Date Time Provider Department Center   6/21/2023  3:30 PM [facility] [place]   6/28/2023  9:00 AM Papa Melton, DO [facility] SLEEP [place]   3/25/2024 12:45 PM [facility]        Health Maintenance Due    Topic Date Due    DEXA Scan  Never done        No follow-ups on file.     Signature:  Kristyn Brito NP  [address] [place]  [address]    Date of encounter: 5/22/23

## 2023-05-22 NOTE — ASSESSMENT & PLAN NOTE
CBC obtained and with Hgb/Hct 9.2/30.5 with platelet count of 28.  Her last CBC was 2 weeks ago and Hgb/Hct was 8.0/26.6 at that time with platelet count of 118.  Contacted Dr Hubbard at [facility] and apparently patient was supposed to have an appt there today that she did not show up for. Dr Hubbard was notified of results and instructed to give Decadron 40mg PO daily x 4 days with order clarification read back twice.   Contacted patient and notified of new orders. She states she will  tomorrow and start taking.   Follow up with Dr Hubbard on June 1st at 11:00.

## 2023-05-30 NOTE — ED PROVIDER NOTES
Encounter Date: 2023       History     Chief Complaint   Patient presents with    Abnormal Lab     Patient presents to the ER referred by her primary care provider Dr. Ravi.  Dr. Ravi referred her to the ER for low H&H.  He recommends patient to have a blood transfusion.  Patient attempted to get transfusion outpatient at [facility] but was [place] was unable to do the transfusion.  Patient reports weakness and dizziness.  She reports she was hospitalized in February and had a GI workup related to intestinal blood loss.  She states symptoms resolved at that time.  She denies chest pain.  She reports occasional shortness of breath with exertion.  Patient does not desire to be admitted to the hospital.  She is responsible for total care of a disabled relative.    The history is provided by the patient. No  was used.   Review of patient's allergies indicates:   Allergen Reactions    Lisinopril Other (See Comments)     thrombocytopenia     Past Medical History:   Diagnosis Date    Anxiety and depression     Cataract     Encounter for blood transfusion 2023    [facility]    ETOH abuse 10/24/2022    GERD (gastroesophageal reflux disease)     History of alcohol abuse     Hyperlipidemia     Hypertension     Hypothyroidism     Idiopathic thrombocytopenic purpura     Insomnia 2022    Iron deficiency anemia 2023    Left bundle branch block     Osteoporosis     Pernicious anemia      Past Surgical History:   Procedure Laterality Date    CATARACT EXTRACTION Bilateral      SECTION      COLONOSCOPY  2023    diagnostic to due blood loss-Dr. Tran-repeat in 1 year    ESOPHAGOGASTRODUODENOSCOPY  2023    HYSTERECTOMY  2003    OOPHORECTOMY      THYROIDECTOMY       Family History   Problem Relation Age of Onset    Lung cancer Father     Osteoporosis Mother     No Known Problems Sister     No Known Problems Brother     Lupus Daughter     No Known Problems Son   "   No Known Problems Maternal Grandmother     No Known Problems Maternal Grandfather     No Known Problems Paternal Grandmother     No Known Problems Paternal Grandfather      Social History     Tobacco Use    Smoking status: Every Day     Packs/day: 0.50     Years: 33.00     Pack years: 16.50     Types: Vaping with nicotine, Cigarettes     Start date:      Last attempt to quit:      Years since quittin.4     Passive exposure: Past    Smokeless tobacco: Never    Tobacco comments:     Former cigarette smoker, smoked "off and on" from [id number]. Quit in  then picked up vaping in .    Substance Use Topics    Alcohol use: Yes     Comment: 2 cans of mixed drinks almost every night    Drug use: Never     Review of Systems   Constitutional:  Positive for activity change and fatigue.   Respiratory:  Positive for shortness of breath.    Neurological:  Positive for dizziness, weakness and light-headedness.   All other systems reviewed and are negative.    Physical Exam     Initial Vitals   BP Pulse Resp Temp SpO2   23 1401 23 1401 23 1401 23 1445 23 1401   (!) 110/37 96 18 98.4 °F (36.9 °C) 99 %      MAP       --                Physical Exam    Nursing note and vitals reviewed.  Constitutional: Vital signs are normal. She appears well-developed and well-nourished. She is cooperative.   HENT:   Head: Normocephalic.   Right Ear: External ear normal.   Left Ear: External ear normal.   Nose: Nose normal.   Mouth/Throat: Uvula is midline and oropharynx is clear and moist. Mucous membranes are pale.   Eyes: Conjunctivae and EOM are normal. Pupils are equal, round, and reactive to light.   Neck: Neck supple.   Normal range of motion.  Cardiovascular:  Normal rate, regular rhythm, normal heart sounds and intact distal pulses.           Pulmonary/Chest: Breath sounds normal.   Abdominal: Abdomen is soft. Bowel sounds are normal.   Musculoskeletal:         General: Normal range of " motion.      Cervical back: Normal range of motion and neck supple.     Neurological: She is alert and oriented to person, place, and time. She has normal strength. GCS score is 15. GCS eye subscore is 4. GCS verbal subscore is 5. GCS motor subscore is 6.   Skin: Skin is warm, dry and intact. Capillary refill takes less than 2 seconds.   Psychiatric: She has a normal mood and affect. Her behavior is normal. Judgment and thought content normal.       Medical Screening Exam   See Full Note    ED Course   Procedures  Labs Reviewed   COMPREHENSIVE METABOLIC PANEL - Abnormal; Notable for the following components:       Result Value    Glucose 109 (*)     Creatinine 1.05 (*)     Calcium 8.3 (*)     Albumin 3.2 (*)     ALT 66 (*)     AST 47 (*)     eGFR 58 (*)     All other components within normal limits   CBC WITH DIFFERENTIAL - Abnormal; Notable for the following components:    RBC 2.56 (*)     Hemoglobin 5.8 (*)     Hematocrit 20.4 (*)     MCV 79.7 (*)     MCH 22.7 (*)     MCHC 28.4 (*)     RDW 20.8 (*)     Platelet Count 113 (*)     Lymphocytes % 26.9 (*)     Monocytes % 12.2 (*)     nRBC, Auto 0.3 (*)     Monocytes, Absolute 0.82 (*)     nRBC, Absolute 0.02 (*)     All other components within normal limits   APTT - Normal   PROTIME-INR - Normal   CBC W/ AUTO DIFFERENTIAL    Narrative:     The following orders were created for panel order CBC auto differential.  Procedure                               Abnormality         Status                     ---------                               -----------         ------                     CBC with Differential[[id number]]        Abnormal            Final result               Manual Differential[[id number]]                                                           Please view results for these tests on the individual orders.   TYPE & SCREEN   POCT OCCULT BLOOD (STOOL)   PREPARE RBC SOFT   PREPARE RBC SOFT        ECG Results              EKG 12-lead (Final result)  Result time  04/30/23 12:41:45      Final result by Interface, Lab In [facility] (04/30/23 12:41:45)                   Narrative:    Test Reason : D64.9,    Vent. Rate : 072 BPM     Atrial Rate : 000 BPM     P-R Int : 172 ms          QRS Dur : 142 ms      QT Int : 454 ms       P-R-T Axes : 063 099 034 degrees     QTc Int : 475 ms    Sinus rhythm  Rightward axis  IV conduction defect  Inferior/lateral ST-T abnormality may be due to myocardial ischemia  Abnormal ECG    Confirmed by Honorio Caban MD ([id number]) on 4/30/2023 12:41:37 PM    Referred By: FELI   SELF           Confirmed By:Honorio Caban MD                                  Imaging Results    None          Medications - No data to display  Medical Decision Making:   Initial Assessment:   Patient presents to the ER referred by her primary care provider Dr. Ravi.  Dr. Ravi referred her to the ER for low H&H.  He recommends patient to have a blood transfusion.  Patient attempted to get transfusion outpatient at [facility] but was [place] was unable to do the transfusion.  Patient reports weakness and dizziness.  She reports she was hospitalized in February and had a GI workup related to intestinal blood loss.  She states symptoms resolved at that time.  She denies chest pain.  She reports occasional shortness of breath with exertion.  Patient does not desire to be admitted to the hospital.  She is responsible for total care of a disabled relative.    Clinical Tests:   Lab Tests: Ordered and Reviewed  The following lab test(s) were unremarkable: CBC and CMP       <> Summary of Lab: H/H 5/20  Medical Tests: Ordered and Reviewed  ED Management:  EKG sinus rhythm with a rate of 72 beats per minute    Initiate IV collect lab work collect type and screen  Transfuse 2 units packed red blood cells in ER.    Patient verbalized improvement of her symptoms and desires discharge    Patient discharged home with diagnosis of symptomatic anemia.  She was instructed to follow up  with her primary care provider in 24-48 hours for recheck.  She was also instructed to keep her scheduled appointment to follow up with GI.  Patient verbalizes understanding and agrees with plan of care.                       Clinical Impression:   Final diagnoses:  [D64.9] Symptomatic anemia (Primary)        ED Disposition Condition    Discharge Stable          ED Prescriptions    None       Follow-up Information       Follow up With Specialties Details Why Contact Info    Dagoberto Ravi,  Family Medicine Schedule an appointment as soon as possible for a visit in 2 days for recheck. [address]  [facility] [place] [id number] [phone]               ANA Gonzalez  05/30/23 0013

## 2023-06-15 ENCOUNTER — OFFICE VISIT (OUTPATIENT)
Dept: FAMILY MEDICINE | Facility: CLINIC | Age: 68
End: 2023-06-15
Payer: COMMERCIAL

## 2023-06-15 VITALS
DIASTOLIC BLOOD PRESSURE: 80 MMHG | HEART RATE: 70 BPM | BODY MASS INDEX: 32.78 KG/M2 | OXYGEN SATURATION: 97 % | SYSTOLIC BLOOD PRESSURE: 136 MMHG | HEIGHT: 64 IN | WEIGHT: 192 LBS | TEMPERATURE: 98 F | RESPIRATION RATE: 18 BRPM

## 2023-06-15 DIAGNOSIS — F32.A DEPRESSION, UNSPECIFIED DEPRESSION TYPE: Primary | ICD-10-CM

## 2023-06-15 DIAGNOSIS — D50.9 IRON DEFICIENCY ANEMIA, UNSPECIFIED IRON DEFICIENCY ANEMIA TYPE: ICD-10-CM

## 2023-06-15 DIAGNOSIS — B88.9: ICD-10-CM

## 2023-06-15 DIAGNOSIS — K21.9 GASTROESOPHAGEAL REFLUX DISEASE, UNSPECIFIED WHETHER ESOPHAGITIS PRESENT: ICD-10-CM

## 2023-06-15 LAB
ANISOCYTOSIS BLD QL SMEAR: ABNORMAL
BASOPHILS # BLD AUTO: 0.07 K/UL (ref 0–0.2)
BASOPHILS NFR BLD AUTO: 0.9 % (ref 0–1)
DIFFERENTIAL METHOD BLD: ABNORMAL
EOSINOPHIL # BLD AUTO: 0.16 K/UL (ref 0–0.5)
EOSINOPHIL NFR BLD AUTO: 2 % (ref 1–4)
ERYTHROCYTE [DISTWIDTH] IN BLOOD BY AUTOMATED COUNT: 20.7 % (ref 11.5–14.5)
HCT VFR BLD AUTO: 23.8 % (ref 38–47)
HGB BLD-MCNC: 6.7 G/DL (ref 12–16)
HYPOCHROMIA BLD QL SMEAR: ABNORMAL
IMM GRANULOCYTES # BLD AUTO: 0.04 K/UL (ref 0–0.04)
IMM GRANULOCYTES NFR BLD: 0.5 % (ref 0–0.4)
LYMPHOCYTES # BLD AUTO: 1.61 K/UL (ref 1–4.8)
LYMPHOCYTES NFR BLD AUTO: 20.4 % (ref 27–41)
MCH RBC QN AUTO: 22.1 PG (ref 27–31)
MCHC RBC AUTO-ENTMCNC: 28.2 G/DL (ref 32–36)
MCV RBC AUTO: 78.5 FL (ref 80–96)
MONOCYTES # BLD AUTO: 0.82 K/UL (ref 0–0.8)
MONOCYTES NFR BLD AUTO: 10.4 % (ref 2–6)
MPC BLD CALC-MCNC: ABNORMAL G/DL
NEUTROPHILS # BLD AUTO: 5.18 K/UL (ref 1.8–7.7)
NEUTROPHILS NFR BLD AUTO: 65.8 % (ref 53–65)
NRBC # BLD AUTO: 0.02 X10E3/UL
NRBC, AUTO (.00): 0.3 %
PLATELET # BLD AUTO: 93 K/UL (ref 150–400)
PLATELET MORPHOLOGY: ABNORMAL
POLYCHROMASIA BLD QL SMEAR: ABNORMAL
RBC # BLD AUTO: 3.03 M/UL (ref 4.2–5.4)
WBC # BLD AUTO: 7.88 K/UL (ref 4.5–11)

## 2023-06-15 PROCEDURE — 4010F PR ACE/ARB THEARPY RXD/TAKEN: ICD-10-PCS | Mod: ,,,

## 2023-06-15 PROCEDURE — 3008F PR BODY MASS INDEX (BMI) DOCUMENTED: ICD-10-PCS | Mod: ,,,

## 2023-06-15 PROCEDURE — 4010F ACE/ARB THERAPY RXD/TAKEN: CPT | Mod: ,,,

## 2023-06-15 PROCEDURE — 3008F BODY MASS INDEX DOCD: CPT | Mod: ,,,

## 2023-06-15 PROCEDURE — 3288F PR FALLS RISK ASSESSMENT DOCUMENTED: ICD-10-PCS | Mod: ,,,

## 2023-06-15 PROCEDURE — 1100F PTFALLS ASSESS-DOCD GE2>/YR: CPT | Mod: ,,,

## 2023-06-15 PROCEDURE — 3075F PR MOST RECENT SYSTOLIC BLOOD PRESS GE 130-139MM HG: ICD-10-PCS | Mod: ,,,

## 2023-06-15 PROCEDURE — 3044F HG A1C LEVEL LT 7.0%: CPT | Mod: ,,,

## 2023-06-15 PROCEDURE — 1160F RVW MEDS BY RX/DR IN RCRD: CPT | Mod: ,,,

## 2023-06-15 PROCEDURE — 1160F PR REVIEW ALL MEDS BY PRESCRIBER/CLIN PHARMACIST DOCUMENTED: ICD-10-PCS | Mod: ,,,

## 2023-06-15 PROCEDURE — 3075F SYST BP GE 130 - 139MM HG: CPT | Mod: ,,,

## 2023-06-15 PROCEDURE — 99213 OFFICE O/P EST LOW 20 MIN: CPT | Mod: ,,,

## 2023-06-15 PROCEDURE — 1126F PR PAIN SEVERITY QUANTIFIED, NO PAIN PRESENT: ICD-10-PCS | Mod: ,,,

## 2023-06-15 PROCEDURE — 1159F PR MEDICATION LIST DOCUMENTED IN MEDICAL RECORD: ICD-10-PCS | Mod: ,,,

## 2023-06-15 PROCEDURE — 1126F AMNT PAIN NOTED NONE PRSNT: CPT | Mod: ,,,

## 2023-06-15 PROCEDURE — 3288F FALL RISK ASSESSMENT DOCD: CPT | Mod: ,,,

## 2023-06-15 PROCEDURE — 99213 PR OFFICE/OUTPT VISIT, EST, LEVL III, 20-29 MIN: ICD-10-PCS | Mod: ,,,

## 2023-06-15 PROCEDURE — 3079F PR MOST RECENT DIASTOLIC BLOOD PRESSURE 80-89 MM HG: ICD-10-PCS | Mod: ,,,

## 2023-06-15 PROCEDURE — 85025 CBC WITH DIFFERENTIAL: ICD-10-PCS | Mod: ,,, | Performed by: CLINICAL MEDICAL LABORATORY

## 2023-06-15 PROCEDURE — 85025 COMPLETE CBC W/AUTO DIFF WBC: CPT | Mod: ,,, | Performed by: CLINICAL MEDICAL LABORATORY

## 2023-06-15 PROCEDURE — 3044F PR MOST RECENT HEMOGLOBIN A1C LEVEL <7.0%: ICD-10-PCS | Mod: ,,,

## 2023-06-15 PROCEDURE — 1159F MED LIST DOCD IN RCRD: CPT | Mod: ,,,

## 2023-06-15 PROCEDURE — 1100F PR PT FALLS ASSESS DOC 2+ FALLS/FALL W/INJURY/YR: ICD-10-PCS | Mod: ,,,

## 2023-06-15 PROCEDURE — 3079F DIAST BP 80-89 MM HG: CPT | Mod: ,,,

## 2023-06-15 RX ORDER — PANTOPRAZOLE SODIUM 40 MG/1
40 TABLET, DELAYED RELEASE ORAL 2 TIMES DAILY
Qty: 30 TABLET | Refills: 5 | Status: SHIPPED | OUTPATIENT
Start: 2023-06-15 | End: 2023-11-30 | Stop reason: SDUPTHER

## 2023-06-15 RX ORDER — MUPIROCIN 20 MG/G
OINTMENT TOPICAL 3 TIMES DAILY
Qty: 30 G | Refills: 0 | Status: ON HOLD | OUTPATIENT
Start: 2023-06-15 | End: 2023-06-23 | Stop reason: HOSPADM

## 2023-06-15 RX ORDER — VORTIOXETINE 10 MG/1
1 TABLET, FILM COATED ORAL DAILY
Qty: 30 TABLET | Refills: 1 | Status: SHIPPED | OUTPATIENT
Start: 2023-06-15 | End: 2023-11-02 | Stop reason: SDUPTHER

## 2023-06-15 NOTE — PROGRESS NOTES
ANA LIANG   [place] [address] [facility] [address]  [place]  [address]      PATIENT NAME: Ene Martin  : 1955  DATE: 6/15/23  MRN: [id number]      Billing Provider: ANA LIANG  Level of Service:   Patient PCP Information       Provider PCP Type    Dagoberto Ravi DO General            Reason for Visit / Chief Complaint: Tick Removal (Tick removal from right hip. ) and Medication Refill (Patient needs medication refills and labs.)         History of Present Illness / Problem Focused Workflow     Ene Martin presents to the clinic with Tick Removal (Tick removal from right hip. ) and Medication Refill (Patient needs medication refills and labs.)     68 y/o female presents to clinic for tick removal from right groin. She also needs medication refill and CBC drawn today.    Medication Refill  Pertinent negatives include no abdominal pain, change in bowel habit, chest pain, chills, congestion, coughing, fever, headaches, joint swelling, nausea, neck pain, numbness, sore throat, vertigo, vomiting or weakness.     Review of Systems     @Review of Systems   Constitutional:  Negative for chills, fever and unexpected weight change.   HENT:  Negative for nasal congestion, ear pain, sinus pressure/congestion and sore throat.    Eyes:  Negative for pain.   Respiratory:  Negative for cough, chest tightness, shortness of breath and wheezing.    Cardiovascular:  Negative for chest pain and palpitations.   Gastrointestinal:  Negative for abdominal pain, blood in stool, change in bowel habit, nausea, vomiting and change in bowel habit.   Endocrine: Negative for polydipsia, polyphagia and polyuria.   Genitourinary:  Negative for difficulty urinating, dysuria, frequency and hematuria.   Musculoskeletal:  Negative for back pain, joint swelling and neck pain.   Neurological:  Negative for dizziness, vertigo, seizures, weakness, light-headedness, numbness and headaches.    Psychiatric/Behavioral:  Negative for suicidal ideas.      Medical / Social / Family History     Past Medical History:   Diagnosis Date    Anxiety and depression     Cataract     Encounter for blood transfusion 2023    [facility]    ETOH abuse 10/24/2022    GERD (gastroesophageal reflux disease)     History of alcohol abuse     Hyperlipidemia     Hypertension     Hypothyroidism     Idiopathic thrombocytopenic purpura     Insomnia 2022    Iron deficiency anemia 2023    Left bundle branch block     Osteoporosis     Pernicious anemia        Past Surgical History:   Procedure Laterality Date    CATARACT EXTRACTION Bilateral      SECTION      COLONOSCOPY  2023    diagnostic to due blood loss-Dr. Tran-repeat in 1 year    ESOPHAGOGASTRODUODENOSCOPY  2023    HYSTERECTOMY      OOPHORECTOMY      THYROIDECTOMY         Social History  Ms.  reports that she has been smoking vaping with nicotine and cigarettes. She started smoking about 51 years ago. She has a 16.50 pack-year smoking history. She has been exposed to tobacco smoke. She has never used smokeless tobacco. She reports current alcohol use. She reports that she does not use drugs.    Family History  Ms.'s family history includes Lung cancer in her father; Lupus in her daughter; No Known Problems in her brother, maternal grandfather, maternal grandmother, paternal grandfather, paternal grandmother, sister, and son; Osteoporosis in her mother.    Medications and Allergies     Medications  Outpatient Medications Marked as Taking for the 6/15/23 encounter (Office Visit) with ANA Noble   Medication Sig Dispense Refill    BYSTOLIC 5 mg Tab Take 5 mg by mouth once daily.      calcium carbonate-vitamin D3 500 mg-3.125 mcg (125 unit) per tablet Take 1 tablet by mouth once daily.      cholecalciferol, vitamin D3, 125 mcg (5,000 unit) Tab Take 5,000 Units by mouth once daily.      ENTRESTO 24-26 mg per tablet Take  1 tablet by mouth 2 (two) times daily.      furosemide (LASIX) 20 MG tablet Take 2 tablets (40 mg total) by mouth once daily. 30 tablet 5    potassium chloride (KLOR-CON) 10 MEQ TbSR Take 1 tablet (10 mEq total) by mouth once daily. 30 tablet 5    SYNTHROID 125 mcg tablet Take 1 tablet (125 mcg total) by mouth once daily. 30 tablet 5    traZODone (DESYREL) 100 MG tablet Take 1 tablet (100 mg total) by mouth every evening. 30 tablet 11    [DISCONTINUED] pantoprazole (PROTONIX) 40 MG tablet Take 1 tablet (40 mg total) by mouth 2 (two) times daily. for 14 days 28 tablet 0    [DISCONTINUED] TRINTELLIX 10 mg Tab TAKE 1 TABLET BY MOUTH DAILY. 30 tablet 1       Allergies  Review of patient's allergies indicates:   Allergen Reactions    Lisinopril Other (See Comments)     thrombocytopenia       Physical Examination     Vitals:    06/15/23 1326   BP: 136/80   Pulse: 70   Resp: 18   Temp: 97.9 °F (36.6 °C)     Physical Exam  Vitals and nursing note reviewed.   Constitutional:       General: She is not in acute distress.     Appearance: Normal appearance. She is obese.   HENT:      Head: Normocephalic.   Neck:      Vascular: No carotid bruit.   Cardiovascular:      Rate and Rhythm: Normal rate and regular rhythm.      Heart sounds: Normal heart sounds, S1 normal and S2 normal. No murmur heard.    No friction rub. No gallop.   Pulmonary:      Effort: Pulmonary effort is normal. No respiratory distress.      Breath sounds: Normal breath sounds. No decreased breath sounds, wheezing, rhonchi or rales.   Abdominal:      General: Bowel sounds are normal.      Palpations: Abdomen is soft.   Musculoskeletal:         General: No swelling or tenderness. Normal range of motion.      Cervical back: Normal range of motion and neck supple.   Skin:     General: Skin is warm and dry.      Capillary Refill: Capillary refill takes less than 2 seconds.      Comments: Small tick noted to right groin   Neurological:      Mental Status: She is  alert and oriented to person, place, and time.   Psychiatric:         Attention and Perception: Attention normal.         Mood and Affect: Mood normal.         Behavior: Behavior normal. Behavior is cooperative.             Lab Results   Component Value Date    WBC 7.88 06/15/2023    HGB 6.7 (L) 06/15/2023    HCT 23.8 (L) 06/15/2023    MCV 78.5 (L) 06/15/2023    PLT 93 (L) 06/15/2023        CMP  Sodium   Date Value Ref Range Status   05/05/2023 138 136 - 145 mmol/L Final     Potassium   Date Value Ref Range Status   05/05/2023 3.8 3.5 - 5.1 mmol/L Final     Chloride   Date Value Ref Range Status   05/05/2023 105 98 - 107 mmol/L Final     CO2   Date Value Ref Range Status   05/05/2023 30 21 - 32 mmol/L Final     Glucose   Date Value Ref Range Status   05/05/2023 96 74 - 106 mg/dL Final     BUN   Date Value Ref Range Status   05/05/2023 8 7 - 18 mg/dL Final     Creatinine   Date Value Ref Range Status   05/05/2023 0.83 0.55 - 1.02 mg/dL Final     Calcium   Date Value Ref Range Status   05/05/2023 8.6 8.5 - 10.1 mg/dL Final     Total Protein   Date Value Ref Range Status   05/05/2023 7.1 6.4 - 8.2 g/dL Final     Albumin   Date Value Ref Range Status   05/05/2023 3.6 3.5 - 5.0 g/dL Final     Bilirubin, Total   Date Value Ref Range Status   05/05/2023 0.4 >0.0 - 1.2 mg/dL Final     Alk Phos   Date Value Ref Range Status   05/05/2023 89 55 - 142 U/L Final     AST   Date Value Ref Range Status   05/05/2023 39 (H) 15 - 37 U/L Final     ALT   Date Value Ref Range Status   05/05/2023 55 13 - 56 U/L Final     Anion Gap   Date Value Ref Range Status   05/05/2023 7 7 - 16 mmol/L Final     eGFR   Date Value Ref Range Status   05/05/2023 77 >=60 mL/min/1.73m2 Final     Procedures   Assessment and Plan (including Health Maintenance)   :    Plan:           Problem List Items Addressed This Visit          Psychiatric    Depression - Primary    Current Assessment & Plan     Depression well controlled. Continue current medication           Relevant Medications    vortioxetine (TRINTELLIX) 10 mg Tab       ID    Disorder of skin due to parasite    Current Assessment & Plan     Small tick removed from right groin. Mupirocin ointment RX.       Reviewed pathology of current symptoms, treatment plan, medication side effects/risk/benefits/directions on taking medications, instructed to keep area clean and dry, discussed  worsening symptoms to return to clinic or if after hours to go to ER, patient will be called with lab results and treatment plan changed accordingly. Patient verbalized understanding of treatment plan and denies any questions.         Relevant Medications    mupirocin (BACTROBAN) 2 % ointment       Oncology    Iron deficiency anemia    Current Assessment & Plan     CBC today and will call pt with results and any new orders         Relevant Orders    CBC Auto Differential (Completed)    CBC Morphology (Completed)       GI    Gastroesophageal reflux disease    Current Assessment & Plan      Reviewed pathology of current symptoms, medication side effects/risk/benefits/directions on taking medications, and to take medication as prescribed. Take medication as prescribed. Remain upright for at least one hour after eating or drinking, raise HOB 6-8 inches. Eat small, frequent bland meals instead of large meals. Avoid greasy/spicy/acidic foods. Avoid alcohol and NSAIDS. Monitor for changes in bowel color, texture, etc. Discussed what warrants emergent follow-up or treatment. Patient is to go to ED if they begin vomiting and it looks like blood or coffee grounds. Weight loss may help people who are overweight to reduce acid reflux. Weight loss has a number of other health benefits including decreases risk of heart disease and type 2 DM.         Relevant Medications    pantoprazole (PROTONIX) 40 MG tablet       Health Maintenance Topics with due status: Not Due       Topic Last Completion Date    Lipid Panel 02/12/2023    Colorectal Cancer  Screening 02/13/2023    Hemoglobin A1c (Diabetic Prevention Screening) 02/20/2023    Mammogram 03/20/2023    Influenza Vaccine Not Due       Future Appointments   Date Time Provider Department Center   6/21/2023  3:30 PM Dagoberto Ravi, DO [facility] FAMMED [place]   6/28/2023  9:00 AM Papa Melton DO [facility] SLEEP [place]   3/25/2024 12:45 PM [facility] [place]        Health Maintenance Due   Topic Date Due    DEXA Scan  Never done        No follow-ups on file.     Signature:  ANA LIANG  [facility]  [id number] [address] [place]  [address]    Date of encounter: 6/15/23   Reviewed the chart and agree with assessment and plan.  Dagoberto Ravi, DO

## 2023-06-19 ENCOUNTER — LAB VISIT (OUTPATIENT)
Dept: LAB | Facility: HOSPITAL | Age: 68
End: 2023-06-19
Attending: FAMILY MEDICINE
Payer: COMMERCIAL

## 2023-06-19 DIAGNOSIS — D50.9 IRON DEFICIENCY ANEMIA, UNSPECIFIED IRON DEFICIENCY ANEMIA TYPE: Primary | ICD-10-CM

## 2023-06-19 DIAGNOSIS — D64.9 ANEMIA, UNSPECIFIED TYPE: Primary | ICD-10-CM

## 2023-06-19 DIAGNOSIS — D64.9 SYMPTOMATIC ANEMIA: ICD-10-CM

## 2023-06-19 DIAGNOSIS — D64.9 SYMPTOMATIC ANEMIA: Primary | ICD-10-CM

## 2023-06-19 PROCEDURE — 86900 BLOOD TYPING SEROLOGIC ABO: CPT | Performed by: FAMILY MEDICINE

## 2023-06-19 PROCEDURE — 36415 COLL VENOUS BLD VENIPUNCTURE: CPT

## 2023-06-19 PROCEDURE — 86870 RBC ANTIBODY IDENTIFICATION: CPT | Performed by: FAMILY MEDICINE

## 2023-06-19 PROCEDURE — 86850 RBC ANTIBODY SCREEN: CPT | Performed by: FAMILY MEDICINE

## 2023-06-19 PROCEDURE — 86901 BLOOD TYPING SEROLOGIC RH(D): CPT | Performed by: FAMILY MEDICINE

## 2023-06-19 RX ORDER — HYDROCODONE BITARTRATE AND ACETAMINOPHEN 500; 5 MG/1; MG/1
TABLET ORAL
Status: CANCELLED | OUTPATIENT
Start: 2023-06-19

## 2023-06-20 ENCOUNTER — TELEPHONE (OUTPATIENT)
Dept: FAMILY MEDICINE | Facility: CLINIC | Age: 68
End: 2023-06-20
Payer: COMMERCIAL

## 2023-06-20 DIAGNOSIS — D69.3 IDIOPATHIC THROMBOCYTOPENIC PURPURA: Primary | ICD-10-CM

## 2023-06-20 PROBLEM — K21.9 GASTROESOPHAGEAL REFLUX DISEASE: Status: ACTIVE | Noted: 2023-06-20

## 2023-06-20 PROBLEM — B88.9: Status: ACTIVE | Noted: 2023-06-20

## 2023-06-20 LAB
GROUP & RH: NORMAL
INDIRECT COOMBS GEL: POSITIVE
SPECIMEN OUTDATE: NORMAL

## 2023-06-20 NOTE — ASSESSMENT & PLAN NOTE
Small tick removed from right groin. Mupirocin ointment RX.       Reviewed pathology of current symptoms, treatment plan, medication side effects/risk/benefits/directions on taking medications, instructed to keep area clean and dry, discussed  worsening symptoms to return to clinic or if after hours to go to ER, patient will be called with lab results and treatment plan changed accordingly. Patient verbalized understanding of treatment plan and denies any questions.

## 2023-06-20 NOTE — TELEPHONE ENCOUNTER
Received order from Dr. Ravi to contact the Pt and let her know that there are no O Positive blood products available in this area to transfuse to her at this time. Dr. Ravi has been in contact with the Pt's hematologist Dr. Hubbard, but they do not have any O positive blood products available either. Dr. Ravi stated that he has been in contact with [facility] and is waiting to hear back about getting the Pt in over there. In the meantime the Pt is to go to [facility] as soon as possible to have additional labs drawn: LDH, Reticulocyte count, haptoglobin, and billirubin. Called and notified Pt of all of this information, she voiced understanding.

## 2023-06-20 NOTE — ASSESSMENT & PLAN NOTE
Reviewed pathology of current symptoms, medication side effects/risk/benefits/directions on taking medications, and to take medication as prescribed. Take medication as prescribed. Remain upright for at least one hour after eating or drinking, raise HOB 6-8 inches. Eat small, frequent bland meals instead of large meals. Avoid greasy/spicy/acidic foods. Avoid alcohol and NSAIDS. Monitor for changes in bowel color, texture, etc. Discussed what warrants emergent follow-up or treatment. Patient is to go to ED if they begin vomiting and it looks like blood or coffee grounds. Weight loss may help people who are overweight to reduce acid reflux. Weight loss has a number of other health benefits including decreases risk of heart disease and type 2 DM.

## 2023-06-21 ENCOUNTER — TELEPHONE (OUTPATIENT)
Dept: FAMILY MEDICINE | Facility: CLINIC | Age: 68
End: 2023-06-21
Payer: COMMERCIAL

## 2023-06-21 ENCOUNTER — HOSPITAL ENCOUNTER (INPATIENT)
Facility: HOSPITAL | Age: 68
LOS: 2 days | Discharge: HOME | DRG: 812 | End: 2023-06-23
Attending: INTERNAL MEDICINE | Admitting: INTERNAL MEDICINE
Payer: COMMERCIAL

## 2023-06-21 DIAGNOSIS — R07.9 CHEST PAIN: ICD-10-CM

## 2023-06-21 DIAGNOSIS — R42 DIZZINESS: ICD-10-CM

## 2023-06-21 DIAGNOSIS — D64.9 SYMPTOMATIC ANEMIA: Primary | ICD-10-CM

## 2023-06-21 DIAGNOSIS — R53.1 WEAKNESS: ICD-10-CM

## 2023-06-21 DIAGNOSIS — I10 PRIMARY HYPERTENSION: ICD-10-CM

## 2023-06-21 DIAGNOSIS — D64.9 ANEMIA, UNSPECIFIED TYPE: ICD-10-CM

## 2023-06-21 DIAGNOSIS — N17.9 AKI (ACUTE KIDNEY INJURY): ICD-10-CM

## 2023-06-21 PROBLEM — I42.9 CARDIOMYOPATHY: Status: ACTIVE | Noted: 2023-06-21

## 2023-06-21 PROBLEM — F10.10 ETOH ABUSE: Status: RESOLVED | Noted: 2022-10-24 | Resolved: 2023-06-21

## 2023-06-21 LAB
ALBUMIN SERPL BCP-MCNC: 3 G/DL (ref 3.5–5)
ALBUMIN/GLOB SERPL: 0.8 {RATIO}
ALP SERPL-CCNC: 78 U/L (ref 55–142)
ALT SERPL W P-5'-P-CCNC: 65 U/L (ref 13–56)
ANION GAP SERPL CALCULATED.3IONS-SCNC: 11 MMOL/L (ref 7–16)
ANISOCYTOSIS BLD QL SMEAR: ABNORMAL
ANTIBODY IDENTIFICATION: NORMAL
APTT PPP: 33.8 SECONDS (ref 25.2–37.3)
AST SERPL W P-5'-P-CCNC: 79 U/L (ref 15–37)
BACTERIA #/AREA URNS HPF: ABNORMAL /HPF
BASOPHILS # BLD AUTO: 0.06 K/UL (ref 0–0.2)
BASOPHILS NFR BLD AUTO: 0.7 % (ref 0–1)
BILIRUB SERPL-MCNC: 0.4 MG/DL (ref ?–1.2)
BILIRUB UR QL STRIP: NEGATIVE
BUN SERPL-MCNC: 11 MG/DL (ref 7–18)
BUN/CREAT SERPL: 9 (ref 6–20)
CALCIUM SERPL-MCNC: 8.6 MG/DL (ref 8.5–10.1)
CHLORIDE SERPL-SCNC: 101 MMOL/L (ref 98–107)
CLARITY UR: ABNORMAL
CO2 SERPL-SCNC: 28 MMOL/L (ref 21–32)
COLOR UR: ABNORMAL
CREAT SERPL-MCNC: 1.23 MG/DL (ref 0.55–1.02)
DIFFERENTIAL METHOD BLD: ABNORMAL
EGFR (NO RACE VARIABLE) (RUSH/TITUS): 48 ML/MIN/1.73M2
EOSINOPHIL # BLD AUTO: 0.1 K/UL (ref 0–0.5)
EOSINOPHIL NFR BLD AUTO: 1.2 % (ref 1–4)
EOSINOPHIL NFR BLD MANUAL: 2 % (ref 1–4)
ERYTHROCYTE [DISTWIDTH] IN BLOOD BY AUTOMATED COUNT: 21 % (ref 11.5–14.5)
GLOBULIN SER-MCNC: 3.6 G/DL (ref 2–4)
GLUCOSE SERPL-MCNC: 128 MG/DL (ref 74–106)
GLUCOSE UR STRIP-MCNC: NORMAL MG/DL
HCT VFR BLD AUTO: 17.5 % (ref 38–47)
HGB BLD-MCNC: 4.9 G/DL (ref 12–16)
HYPOCHROMIA BLD QL SMEAR: ABNORMAL
IMM GRANULOCYTES # BLD AUTO: 0.05 K/UL (ref 0–0.04)
IMM GRANULOCYTES NFR BLD: 0.6 % (ref 0–0.4)
INDIRECT COOMBS: ABNORMAL
INR BLD: 1.15
KETONES UR STRIP-SCNC: NEGATIVE MG/DL
LEUKOCYTE ESTERASE UR QL STRIP: ABNORMAL
LYMPHOCYTES # BLD AUTO: 1.4 K/UL (ref 1–4.8)
LYMPHOCYTES NFR BLD AUTO: 17 % (ref 27–41)
LYMPHOCYTES NFR BLD MANUAL: 24 % (ref 27–41)
MAGNESIUM SERPL-MCNC: 2.2 MG/DL (ref 1.7–2.3)
MCH RBC QN AUTO: 21.3 PG (ref 27–31)
MCHC RBC AUTO-ENTMCNC: 28 G/DL (ref 32–36)
MCV RBC AUTO: 76.1 FL (ref 80–96)
MICROCYTES BLD QL SMEAR: ABNORMAL
MONOCYTES # BLD AUTO: 0.81 K/UL (ref 0–0.8)
MONOCYTES NFR BLD AUTO: 9.8 % (ref 2–6)
MONOCYTES NFR BLD MANUAL: 3 % (ref 2–6)
MPC BLD CALC-MCNC: ABNORMAL G/DL
MUCOUS THREADS #/AREA URNS HPF: ABNORMAL /HPF
NEUTROPHILS # BLD AUTO: 5.83 K/UL (ref 1.8–7.7)
NEUTROPHILS NFR BLD AUTO: 70.7 % (ref 53–65)
NEUTS SEG NFR BLD MANUAL: 71 % (ref 50–62)
NITRITE UR QL STRIP: NEGATIVE
NRBC # BLD AUTO: 0.05 X10E3/UL
NRBC, AUTO (.00): 0.6 %
PH UR STRIP: 5.5 PH UNITS
PLATELET # BLD AUTO: 97 K/UL (ref 150–400)
PLATELET MORPHOLOGY: ABNORMAL
POC OCCULT BLOOD STOOL: NEGATIVE
POLYCHROMASIA BLD QL SMEAR: ABNORMAL
POTASSIUM SERPL-SCNC: 4.2 MMOL/L (ref 3.5–5.1)
PROT SERPL-MCNC: 6.6 G/DL (ref 6.4–8.2)
PROT UR QL STRIP: NEGATIVE
PROTHROMBIN TIME: 14.3 SECONDS (ref 11.7–14.7)
RBC # BLD AUTO: 2.3 M/UL (ref 4.2–5.4)
RBC # UR STRIP: NEGATIVE /UL
RBC #/AREA URNS HPF: ABNORMAL /HPF
RH BLD: ABNORMAL
SARS-COV-2 RDRP RESP QL NAA+PROBE: NEGATIVE
SODIUM SERPL-SCNC: 136 MMOL/L (ref 136–145)
SP GR UR STRIP: 1.01
SPECIMEN OUTDATE: ABNORMAL
SQUAMOUS #/AREA URNS LPF: ABNORMAL /LPF
TARGETS BLD QL SMEAR: ABNORMAL
TRICHOMONAS #/AREA URNS HPF: ABNORMAL /HPF
UROBILINOGEN UR STRIP-ACNC: NORMAL MG/DL
WBC # BLD AUTO: 8.25 K/UL (ref 4.5–11)
WBC #/AREA URNS HPF: ABNORMAL /HPF
YEAST #/AREA URNS HPF: ABNORMAL /HPF

## 2023-06-21 PROCEDURE — 86905 BLOOD TYPING RBC ANTIGENS: CPT | Mod: 91 | Performed by: NURSE PRACTITIONER

## 2023-06-21 PROCEDURE — 99223 PR INITIAL HOSPITAL CARE,LEVL III: ICD-10-PCS | Mod: ,,, | Performed by: INTERNAL MEDICINE

## 2023-06-21 PROCEDURE — 99285 EMERGENCY DEPT VISIT HI MDM: CPT | Mod: ,,, | Performed by: NURSE PRACTITIONER

## 2023-06-21 PROCEDURE — 99285 PR EMERGENCY DEPT VISIT,LEVEL V: ICD-10-PCS | Mod: ,,, | Performed by: NURSE PRACTITIONER

## 2023-06-21 PROCEDURE — 86870 RBC ANTIBODY IDENTIFICATION: CPT | Performed by: NURSE PRACTITIONER

## 2023-06-21 PROCEDURE — 87635 SARS-COV-2 COVID-19 AMP PRB: CPT | Performed by: NURSE PRACTITIONER

## 2023-06-21 PROCEDURE — 83735 ASSAY OF MAGNESIUM: CPT | Performed by: NURSE PRACTITIONER

## 2023-06-21 PROCEDURE — 63600175 PHARM REV CODE 636 W HCPCS: Performed by: NURSE PRACTITIONER

## 2023-06-21 PROCEDURE — 85025 COMPLETE CBC W/AUTO DIFF WBC: CPT | Performed by: NURSE PRACTITIONER

## 2023-06-21 PROCEDURE — 81001 URINALYSIS AUTO W/SCOPE: CPT | Performed by: NURSE PRACTITIONER

## 2023-06-21 PROCEDURE — 86900 BLOOD TYPING SEROLOGIC ABO: CPT | Performed by: NURSE PRACTITIONER

## 2023-06-21 PROCEDURE — 25000003 PHARM REV CODE 250: Performed by: NURSE PRACTITIONER

## 2023-06-21 PROCEDURE — 82272 OCCULT BLD FECES 1-3 TESTS: CPT

## 2023-06-21 PROCEDURE — 85730 THROMBOPLASTIN TIME PARTIAL: CPT | Performed by: NURSE PRACTITIONER

## 2023-06-21 PROCEDURE — 86880 COOMBS TEST DIRECT: CPT | Performed by: INTERNAL MEDICINE

## 2023-06-21 PROCEDURE — 99285 EMERGENCY DEPT VISIT HI MDM: CPT | Mod: 25

## 2023-06-21 PROCEDURE — 86902 BLOOD TYPE ANTIGEN DONOR EA: CPT | Performed by: NURSE PRACTITIONER

## 2023-06-21 PROCEDURE — 80053 COMPREHEN METABOLIC PANEL: CPT | Performed by: NURSE PRACTITIONER

## 2023-06-21 PROCEDURE — 86922 COMPATIBILITY TEST ANTIGLOB: CPT | Performed by: HOSPITALIST

## 2023-06-21 PROCEDURE — 11000001 HC ACUTE MED/SURG PRIVATE ROOM

## 2023-06-21 PROCEDURE — 85610 PROTHROMBIN TIME: CPT | Performed by: NURSE PRACTITIONER

## 2023-06-21 PROCEDURE — 99223 1ST HOSP IP/OBS HIGH 75: CPT | Mod: ,,, | Performed by: INTERNAL MEDICINE

## 2023-06-21 RX ORDER — HEPARIN SODIUM 5000 [USP'U]/ML
5000 INJECTION, SOLUTION INTRAVENOUS; SUBCUTANEOUS EVERY 8 HOURS
Status: DISCONTINUED | OUTPATIENT
Start: 2023-06-21 | End: 2023-06-21

## 2023-06-21 RX ORDER — LEVOTHYROXINE SODIUM 125 UG/1
125 TABLET ORAL DAILY
Status: DISCONTINUED | OUTPATIENT
Start: 2023-06-22 | End: 2023-06-24 | Stop reason: HOSPADM

## 2023-06-21 RX ORDER — FUROSEMIDE 40 MG/1
40 TABLET ORAL DAILY
Status: DISCONTINUED | OUTPATIENT
Start: 2023-06-22 | End: 2023-06-22

## 2023-06-21 RX ORDER — METOPROLOL SUCCINATE 50 MG/1
50 TABLET, EXTENDED RELEASE ORAL DAILY
Status: DISCONTINUED | OUTPATIENT
Start: 2023-06-22 | End: 2023-06-21

## 2023-06-21 RX ORDER — PANTOPRAZOLE SODIUM 40 MG/1
40 TABLET, DELAYED RELEASE ORAL 2 TIMES DAILY
Status: DISCONTINUED | OUTPATIENT
Start: 2023-06-21 | End: 2023-06-22

## 2023-06-21 RX ORDER — GLUCAGON 1 MG
1 KIT INJECTION
Status: DISCONTINUED | OUTPATIENT
Start: 2023-06-21 | End: 2023-06-24 | Stop reason: HOSPADM

## 2023-06-21 RX ORDER — LORAZEPAM 2 MG/ML
1 INJECTION INTRAMUSCULAR
Status: DISCONTINUED | OUTPATIENT
Start: 2023-06-21 | End: 2023-06-24 | Stop reason: HOSPADM

## 2023-06-21 RX ORDER — DEXTROSE 40 %
15 GEL (GRAM) ORAL
Status: DISCONTINUED | OUTPATIENT
Start: 2023-06-21 | End: 2023-06-24 | Stop reason: HOSPADM

## 2023-06-21 RX ORDER — ONDANSETRON 4 MG/1
8 TABLET, ORALLY DISINTEGRATING ORAL EVERY 8 HOURS PRN
Status: DISCONTINUED | OUTPATIENT
Start: 2023-06-21 | End: 2023-06-24 | Stop reason: HOSPADM

## 2023-06-21 RX ORDER — SODIUM CHLORIDE 0.9 % (FLUSH) 0.9 %
10 SYRINGE (ML) INJECTION EVERY 12 HOURS PRN
Status: DISCONTINUED | OUTPATIENT
Start: 2023-06-21 | End: 2023-06-24 | Stop reason: HOSPADM

## 2023-06-21 RX ORDER — POTASSIUM CHLORIDE 750 MG/1
10 TABLET, EXTENDED RELEASE ORAL DAILY
Status: DISCONTINUED | OUTPATIENT
Start: 2023-06-22 | End: 2023-06-22

## 2023-06-21 RX ORDER — HYDROCODONE BITARTRATE AND ACETAMINOPHEN 500; 5 MG/1; MG/1
TABLET ORAL
Status: DISCONTINUED | OUTPATIENT
Start: 2023-06-21 | End: 2023-06-24 | Stop reason: HOSPADM

## 2023-06-21 RX ORDER — DEXTROSE 40 %
30 GEL (GRAM) ORAL
Status: DISCONTINUED | OUTPATIENT
Start: 2023-06-21 | End: 2023-06-24 | Stop reason: HOSPADM

## 2023-06-21 RX ORDER — ACETAMINOPHEN 325 MG/1
650 TABLET ORAL EVERY 4 HOURS PRN
Status: DISCONTINUED | OUTPATIENT
Start: 2023-06-21 | End: 2023-06-24 | Stop reason: HOSPADM

## 2023-06-21 RX ORDER — NALOXONE HCL 0.4 MG/ML
0.02 VIAL (ML) INJECTION
Status: DISCONTINUED | OUTPATIENT
Start: 2023-06-21 | End: 2023-06-24 | Stop reason: HOSPADM

## 2023-06-21 RX ADMIN — SODIUM CHLORIDE 1000 ML: 9 INJECTION, SOLUTION INTRAVENOUS at 07:06

## 2023-06-21 RX ADMIN — FOLIC ACID: 5 INJECTION, SOLUTION INTRAMUSCULAR; INTRAVENOUS; SUBCUTANEOUS at 10:06

## 2023-06-21 NOTE — TELEPHONE ENCOUNTER
Patient called stating she is feeling weak and extremely fatigued. Dr. Ravi has spoken with patient's hematologist and hematology at [facility]. Patient is unable to get blood transfusion outpatient. Patient advised to report to ER for evaluation.

## 2023-06-22 LAB
ABO + RH BLD: NORMAL
ABO + RH BLD: NORMAL
ANION GAP SERPL CALCULATED.3IONS-SCNC: 10 MMOL/L (ref 7–16)
ANISOCYTOSIS BLD QL SMEAR: ABNORMAL
ANTIBODY IDENTIFICATION: NORMAL
BASOPHILS # BLD AUTO: 0.03 K/UL (ref 0–0.2)
BASOPHILS NFR BLD AUTO: 0.6 % (ref 0–1)
BLD PROD TYP BPU: NORMAL
BLD PROD TYP BPU: NORMAL
BLOOD UNIT EXPIRATION DATE: NORMAL
BLOOD UNIT EXPIRATION DATE: NORMAL
BLOOD UNIT TYPE CODE: 5100
BLOOD UNIT TYPE CODE: 5100
BUN SERPL-MCNC: 10 MG/DL (ref 7–18)
BUN/CREAT SERPL: 10 (ref 6–20)
CALCIUM SERPL-MCNC: 7.9 MG/DL (ref 8.5–10.1)
CHLORIDE SERPL-SCNC: 105 MMOL/L (ref 98–107)
CO2 SERPL-SCNC: 27 MMOL/L (ref 21–32)
CREAT SERPL-MCNC: 1.01 MG/DL (ref 0.55–1.02)
CROSSMATCH INTERPRETATION: NORMAL
CROSSMATCH INTERPRETATION: NORMAL
DIFFERENTIAL METHOD BLD: ABNORMAL
DIRECT COOMBS (DAT): NORMAL
DISPENSE STATUS: NORMAL
DISPENSE STATUS: NORMAL
EGFR (NO RACE VARIABLE) (RUSH/TITUS): 61 ML/MIN/1.73M2
EOSINOPHIL # BLD AUTO: 0.12 K/UL (ref 0–0.5)
EOSINOPHIL NFR BLD AUTO: 2.3 % (ref 1–4)
ERYTHROCYTE [DISTWIDTH] IN BLOOD BY AUTOMATED COUNT: 20.5 % (ref 11.5–14.5)
GLUCOSE SERPL-MCNC: 100 MG/DL (ref 74–106)
HAPTOGLOB SERPL NEPH-MCNC: 88 MG/DL (ref 30–200)
HCT VFR BLD AUTO: 16.1 % (ref 38–47)
HGB BLD-MCNC: 4.6 G/DL (ref 12–16)
HYPOCHROMIA BLD QL SMEAR: ABNORMAL
IMM GRANULOCYTES # BLD AUTO: 0.02 K/UL (ref 0–0.04)
IMM GRANULOCYTES NFR BLD: 0.4 % (ref 0–0.4)
IMM RETICS NFR: 37.6 % (ref 3–15.9)
INDIRECT COOMBS: ABNORMAL
IRON SATN MFR SERPL: 2 % (ref 14–50)
IRON SERPL-MCNC: 11 ΜG/DL (ref 50–170)
LDH SERPL-CCNC: 200 U/L (ref 87–241)
LYMPHOCYTES # BLD AUTO: 1.49 K/UL (ref 1–4.8)
LYMPHOCYTES NFR BLD AUTO: 28 % (ref 27–41)
MCH RBC QN AUTO: 21.5 PG (ref 27–31)
MCHC RBC AUTO-ENTMCNC: 28.6 G/DL (ref 32–36)
MCV RBC AUTO: 75.2 FL (ref 80–96)
MICROCYTES BLD QL SMEAR: ABNORMAL
MONOCYTES # BLD AUTO: 0.57 K/UL (ref 0–0.8)
MONOCYTES NFR BLD AUTO: 10.7 % (ref 2–6)
MPC BLD CALC-MCNC: ABNORMAL G/DL
NEUTROPHILS # BLD AUTO: 3.1 K/UL (ref 1.8–7.7)
NEUTROPHILS NFR BLD AUTO: 58 % (ref 53–65)
NRBC # BLD AUTO: 0.02 X10E3/UL
NRBC, AUTO (.00): 0.4 %
PLATELET # BLD AUTO: 72 K/UL (ref 150–400)
PLATELET MORPHOLOGY: ABNORMAL
POLYCHROMASIA BLD QL SMEAR: ABNORMAL
POTASSIUM SERPL-SCNC: 3.4 MMOL/L (ref 3.5–5.1)
RBC # BLD AUTO: 2.14 M/UL (ref 4.2–5.4)
RETICS # AUTO: 0.07 X10E6/UL (ref 0.02–0.11)
RETICS/RBC NFR AUTO: 3.1 % (ref 0.4–2.2)
RH BLD: ABNORMAL
SODIUM SERPL-SCNC: 139 MMOL/L (ref 136–145)
SPECIMEN OUTDATE: ABNORMAL
TARGETS BLD QL SMEAR: ABNORMAL
TIBC SERPL-MCNC: 487 ΜG/DL (ref 250–450)
UNIT NUMBER: NORMAL
UNIT NUMBER: NORMAL
WBC # BLD AUTO: 5.33 K/UL (ref 4.5–11)

## 2023-06-22 PROCEDURE — 36430 TRANSFUSION BLD/BLD COMPNT: CPT

## 2023-06-22 PROCEDURE — 80048 BASIC METABOLIC PNL TOTAL CA: CPT | Performed by: NURSE PRACTITIONER

## 2023-06-22 PROCEDURE — 85025 COMPLETE CBC W/AUTO DIFF WBC: CPT | Performed by: NURSE PRACTITIONER

## 2023-06-22 PROCEDURE — 25000003 PHARM REV CODE 250: Performed by: HOSPITALIST

## 2023-06-22 PROCEDURE — 99222 1ST HOSP IP/OBS MODERATE 55: CPT | Mod: ,,, | Performed by: INTERNAL MEDICINE

## 2023-06-22 PROCEDURE — 83010 ASSAY OF HAPTOGLOBIN QUANT: CPT | Performed by: INTERNAL MEDICINE

## 2023-06-22 PROCEDURE — 25000003 PHARM REV CODE 250: Performed by: NURSE PRACTITIONER

## 2023-06-22 PROCEDURE — 11000001 HC ACUTE MED/SURG PRIVATE ROOM

## 2023-06-22 PROCEDURE — 85045 AUTOMATED RETICULOCYTE COUNT: CPT | Performed by: INTERNAL MEDICINE

## 2023-06-22 PROCEDURE — 63600175 PHARM REV CODE 636 W HCPCS: Performed by: INTERNAL MEDICINE

## 2023-06-22 PROCEDURE — 93005 ELECTROCARDIOGRAM TRACING: CPT

## 2023-06-22 PROCEDURE — 25000003 PHARM REV CODE 250: Performed by: INTERNAL MEDICINE

## 2023-06-22 PROCEDURE — C9113 INJ PANTOPRAZOLE SODIUM, VIA: HCPCS | Performed by: INTERNAL MEDICINE

## 2023-06-22 PROCEDURE — 83540 ASSAY OF IRON: CPT | Performed by: NURSE PRACTITIONER

## 2023-06-22 PROCEDURE — 83615 LACTATE (LD) (LDH) ENZYME: CPT | Performed by: INTERNAL MEDICINE

## 2023-06-22 PROCEDURE — 99222 PR INITIAL HOSPITAL CARE,LEVL II: ICD-10-PCS | Mod: ,,, | Performed by: INTERNAL MEDICINE

## 2023-06-22 PROCEDURE — P9016 RBC LEUKOCYTES REDUCED: HCPCS | Performed by: HOSPITALIST

## 2023-06-22 PROCEDURE — 99233 PR SUBSEQUENT HOSPITAL CARE,LEVL III: ICD-10-PCS | Mod: ,,, | Performed by: HOSPITALIST

## 2023-06-22 PROCEDURE — 83550 IRON BINDING TEST: CPT | Performed by: NURSE PRACTITIONER

## 2023-06-22 PROCEDURE — 93010 EKG 12-LEAD: ICD-10-PCS | Mod: ,,, | Performed by: HOSPITALIST

## 2023-06-22 PROCEDURE — 99233 SBSQ HOSP IP/OBS HIGH 50: CPT | Mod: ,,, | Performed by: HOSPITALIST

## 2023-06-22 PROCEDURE — 93010 ELECTROCARDIOGRAM REPORT: CPT | Mod: ,,, | Performed by: HOSPITALIST

## 2023-06-22 RX ORDER — THIAMINE HCL 100 MG
500 TABLET ORAL 3 TIMES DAILY
Status: DISCONTINUED | OUTPATIENT
Start: 2023-06-22 | End: 2023-06-22

## 2023-06-22 RX ORDER — HYDROCODONE BITARTRATE AND ACETAMINOPHEN 500; 5 MG/1; MG/1
TABLET ORAL
Status: DISCONTINUED | OUTPATIENT
Start: 2023-06-22 | End: 2023-06-24 | Stop reason: HOSPADM

## 2023-06-22 RX ORDER — FAMOTIDINE 20 MG/1
40 TABLET, FILM COATED ORAL ONCE
Status: COMPLETED | OUTPATIENT
Start: 2023-06-22 | End: 2023-06-22

## 2023-06-22 RX ORDER — METHYLPREDNISOLONE SOD SUCC 125 MG
80 VIAL (EA) INJECTION EVERY 6 HOURS
Status: DISCONTINUED | OUTPATIENT
Start: 2023-06-22 | End: 2023-06-24 | Stop reason: HOSPADM

## 2023-06-22 RX ORDER — DIPHENHYDRAMINE HCL 25 MG
50 CAPSULE ORAL ONCE
Status: COMPLETED | OUTPATIENT
Start: 2023-06-22 | End: 2023-06-22

## 2023-06-22 RX ORDER — PANTOPRAZOLE SODIUM 40 MG/10ML
40 INJECTION, POWDER, LYOPHILIZED, FOR SOLUTION INTRAVENOUS 2 TIMES DAILY
Status: DISCONTINUED | OUTPATIENT
Start: 2023-06-22 | End: 2023-06-24 | Stop reason: HOSPADM

## 2023-06-22 RX ORDER — ACETAMINOPHEN 325 MG/1
650 TABLET ORAL ONCE
Status: COMPLETED | OUTPATIENT
Start: 2023-06-22 | End: 2023-06-22

## 2023-06-22 RX ADMIN — SODIUM CHLORIDE: 9 INJECTION, SOLUTION INTRAVENOUS at 07:06

## 2023-06-22 RX ADMIN — FAMOTIDINE 40 MG: 20 TABLET ORAL at 03:06

## 2023-06-22 RX ADMIN — SODIUM CHLORIDE 125 MG: 9 INJECTION, SOLUTION INTRAVENOUS at 01:06

## 2023-06-22 RX ADMIN — THIAMINE HYDROCHLORIDE 500 MG: 100 INJECTION, SOLUTION INTRAMUSCULAR; INTRAVENOUS at 03:06

## 2023-06-22 RX ADMIN — THIAMINE HYDROCHLORIDE 500 MG: 100 INJECTION, SOLUTION INTRAMUSCULAR; INTRAVENOUS at 10:06

## 2023-06-22 RX ADMIN — ACETAMINOPHEN 650 MG: 325 TABLET ORAL at 03:06

## 2023-06-22 RX ADMIN — PANTOPRAZOLE SODIUM 40 MG: 40 INJECTION, POWDER, FOR SOLUTION INTRAVENOUS at 10:06

## 2023-06-22 RX ADMIN — DIPHENHYDRAMINE HYDROCHLORIDE 50 MG: 25 CAPSULE ORAL at 03:06

## 2023-06-22 RX ADMIN — THIAMINE HYDROCHLORIDE 500 MG: 100 INJECTION, SOLUTION INTRAMUSCULAR; INTRAVENOUS at 09:06

## 2023-06-22 RX ADMIN — METHYLPREDNISOLONE SODIUM SUCCINATE 80 MG: 125 INJECTION, POWDER, FOR SOLUTION INTRAMUSCULAR; INTRAVENOUS at 06:06

## 2023-06-22 RX ADMIN — ONDANSETRON 8 MG: 4 TABLET, ORALLY DISINTEGRATING ORAL at 04:06

## 2023-06-22 RX ADMIN — LEVOTHYROXINE SODIUM 125 MCG: 0.12 TABLET ORAL at 10:06

## 2023-06-22 RX ADMIN — SODIUM CHLORIDE: 9 INJECTION, SOLUTION INTRAVENOUS at 03:06

## 2023-06-22 RX ADMIN — PANTOPRAZOLE SODIUM 40 MG: 40 INJECTION, POWDER, FOR SOLUTION INTRAVENOUS at 09:06

## 2023-06-22 RX ADMIN — SACUBITRIL AND VALSARTAN 1 TABLET: 24; 26 TABLET, FILM COATED ORAL at 09:06

## 2023-06-22 RX ADMIN — METHYLPREDNISOLONE SODIUM SUCCINATE 80 MG: 125 INJECTION, POWDER, FOR SOLUTION INTRAMUSCULAR; INTRAVENOUS at 11:06

## 2023-06-22 NOTE — PLAN OF CARE
[facility] - Short Stay Unit  Initial Discharge Assessment       Primary Care Provider: Dagoberto Ravi DO    Admission Diagnosis: Dizziness [R42]  Weakness [R53.1]  Chest pain [R07.9]    Admission Date: 6/21/2023  Expected Discharge Date:     Transition of Care Barriers: None    Payor: [employer] [place] / Plan: [place]ES / Product Type: Commercial /     No emergency contact information on file.    Discharge Plan A: Home  Discharge Plan B: Home      [facility] [address] [address]  Phone: [phone] Fax: [phone]    [facility] [address] [place]  [address] [place] [id number]  Phone: [phone] Fax: [phone]      Initial Assessment (most recent)       Adult Discharge Assessment - 06/22/23 1346          Discharge Assessment    Assessment Type Discharge Planning Assessment     Source of Information patient     People in Home alone     Do you expect to return to your current living situation? Yes     Do you have help at home or someone to help you manage your care at home? No     Prior to hospitilization cognitive status: Unable to Assess     Current cognitive status: Alert/Oriented     Equipment Currently Used at Home none     Readmission within 30 days? No     Patient currently being followed by outpatient case management? No     Do you currently have service(s) that help you manage your care at home? No     Do you take prescription medications? Yes     Do you have prescription coverage? Yes     Coverage [place] out of state     Do you have any problems affording any of your prescribed medications? No     Is the patient taking medications as prescribed? yes     How do you get to doctors appointments? car, drives self     Are you on dialysis? No     Do you take coumadin? No     Discharge Plan A Home     Discharge Plan B Home     DME Needed Upon Discharge  none     Discharge Plan  discussed with: Patient     Transition of Care Barriers None                   Pt lives at home alone, not current with hh and uses no dme. Pt plans to dc back home when medically ready for dc. I.M. obtained and SDOH questions completed less than 6 months ago. SW will cont to follow for dc needs.

## 2023-06-22 NOTE — NURSING
Called blood bank to check for blood.  Stated that it'd be a while for it to be ready.  Care ongoing.

## 2023-06-22 NOTE — PROGRESS NOTES
Hematology consult    Consulted for anemia and thrombocytopenia.  Patient has a history of long-standing ITP. She was managed at [facility] by Dr Walker in 2018 and thereafter lost to follow up. I met her a couple of months ago. At that time, she was noted to have normal platelet count, though typically her platelets are suppressed some degree. Also, she had iron deficiency anemia. She has been treated with oral iron therapy. She missed  followup visit.  She does report her  had Parkinsons disease and  on 5/10/23 and she has recently retired. A lot of stress recently.     Shes been hospitalized with profound anemia with thrombocytopenia. Hemoglobin 4.6. Noted that she was difficult to crossmatch due to antibodies. I did request Toi testing, which is positive with antibody present, though without homolysis. Platelets are 76k.    I coordinated c are with Dr. Elam this morning. Will plan to transfuse red blood cells, beginning with two units of least incompatible blood with two further units on order.  I have initiated steroid therapy for underlying ITP.  Additionally, she has profound iron deficiency, and I have ordered IV iron supplementation for the next several days.    Patient describes profound fatigue, and malaise. She has not noticed any obvious blood losses.  I did request FOBT testing. Appreciate GI  evaluation.    Will need to continue to monitor CBCs closely for improvement.     Tabitha Hubbard MD, PharmD

## 2023-06-22 NOTE — HPI
68 Y/O Female who presented to [facility] ED with CC weakness, dizziness,SOB onset 5 days ago. She reports the weakness and SOB is made worse by activity; better by rest.Pt endorses 1 episode of vomitus 3 days ago, she does not report any blood in it. She endorses frequent black stools. Bedside FOBT is  negative.She has been hospitalized several times in the past for symptomatic anemia/ blood transfusion. She was last hospitalized February 2023 for similar symptoms. She reports EGD, Colonoscopy were done but did not find an active bleeding source.  She reports she can not take IRON supplements PO secondary to GI upset.  She is unsure if she has had Iron transfusion in the past. She was evaluated by her PCP (Dr Ravi) 6/15/23 HH was 6.7/23.8. Pt denies hematemesis, hematochezia, Abd Pain, CP, Fever Chills.     Pt medical hx includes: ARIANA, ITP, HTN, HLD, GERD, Cardiomyopathy.     ED Course   Initial Vitals BP 96/36, HR 92, RR 16, T 98.1 F, SpO2 93%  Labs WBC 8.2, HGB 4.9, HCT 17.5, PLT 97, Na 136, K 4.2, BUN 11, CR 1.23, , PT14.3, INR 1.15  Imaging: CT Head negative, CXR negative  Interventions 1 L NS Bolus    Patient will be admitted to hospital medicine service under the direct supervision of Dr Radha CARDENAS for further evaluation and management.

## 2023-06-22 NOTE — SUBJECTIVE & OBJECTIVE
Past Medical History:   Diagnosis Date    Anxiety and depression     Cataract     Encounter for blood transfusion 2023    [facility]    ETOH abuse 10/24/2022    GERD (gastroesophageal reflux disease)     History of alcohol abuse     Hyperlipidemia     Hypertension     Hypothyroidism     Idiopathic thrombocytopenic purpura     Insomnia 2022    Iron deficiency anemia 2023    Left bundle branch block     Osteoporosis     Pernicious anemia        Past Surgical History:   Procedure Laterality Date    CATARACT EXTRACTION Bilateral      SECTION      COLONOSCOPY  2023    diagnostic to due blood loss-Dr. Tran-repeat in 1 year    ESOPHAGOGASTRODUODENOSCOPY  2023    HYSTERECTOMY      OOPHORECTOMY      THYROIDECTOMY         Review of patient's allergies indicates:   Allergen Reactions    Lisinopril Other (See Comments)     thrombocytopenia       No current facility-administered medications on file prior to encounter.     Current Outpatient Medications on File Prior to Encounter   Medication Sig    BYSTOLIC 5 mg Tab Take 5 mg by mouth once daily.    calcium carbonate-vitamin D3 500 mg-3.125 mcg (125 unit) per tablet Take 1 tablet by mouth once daily.    cholecalciferol, vitamin D3, 125 mcg (5,000 unit) Tab Take 5,000 Units by mouth once daily.    ENTRESTO 24-26 mg per tablet Take 1 tablet by mouth 2 (two) times daily.    furosemide (LASIX) 20 MG tablet Take 2 tablets (40 mg total) by mouth once daily.    mupirocin (BACTROBAN) 2 % ointment Apply topically 3 (three) times daily.    pantoprazole (PROTONIX) 40 MG tablet Take 1 tablet (40 mg total) by mouth 2 (two) times daily.    potassium chloride (KLOR-CON) 10 MEQ TbSR Take 1 tablet (10 mEq total) by mouth once daily.    SYNTHROID 125 mcg tablet Take 1 tablet (125 mcg total) by mouth once daily.    traZODone (DESYREL) 100 MG tablet Take 1 tablet (100 mg total) by mouth every evening.    vortioxetine (TRINTELLIX) 10 mg Tab Take  "1 tablet (10 mg total) by mouth once daily.    ferrous sulfate (FEOSOL) 325 mg (65 mg iron) Tab tablet Take 325 mg by mouth 2 (two) times daily.     Family History       Problem Relation (Age of Onset)    Lung cancer Father    Lupus Daughter    No Known Problems Sister, Brother, Son, Maternal Grandmother, Maternal Grandfather, Paternal Grandmother, Paternal Grandfather    Osteoporosis Mother          Tobacco Use    Smoking status: Every Day     Packs/day: 0.50     Years: 33.00     Pack years: 16.50     Types: Vaping with nicotine, Cigarettes     Start date:      Last attempt to quit:      Years since quittin.4     Passive exposure: Past    Smokeless tobacco: Never    Tobacco comments:     Former cigarette smoker, smoked "off and on" from [id number]. Quit in  then picked up vaping in .    Substance and Sexual Activity    Alcohol use: Yes     Comment: 2 cans of mixed drinks almost every night    Drug use: Never    Sexual activity: Not Currently     Partners: Male     Birth control/protection: See Surgical Hx     Review of Systems   Constitutional:  Positive for activity change and fatigue. Negative for chills, fever and unexpected weight change.   HENT:  Positive for rhinorrhea. Negative for sore throat.    Respiratory:  Positive for shortness of breath. Negative for cough and wheezing.    Cardiovascular:  Negative for chest pain, palpitations and leg swelling.   Gastrointestinal:  Positive for vomiting. Negative for abdominal pain, constipation and diarrhea.   Musculoskeletal:  Negative for back pain.   Neurological:  Positive for weakness, light-headedness and headaches.   Objective:     Vital Signs (Most Recent):  Temp: 98.1 °F (36.7 °C) (23 1734)  Pulse: 92 (23 1734)  Resp: 20 (23 1734)  BP: (!) 96/36 (23 1734)  SpO2: (!) 93 % (23 173) Vital Signs (24h Range):  Temp:  [98.1 °F (36.7 °C)] 98.1 °F (36.7 °C)  Pulse:  [92] 92  Resp:  [20] 20  SpO2:  [93 %] 93 %  BP: " (96)/(36) 96/36     Weight: 85.3 kg (188 lb)  Body mass index is 32.27 kg/m².     Physical Exam           Significant Labs: All pertinent labs within the past 24 hours have been reviewed.    Significant Imaging: I have reviewed all pertinent imaging results/findings within the past 24 hours.

## 2023-06-22 NOTE — ASSESSMENT & PLAN NOTE
Patient reports she has a Cardiomyopathy, no cardiology note found, ECHO 2/12/23 LVEF 55% Indeterminate Left ventricular diastolic function  Patient is currently taking Entresto 24/26 and BB

## 2023-06-22 NOTE — ASSESSMENT & PLAN NOTE
CIWA 0  Will give banana bag and monitor closely.  Patient reported has a few drinks daily.    INR is normal, LFTs very mild elevation.  Ativan with parameters for withdrawal symptoms

## 2023-06-22 NOTE — ASSESSMENT & PLAN NOTE
CIWA 0  Will give banana bag and monitor closely.  Patient reported has a few drinks daily.    INR is normal, LFTs very mild elevation  Thiamine 500 mg po TID.  Ativan with parameters for withdrawal symptoms    06/23 GI does w/u for cirrhosis

## 2023-06-22 NOTE — NURSING
Still waiting for blood for pt.  Followed up with blood bank, [facility], and stated that it'd be a while.  Pt has a rare antibody that requires more work to prepare.  Care ongoing.

## 2023-06-22 NOTE — ASSESSMENT & PLAN NOTE
Anemia is microcytic most likely secondary to Iron deficiency DDX Celiac disease  Most likely the source of her worsening SOB; ECHO 2/12/23 LVEF 55%  EGD 2/12/23 Imp: No varices, esophagitis, mass, peptic ulcer, AVM, blood/bleeding  Colonoscopy 2/12/23 Imp: No blood/bleeding, mass, AVM, or ulcerated mucosa  Grade I Internal Hemorrhoids     Transfuse 2 Units PRBC goal HGB 8  Iron studies pending  Oxygen Maintain O2 Sat >90%  MIKHAIL for DVT prophylaxis. No anticoagulation due to possible bleed.    Consider GI Consult 2/2 Occasional Black Stool   Consider Capsule endoscopy as outpatient

## 2023-06-22 NOTE — CONSULTS
Gastroenterology Consult Note    Chief Complaint: Symptomatic anemia    Consulted by:   Dr. Elam     HPI:  Ene Martin is a 67 y.o. obese WF with GERD, ITP, chronic ARIANA that presents with symptomatic anemia. She had a similar presentation in February and underwent bidirectional endoscopy which was negative for a GI bleeding source. In the last couple of weeks, she has had severe fatigue limiting her to sitting in chair/bed and is extremely fatigued after even a shower which is limiting in her daily life. Of note, she recently lost her  and is recently retired. She does drink 2-3 cocktails almost every evening. Denies N/V, dysphagia, abdominal pain, NSAIDs, hematochezia, diarrhea/constipation. She is unable to tolerate oral iron therapy. She does think her stools have been darker in the last couple of weeks. No known h/o advanced liver disease. No FMH GI related cancer.     Review of Systems   Constitutional:  Positive for malaise/fatigue. Negative for weight loss.   Gastrointestinal:  Negative for abdominal pain, blood in stool, constipation, diarrhea, heartburn, melena, nausea and vomiting.   All other systems reviewed and are negative.    Past Medical History:   Diagnosis Date    Anxiety and depression     Cataract     Encounter for blood transfusion 2023    [facility]    ETOH abuse 10/24/2022    GERD (gastroesophageal reflux disease)     History of alcohol abuse     Hyperlipidemia     Hypertension     Hypothyroidism     Idiopathic thrombocytopenic purpura     Insomnia 2022    Iron deficiency anemia 2023    Left bundle branch block     Osteoporosis     Pernicious anemia      Past Surgical History:   Procedure Laterality Date    CATARACT EXTRACTION Bilateral      SECTION      COLONOSCOPY  2023    diagnostic to due blood loss-Dr. Tran-repeat in 1 year    ESOPHAGOGASTRODUODENOSCOPY  2023    HYSTERECTOMY  2003    OOPHORECTOMY      THYROIDECTOMY    "    Family History   Problem Relation Age of Onset    Lung cancer Father     Osteoporosis Mother     No Known Problems Sister     No Known Problems Brother     Lupus Daughter     No Known Problems Son     No Known Problems Maternal Grandmother     No Known Problems Maternal Grandfather     No Known Problems Paternal Grandmother     No Known Problems Paternal Grandfather        OBJECTIVE:  BP (!) 114/52   Pulse 70   Temp 98.3 °F (36.8 °C)   Resp 16   Ht 5' 4" (1.626 m)   Wt 85.3 kg (188 lb)   LMP 01/01/2003 (Within Months)   SpO2 (!) 94%   Breastfeeding No   BMI 32.27 kg/m²   GEN: chronically ill appearing, cooperative, NAD  HEENT: NCAT, OP benign, MMM  NECK: Supple, no LAD  CV: normal rate, regular rhythm  RESP: CTA bilaterally, unlabored  ABD: NABS, ND, NT, soft, no guarding  EXT: No clubbing, cyanosis, or edema.  SKIN: Warm and dry; pallor   NEURO: AAO x4. Afocal.    LABS:  Recent Results (from the past 336 hour(s))   CBC with Differential    Collection Time: 06/22/23  3:20 AM   Result Value Ref Range    WBC 5.33 4.50 - 11.00 K/uL    Hemoglobin 4.6 (LL) 12.0 - 16.0 g/dL    Hematocrit 16.1 (LL) 38.0 - 47.0 %    Platelet Count 72 (L) 150 - 400 K/uL   CBC with Differential    Collection Time: 06/21/23  6:44 PM   Result Value Ref Range    WBC 8.25 4.50 - 11.00 K/uL    Hemoglobin 4.9 (LL) 12.0 - 16.0 g/dL    Hematocrit 17.5 (LL) 38.0 - 47.0 %    Platelet Count 97 (L) 150 - 400 K/uL   CBC with Differential    Collection Time: 06/15/23  2:05 PM   Result Value Ref Range    WBC 7.88 4.50 - 11.00 K/uL    Hemoglobin 6.7 (L) 12.0 - 16.0 g/dL    Hematocrit 23.8 (L) 38.0 - 47.0 %    Platelet Count 93 (L) 150 - 400 K/uL     CMP  Sodium   Date Value Ref Range Status   06/22/2023 139 136 - 145 mmol/L Final     Potassium   Date Value Ref Range Status   06/22/2023 3.4 (L) 3.5 - 5.1 mmol/L Final     Chloride   Date Value Ref Range Status   06/22/2023 105 98 - 107 mmol/L Final     CO2   Date Value Ref Range Status "   06/22/2023 27 21 - 32 mmol/L Final     Glucose   Date Value Ref Range Status   06/22/2023 100 74 - 106 mg/dL Final     BUN   Date Value Ref Range Status   06/22/2023 10 7 - 18 mg/dL Final     Creatinine   Date Value Ref Range Status   06/22/2023 1.01 0.55 - 1.02 mg/dL Final     Calcium   Date Value Ref Range Status   06/22/2023 7.9 (L) 8.5 - 10.1 mg/dL Final     Total Protein   Date Value Ref Range Status   06/21/2023 6.6 6.4 - 8.2 g/dL Final     Albumin   Date Value Ref Range Status   06/21/2023 3.0 (L) 3.5 - 5.0 g/dL Final     Bilirubin, Total   Date Value Ref Range Status   06/21/2023 0.4 >0.0 - 1.2 mg/dL Final     Alk Phos   Date Value Ref Range Status   06/21/2023 78 55 - 142 U/L Final     AST   Date Value Ref Range Status   06/21/2023 79 (H) 15 - 37 U/L Final     ALT   Date Value Ref Range Status   06/21/2023 65 (H) 13 - 56 U/L Final     Anion Gap   Date Value Ref Range Status   06/22/2023 10 7 - 16 mmol/L Final     eGFR   Date Value Ref Range Status   06/22/2023 61 >=60 mL/min/1.73m2 Final     Lab Results   Component Value Date    INR 1.15 06/21/2023    INR 1.17 04/29/2023    INR 1.12 02/11/2023       IMAGING:  Imaging Results              X-Ray Chest AP Portable (Final result)  Result time 06/21/23 19:31:24      Final result by Deyvi Calderon II, MD (06/21/23 19:31:24)                   Impression:      No evidence of acute cardiopulmonary disease.      Electronically signed by: Deyvi Calderon  Date:    06/21/2023  Time:    19:31               Narrative:    EXAMINATION:  XR CHEST AP PORTABLE    CLINICAL HISTORY:  Weakness    COMPARISON:  11 February 2023    TECHNIQUE:  XR CHEST AP PORTABLE    FINDINGS:  The heart and mediastinum are normal in size and configuration.  The pulmonary vascularity is normal in caliber.  No lung infiltrates, effusions, pneumothorax or other abnormality is demonstrated.                                       CT Head Without Contrast (Final result)  Result time 06/21/23  19:31:01      Final result by Deyvi Calderon II, MD (06/21/23 19:31:01)                   Impression:      No evidence of abnormality demonstrated.      Electronically signed by: Deyvi Calderon  Date:    06/21/2023  Time:    19:31               Narrative:    EXAMINATION:  CT HEAD WITHOUT CONTRAST    CLINICAL HISTORY:  Dizziness, persistent/recurrent, cardiac or vascular cause suspected;    TECHNIQUE:  Axial CT imaging of the brain is performed without contrast with 3 mm increments.    CT dose reduction technique used - Dose Rite and tube current modulation.    COMPARISON:  24 October 2022    FINDINGS:  No evidence of hemorrhage, mass, mass effect, midline shift or acute infarct seen.  The brain parenchyma attenuation and differentiation appears within normal limits. The ventricles and cisterns are normal in caliber.  No cranial or skull base abnormality is identified.                                    EGD 2/2023  - 2-cm hiatal hernia  - Minimal gastritis  - The exam was otherwise normal  - No varices, esophagitis, mass, peptic ulcer, AVM, blood/bleeding    Colonoscopy 2/2023  - Erythema in the cecum favored to be related to barotrauma from endoscopy  - No blood/bleeding, mass, AVM, or ulcerated mucosa  - Grade I Internal Hemorrhoids  - The prep was inadequate for screening     Recommendation    No obvious source on EGD/Colonoscopy for anemia  FOBT result likely related to hemorrhoids  Agree with anemia workup and replacement as needed  Recommend non-urgent colonoscopy in 1 year for screening purposes  If she has further drops in H&H, can consider clinically directed repeat endoscopy or Tagged RBC/CT GIB protocol to help localize bleeding source        ASSESSMENT:    67 y.o. obese WF with GERD, ITP, chronic ARIANA that presents with symptomatic anemia.     PLAN:    Acute on Chronic Iron Deficiency Anemia  - Hgb 9.2 => 6.7 => 4.6 over the last month; reports dark stools and not on iron therapy  - bidirectional  "endoscopy previously unremarkable  - given recurrent anemia and "dark" stools, I would offer to proceed with EGD with Push Enteroscopy - discussed this with patient who will consider it; will require transfusion to reach Hgb 7 prior to anesthesia for procedure   - if repeat endoscopy is negative, would then consider outpatient VCE  - chronic alcohol use with thrombocytopenia and no abdominal imaging that I can see; will obtain Abd US with elastography  - chronic EtOH can also cause bone marrow suppression   - consider IV iron replacement with Ferrlecit 250 mg daily for up to 4 days since she reports inability tot tolerate oral replacement   - will make NPO p MN tonight in the event she is able to be transfused and agrees to endoscopy - would plan for EGD with PE tomorrow if all of this aligns       Thank you for the consult and including me in the care of this patient. Please call me with questions.     Talat Tran MD  Gastroenterology  "

## 2023-06-22 NOTE — ASSESSMENT & PLAN NOTE
Anemia is microcytic most likely secondary to Iron deficiency vs GI Blood loss  Most likely the source of her worsening SOB; ECHO 2/12/23 LVEF 55%  EGD 2/12/23 Imp: No varices, esophagitis, mass, peptic ulcer, AVM, blood/bleeding  Colonoscopy 2/12/23 Imp: No blood/bleeding, mass, AVM, or ulcerated mucosa  Grade I Internal Hemorrhoids     Transfuse 2 Units PRBC goal HGB 8  Iron studies pending  Oxygen Maintain O2 Sat >90%  MIKHAIL for DVT prophylaxis. No anticoagulation due to possible bleed.    Consider GI Consult 2/2 Occasional Black Stool

## 2023-06-22 NOTE — ASSESSMENT & PLAN NOTE
Patient reports she has a Cardiomyopathy, no cardiology note found, ECHO 2/12/23 LVEF 55% Indeterminate Left ventricular diastolic function  Patient is currently taking Entresto 24/26 and BB  Will need follow up with Cardiology as outpatient

## 2023-06-22 NOTE — H&P
[facility] Stay [facility]  [facility] Medicine  History & Physical    Patient Name: Ene Martin  MRN: [id number]  Patient Class: IP- Inpatient  Admission Date: 6/21/2023  Attending Physician: Darrian Garcia MD   Primary Care Provider: Dagoberto Ravi DO         Patient information was obtained from patient, past medical records and ER records.     Subjective:     Principal Problem:Symptomatic anemia    Chief Complaint:   Chief Complaint   Patient presents with    Weakness     Pt c/o generalized weakness, nausea, and light headedness.        HPI: 68 Y/O Female who presented to [facility] ED with CC weakness, dizziness,SOB onset 5 days ago. She reports the weakness and SOB is made worse by activity; better by rest.Pt endorses 1 episode of vomitus 3 days ago, she does not report any blood in it. She endorses frequent black stools. Bedside FOBT is  negative.She has been hospitalized several times in the past for symptomatic anemia/ blood transfusion. She was last hospitalized February 2023 for similar symptoms. She reports EGD, Colonoscopy were done but did not find an active bleeding source.  She reports she can not take IRON supplements PO secondary to GI upset.  She is unsure if she has had Iron transfusion in the past. She was evaluated by her PCP (Dr Ravi) 6/15/23 HH was 6.7/23.8. Pt denies hematemesis, hematochezia, Abd Pain, CP, Fever Chills.     Pt medical hx includes: ARIANA, ITP, HTN, HLD, GERD, Cardiomyopathy.     ED Course   Initial Vitals BP 96/36, HR 92, RR 16, T 98.1 F, SpO2 93%  Labs WBC 8.2, HGB 4.9, HCT 17.5, PLT 97, Na 136, K 4.2, BUN 11, CR 1.23, , PT14.3, INR 1.15  Imaging: CT Head negative, CXR negative  Interventions 1 L NS Bolus    Patient will be admitted to hospital medicine service under the direct supervision of Dr Radha CARDENAS for further evaluation and management.       Past Medical History:   Diagnosis Date    Anxiety and depression     Cataract     Encounter for blood transfusion  2023    [facility]    ETOH abuse 10/24/2022    GERD (gastroesophageal reflux disease)     History of alcohol abuse     Hyperlipidemia     Hypertension     Hypothyroidism     Idiopathic thrombocytopenic purpura     Insomnia 2022    Iron deficiency anemia 2023    Left bundle branch block     Osteoporosis     Pernicious anemia        Past Surgical History:   Procedure Laterality Date    CATARACT EXTRACTION Bilateral      SECTION      COLONOSCOPY  2023    diagnostic to due blood loss-Dr. Tran-repeat in 1 year    ESOPHAGOGASTRODUODENOSCOPY  2023    HYSTERECTOMY  2003    OOPHORECTOMY      THYROIDECTOMY         Review of patient's allergies indicates:   Allergen Reactions    Lisinopril Other (See Comments)     thrombocytopenia       No current facility-administered medications on file prior to encounter.     Current Outpatient Medications on File Prior to Encounter   Medication Sig    BYSTOLIC 5 mg Tab Take 5 mg by mouth once daily.    calcium carbonate-vitamin D3 500 mg-3.125 mcg (125 unit) per tablet Take 1 tablet by mouth once daily.    cholecalciferol, vitamin D3, 125 mcg (5,000 unit) Tab Take 5,000 Units by mouth once daily.    ENTRESTO 24-26 mg per tablet Take 1 tablet by mouth 2 (two) times daily.    furosemide (LASIX) 20 MG tablet Take 2 tablets (40 mg total) by mouth once daily.    mupirocin (BACTROBAN) 2 % ointment Apply topically 3 (three) times daily.    pantoprazole (PROTONIX) 40 MG tablet Take 1 tablet (40 mg total) by mouth 2 (two) times daily.    potassium chloride (KLOR-CON) 10 MEQ TbSR Take 1 tablet (10 mEq total) by mouth once daily.    SYNTHROID 125 mcg tablet Take 1 tablet (125 mcg total) by mouth once daily.    traZODone (DESYREL) 100 MG tablet Take 1 tablet (100 mg total) by mouth every evening.    vortioxetine (TRINTELLIX) 10 mg Tab Take 1 tablet (10 mg total) by mouth once daily.    ferrous sulfate (FEOSOL) 325 mg (65 mg iron) Tab tablet Take 325 mg  "by mouth 2 (two) times daily.     Family History       Problem Relation (Age of Onset)    Lung cancer Father    Lupus Daughter    No Known Problems Sister, Brother, Son, Maternal Grandmother, Maternal Grandfather, Paternal Grandmother, Paternal Grandfather    Osteoporosis Mother          Tobacco Use    Smoking status: Every Day     Packs/day: 0.50     Years: 33.00     Pack years: 16.50     Types: Vaping with nicotine, Cigarettes     Start date:      Last attempt to quit:      Years since quittin.4     Passive exposure: Past    Smokeless tobacco: Never    Tobacco comments:     Former cigarette smoker, smoked "off and on" from [id number]. Quit in  then picked up vaping in .    Substance and Sexual Activity    Alcohol use: Yes     Comment: 2 cans of mixed drinks almost every night    Drug use: Never    Sexual activity: Not Currently     Partners: Male     Birth control/protection: See Surgical Hx     Review of Systems   Constitutional:  Positive for activity change and fatigue. Negative for chills, fever and unexpected weight change.   HENT:  Positive for rhinorrhea. Negative for sore throat.    Respiratory:  Positive for shortness of breath. Negative for cough and wheezing.    Cardiovascular:  Negative for chest pain, palpitations and leg swelling.   Gastrointestinal:  Positive for vomiting. Negative for abdominal pain, constipation and diarrhea.   Musculoskeletal:  Negative for back pain.   Neurological:  Positive for weakness, light-headedness and headaches.   Objective:     Vital Signs (Most Recent):  Temp: 98.1 °F (36.7 °C) (23 1734)  Pulse: 92 (23 1734)  Resp: 20 (23 1734)  BP: (!) 96/36 (23 1734)  SpO2: (!) 93 % (23 1734) Vital Signs (24h Range):  Temp:  [98.1 °F (36.7 °C)] 98.1 °F (36.7 °C)  Pulse:  [92] 92  Resp:  [20] 20  SpO2:  [93 %] 93 %  BP: (96)/(36) 96/36     Weight: 85.3 kg (188 lb)  Body mass index is 32.27 kg/m².     Physical Exam         "   Significant Labs: All pertinent labs within the past 24 hours have been reviewed.    Significant Imaging: I have reviewed all pertinent imaging results/findings within the past 24 hours.    Assessment/Plan:     * Symptomatic anemia  Anemia is microcytic most likely secondary to Iron deficiency DDX Celiac disease  Most likely the source of her worsening SOB; ECHO 2/12/23 LVEF 55%  EGD 2/12/23 Imp: No varices, esophagitis, mass, peptic ulcer, AVM, blood/bleeding  Colonoscopy 2/12/23 Imp: No blood/bleeding, mass, AVM, or ulcerated mucosa  Grade I Internal Hemorrhoids     Transfuse 2 Units PRBC goal HGB 8  Iron studies pending  Oxygen Maintain O2 Sat >90%  MIKHAIL for DVT prophylaxis. No anticoagulation due to possible bleed.    Consider GI Consult 2/2 Occasional Black Stool   Consider Capsule endoscopy as outpatient         Hypertension  Hold BB secondary to Soft BP on admission  Continue to monitor        Alcohol use  CIWA 0  Will give banana bag and monitor closely.  Patient reported has a few drinks daily.    INR is normal, LFTs very mild elevation  Thiamine 500 mg po TID.  Ativan with parameters for withdrawal symptoms      Hypothyroidism  Continue Home Synthroid 125 mcg daily      Gastroesophageal reflux disease  Continue Home Protonix 40 mg BID      History of ITP  Stable and will monitor.  Platelets at 97 K.    Coags are normal.       Insomnia  Continue Home trazadone 50 mg PO QHS      Cardiomyopathy  Patient reports she has a Cardiomyopathy, no cardiology note found, ECHO 2/12/23 LVEF 55% Indeterminate Left ventricular diastolic function  Patient is currently taking Entresto 24/26 and BB  Will need follow up with Cardiology as outpatient        VTE Risk Mitigation (From admission, onward)           Ordered     Place MIKHAIL hose  Until discontinued         06/21/23 2044     IP VTE HIGH RISK PATIENT  Once         06/21/23 2044                               Esther Carson MD  Department of Hospital Medicine  [facility]

## 2023-06-23 ENCOUNTER — ANESTHESIA (OUTPATIENT)
Dept: GASTROENTEROLOGY | Facility: HOSPITAL | Age: 68
DRG: 812 | End: 2023-06-23
Payer: COMMERCIAL

## 2023-06-23 ENCOUNTER — ANESTHESIA EVENT (OUTPATIENT)
Dept: GASTROENTEROLOGY | Facility: HOSPITAL | Age: 68
DRG: 812 | End: 2023-06-23
Payer: COMMERCIAL

## 2023-06-23 VITALS
WEIGHT: 188 LBS | OXYGEN SATURATION: 97 % | DIASTOLIC BLOOD PRESSURE: 68 MMHG | RESPIRATION RATE: 20 BRPM | HEIGHT: 64 IN | SYSTOLIC BLOOD PRESSURE: 135 MMHG | BODY MASS INDEX: 32.1 KG/M2 | HEART RATE: 88 BPM | TEMPERATURE: 98 F

## 2023-06-23 PROBLEM — D64.9 SYMPTOMATIC ANEMIA: Status: RESOLVED | Noted: 2023-02-12 | Resolved: 2023-06-23

## 2023-06-23 LAB
ALBUMIN SERPL BCP-MCNC: 3.1 G/DL (ref 3.5–5)
ALBUMIN/GLOB SERPL: 0.8 {RATIO}
ALP SERPL-CCNC: 79 U/L (ref 55–142)
ALT SERPL W P-5'-P-CCNC: 65 U/L (ref 13–56)
ANION GAP SERPL CALCULATED.3IONS-SCNC: 14 MMOL/L (ref 7–16)
ANISOCYTOSIS BLD QL SMEAR: ABNORMAL
APTT PPP: 33.1 SECONDS (ref 25.2–37.3)
AST SERPL W P-5'-P-CCNC: 52 U/L (ref 15–37)
BASOPHILS # BLD AUTO: 0.05 K/UL (ref 0–0.2)
BASOPHILS NFR BLD AUTO: 0.9 % (ref 0–1)
BILIRUB SERPL-MCNC: 0.6 MG/DL (ref ?–1.2)
BUN SERPL-MCNC: 9 MG/DL (ref 7–18)
BUN/CREAT SERPL: 7 (ref 6–20)
CALCIUM SERPL-MCNC: 8.3 MG/DL (ref 8.5–10.1)
CHLORIDE SERPL-SCNC: 104 MMOL/L (ref 98–107)
CO2 SERPL-SCNC: 24 MMOL/L (ref 21–32)
CREAT SERPL-MCNC: 1.21 MG/DL (ref 0.55–1.02)
DIFFERENTIAL METHOD BLD: ABNORMAL
EGFR (NO RACE VARIABLE) (RUSH/TITUS): 49 ML/MIN/1.73M2
EOSINOPHIL # BLD AUTO: 0.01 K/UL (ref 0–0.5)
EOSINOPHIL NFR BLD AUTO: 0.2 % (ref 1–4)
EOSINOPHIL NFR BLD MANUAL: 1 % (ref 1–4)
ERYTHROCYTE [DISTWIDTH] IN BLOOD BY AUTOMATED COUNT: 19.1 % (ref 11.5–14.5)
GLOBULIN SER-MCNC: 3.7 G/DL (ref 2–4)
GLUCOSE SERPL-MCNC: 215 MG/DL (ref 74–106)
HCT VFR BLD AUTO: 25.3 % (ref 38–47)
HGB BLD-MCNC: 7.5 G/DL (ref 12–16)
HYPOCHROMIA BLD QL SMEAR: ABNORMAL
IMM GRANULOCYTES # BLD AUTO: 0.22 K/UL (ref 0–0.04)
IMM GRANULOCYTES NFR BLD: 3.8 % (ref 0–0.4)
INR BLD: 1.1
LYMPHOCYTES # BLD AUTO: 0.53 K/UL (ref 1–4.8)
LYMPHOCYTES NFR BLD AUTO: 9 % (ref 27–41)
LYMPHOCYTES NFR BLD MANUAL: 5 % (ref 27–41)
MCH RBC QN AUTO: 23.8 PG (ref 27–31)
MCHC RBC AUTO-ENTMCNC: 29.6 G/DL (ref 32–36)
MCV RBC AUTO: 80.3 FL (ref 80–96)
MONOCYTES # BLD AUTO: 0.06 K/UL (ref 0–0.8)
MONOCYTES NFR BLD AUTO: 1 % (ref 2–6)
MPC BLD CALC-MCNC: ABNORMAL G/DL
NEUTROPHILS # BLD AUTO: 4.99 K/UL (ref 1.8–7.7)
NEUTROPHILS NFR BLD AUTO: 85.1 % (ref 53–65)
NEUTS SEG NFR BLD MANUAL: 94 % (ref 50–62)
NRBC # BLD AUTO: 0.1 X10E3/UL
NRBC, AUTO (.00): 1.7 %
PLATELET # BLD AUTO: 95 K/UL (ref 150–400)
PLATELET MORPHOLOGY: ABNORMAL
POLYCHROMASIA BLD QL SMEAR: ABNORMAL
POTASSIUM SERPL-SCNC: 4 MMOL/L (ref 3.5–5.1)
PROT SERPL-MCNC: 6.8 G/DL (ref 6.4–8.2)
PROTHROMBIN TIME: 13.8 SECONDS (ref 11.7–14.7)
RBC # BLD AUTO: 3.15 M/UL (ref 4.2–5.4)
SODIUM SERPL-SCNC: 138 MMOL/L (ref 136–145)
WBC # BLD AUTO: 5.86 K/UL (ref 4.5–11)

## 2023-06-23 PROCEDURE — 88305 SURGICAL PATHOLOGY: ICD-10-PCS | Mod: 26,XU,, | Performed by: PATHOLOGY

## 2023-06-23 PROCEDURE — 25000003 PHARM REV CODE 250: Performed by: INTERNAL MEDICINE

## 2023-06-23 PROCEDURE — 25000003 PHARM REV CODE 250: Performed by: NURSE PRACTITIONER

## 2023-06-23 PROCEDURE — P9016 RBC LEUKOCYTES REDUCED: HCPCS | Performed by: HOSPITALIST

## 2023-06-23 PROCEDURE — C9113 INJ PANTOPRAZOLE SODIUM, VIA: HCPCS | Performed by: INTERNAL MEDICINE

## 2023-06-23 PROCEDURE — 85730 THROMBOPLASTIN TIME PARTIAL: CPT | Performed by: INTERNAL MEDICINE

## 2023-06-23 PROCEDURE — 88342 SURGICAL PATHOLOGY: ICD-10-PCS | Mod: 26,,, | Performed by: PATHOLOGY

## 2023-06-23 PROCEDURE — 80053 COMPREHEN METABOLIC PANEL: CPT | Performed by: INTERNAL MEDICINE

## 2023-06-23 PROCEDURE — 25000003 PHARM REV CODE 250: Performed by: NURSE ANESTHETIST, CERTIFIED REGISTERED

## 2023-06-23 PROCEDURE — 88305 TISSUE EXAM BY PATHOLOGIST: CPT | Mod: 26,XU,, | Performed by: PATHOLOGY

## 2023-06-23 PROCEDURE — 88342 IMHCHEM/IMCYTCHM 1ST ANTB: CPT | Mod: 26,,, | Performed by: PATHOLOGY

## 2023-06-23 PROCEDURE — 88305 TISSUE EXAM BY PATHOLOGIST: CPT | Mod: TC,SUR | Performed by: INTERNAL MEDICINE

## 2023-06-23 PROCEDURE — 25000003 PHARM REV CODE 250: Performed by: HOSPITALIST

## 2023-06-23 PROCEDURE — D9220A PRA ANESTHESIA: Mod: ,,, | Performed by: NURSE ANESTHETIST, CERTIFIED REGISTERED

## 2023-06-23 PROCEDURE — 44361 PR SMALL BOWEL ENDOSCOPY,BIOPSY: ICD-10-PCS | Mod: ,,, | Performed by: INTERNAL MEDICINE

## 2023-06-23 PROCEDURE — D9220A PRA ANESTHESIA: ICD-10-PCS | Mod: ,,, | Performed by: NURSE ANESTHETIST, CERTIFIED REGISTERED

## 2023-06-23 PROCEDURE — 63600175 PHARM REV CODE 636 W HCPCS: Performed by: INTERNAL MEDICINE

## 2023-06-23 PROCEDURE — 63600175 PHARM REV CODE 636 W HCPCS: Performed by: NURSE ANESTHETIST, CERTIFIED REGISTERED

## 2023-06-23 PROCEDURE — 44361 SMALL BOWEL ENDOSCOPY/BIOPSY: CPT | Mod: ,,, | Performed by: INTERNAL MEDICINE

## 2023-06-23 PROCEDURE — 44361 SMALL BOWEL ENDOSCOPY/BIOPSY: CPT | Performed by: INTERNAL MEDICINE

## 2023-06-23 PROCEDURE — 85025 COMPLETE CBC W/AUTO DIFF WBC: CPT | Performed by: INTERNAL MEDICINE

## 2023-06-23 PROCEDURE — 99239 HOSP IP/OBS DSCHRG MGMT >30: CPT | Mod: ,,, | Performed by: HOSPITALIST

## 2023-06-23 PROCEDURE — 99239 PR HOSPITAL DISCHARGE DAY,>30 MIN: ICD-10-PCS | Mod: ,,, | Performed by: HOSPITALIST

## 2023-06-23 RX ORDER — DIPHENHYDRAMINE HCL 25 MG
25 CAPSULE ORAL ONCE
Status: COMPLETED | OUTPATIENT
Start: 2023-06-23 | End: 2023-06-23

## 2023-06-23 RX ORDER — FAMOTIDINE 20 MG/1
40 TABLET, FILM COATED ORAL ONCE
Status: COMPLETED | OUTPATIENT
Start: 2023-06-23 | End: 2023-06-23

## 2023-06-23 RX ORDER — ACETAMINOPHEN 325 MG/1
650 TABLET ORAL ONCE
Status: COMPLETED | OUTPATIENT
Start: 2023-06-23 | End: 2023-06-23

## 2023-06-23 RX ORDER — LIDOCAINE HYDROCHLORIDE 20 MG/ML
INJECTION, SOLUTION EPIDURAL; INFILTRATION; INTRACAUDAL; PERINEURAL
Status: DISCONTINUED | OUTPATIENT
Start: 2023-06-23 | End: 2023-06-23

## 2023-06-23 RX ORDER — PROPOFOL 10 MG/ML
VIAL (ML) INTRAVENOUS
Status: DISCONTINUED | OUTPATIENT
Start: 2023-06-23 | End: 2023-06-23

## 2023-06-23 RX ORDER — DEXAMETHASONE 4 MG/1
20 TABLET ORAL DAILY
Qty: 20 TABLET | Refills: 0 | Status: SHIPPED | OUTPATIENT
Start: 2023-06-23 | End: 2023-06-27

## 2023-06-23 RX ORDER — HYDROCODONE BITARTRATE AND ACETAMINOPHEN 500; 5 MG/1; MG/1
TABLET ORAL
Status: DISCONTINUED | OUTPATIENT
Start: 2023-06-23 | End: 2023-06-24 | Stop reason: HOSPADM

## 2023-06-23 RX ADMIN — PROPOFOL 50 MG: 10 INJECTION, EMULSION INTRAVENOUS at 02:06

## 2023-06-23 RX ADMIN — PANTOPRAZOLE SODIUM 40 MG: 40 INJECTION, POWDER, FOR SOLUTION INTRAVENOUS at 08:06

## 2023-06-23 RX ADMIN — METHYLPREDNISOLONE SODIUM SUCCINATE 80 MG: 125 INJECTION, POWDER, FOR SOLUTION INTRAMUSCULAR; INTRAVENOUS at 11:06

## 2023-06-23 RX ADMIN — FAMOTIDINE 40 MG: 20 TABLET ORAL at 05:06

## 2023-06-23 RX ADMIN — DIPHENHYDRAMINE HYDROCHLORIDE 25 MG: 25 CAPSULE ORAL at 05:06

## 2023-06-23 RX ADMIN — SACUBITRIL AND VALSARTAN 1 TABLET: 24; 26 TABLET, FILM COATED ORAL at 09:06

## 2023-06-23 RX ADMIN — ACETAMINOPHEN 650 MG: 325 TABLET ORAL at 05:06

## 2023-06-23 RX ADMIN — THIAMINE HYDROCHLORIDE 500 MG: 100 INJECTION, SOLUTION INTRAMUSCULAR; INTRAVENOUS at 08:06

## 2023-06-23 RX ADMIN — PANTOPRAZOLE SODIUM 40 MG: 40 INJECTION, POWDER, FOR SOLUTION INTRAVENOUS at 09:06

## 2023-06-23 RX ADMIN — SODIUM CHLORIDE: 9 INJECTION, SOLUTION INTRAVENOUS at 02:06

## 2023-06-23 RX ADMIN — METHYLPREDNISOLONE SODIUM SUCCINATE 80 MG: 125 INJECTION, POWDER, FOR SOLUTION INTRAMUSCULAR; INTRAVENOUS at 05:06

## 2023-06-23 RX ADMIN — ONDANSETRON 8 MG: 4 TABLET, ORALLY DISINTEGRATING ORAL at 05:06

## 2023-06-23 RX ADMIN — SODIUM CHLORIDE 125 MG: 9 INJECTION, SOLUTION INTRAVENOUS at 09:06

## 2023-06-23 RX ADMIN — THIAMINE HYDROCHLORIDE 500 MG: 100 INJECTION, SOLUTION INTRAMUSCULAR; INTRAVENOUS at 03:06

## 2023-06-23 RX ADMIN — LIDOCAINE HYDROCHLORIDE 50 MG: 20 INJECTION, SOLUTION INTRAVENOUS at 02:06

## 2023-06-23 NOTE — ANESTHESIA PREPROCEDURE EVALUATION
06/23/2023  Ene Martin is a 67 y.o., female.      Pre-op Assessment    I have reviewed the Patient Summary Reports.     I have reviewed the Nursing Notes. I have reviewed the NPO Status.   I have reviewed the Medications.     Review of Systems  Anesthesia Hx:  No problems with previous Anesthesia  Denies Family Hx of Anesthesia complications.   Denies Personal Hx of Anesthesia complications.   Social:  Non-Smoker, Alcohol Use    Hematology/Oncology:     Oncology Normal    -- Anemia:   EENT/Dental:EENT/Dental Normal   Cardiovascular:   Hypertension Dysrhythmias (LBBB) hyperlipidemia ECG has been reviewed.    Pulmonary:  Pulmonary Normal    Renal/:  Renal/ Normal     Hepatic/GI:   GERD    Musculoskeletal:  Musculoskeletal Normal    Neurological:  Neurology Normal    Endocrine:   Hypothyroidism    Dermatological:  Skin Normal    Psych:   Psychiatric History anxiety depression          Physical Exam  General: Well nourished    Airway:  Mallampati: II   Mouth Opening: Normal  TM Distance: Normal  Tongue: Normal  Neck ROM: Normal ROM    Dental:  Intact        Anesthesia Plan  Type of Anesthesia, risks & benefits discussed:    Anesthesia Type: Gen Natural Airway  Intra-op Monitoring Plan: Standard ASA Monitors  Post Op Pain Control Plan: multimodal analgesia  Induction:  IV  Informed Consent: Informed consent signed with the Patient and all parties understand the risks and agree with anesthesia plan.  All questions answered. Patient consented to blood products? Yes  ASA Score: 3  Day of Surgery Review of History & Physical: H&P Update referred to the surgeon/provider.I have interviewed and examined the patient. I have reviewed the patient's H&P dated: There are no significant changes.     Ready For Surgery From Anesthesia Perspective.     .

## 2023-06-23 NOTE — TRANSFER OF CARE
"Anesthesia Transfer of Care Note    Patient: Ene Martin    Procedure(s) Performed: EGD    Patient location: GI    Anesthesia Type: general    Transport from OR: Transported from OR on room air with adequate spontaneous ventilation. Continuous ECG monitoring in transport. Continuous SpO2 monitoring in transport    Post pain: adequate analgesia    Post assessment: no apparent anesthetic complications    Post vital signs: stable    Level of consciousness: responds to stimulation, awake and sedated    Nausea/Vomiting: no nausea/vomiting    Complications: none    Transfer of care protocol was followed      Last vitals:   Visit Vitals  BP (!) 117/49 (BP Location: Right arm, Patient Position: Lying)   Pulse 90   Temp 36.8 °C (98.2 °F) (Oral)   Resp 12   Ht 5' 4" (1.626 m)   Wt 85.3 kg (188 lb)   LMP 01/01/2003 (Within Months)   SpO2 98%   Breastfeeding No   BMI 32.27 kg/m²     "

## 2023-06-23 NOTE — ANESTHESIA POSTPROCEDURE EVALUATION
Anesthesia Post Evaluation    Patient: Ene Martin    Procedure(s) Performed: EGD    Final Anesthesia Type: general      Patient location during evaluation: GI PACU  Patient participation: Yes- Able to Participate  Level of consciousness: awake and alert and oriented  Post-procedure vital signs: reviewed and stable  Pain management: adequate  Airway patency: patent    PONV status at discharge: No PONV  Anesthetic complications: no      Cardiovascular status: blood pressure returned to baseline and stable  Respiratory status: unassisted, spontaneous ventilation and room air  Hydration status: euvolemic  Follow-up not needed.          Vitals Value Taken Time   /49 06/23/23 1434   Temp 36.8 °C (98.2 °F) 06/23/23 1434   Pulse 90 06/23/23 1434   Resp 12 06/23/23 1434   SpO2 98 % 06/23/23 1434         No case tracking events are documented in the log.      Pain/Joslyn Score: Pain Rating Prior to Med Admin: 0 (pre medication) (6/22/2023  3:34 PM)  Pain Rating Post Med Admin: 0 (6/22/2023  4:34 PM)

## 2023-06-23 NOTE — DISCHARGE INSTRUCTIONS
Procedure Date  6/23/23     Impression  Overall Impression:   - Mild gastritis, biopsied   - The examined esophagus, duodenum, and jejunum (proximal) were normal   - The exam was otherwise normal   - No stricture, hiatal hernia, esophagitis, peptic ulcer, bezoar, GOO, AVM, or mass  - Random biopsies obtained in the duodenum (chronic ARIANA)      Recommendation    Await pathology results  No findings on exam to explain anemia  Follow up in GI clinic with ANA Stern 1 week after discharge, and we will plan for outpatient capsule endoscopy   Deescalate PPI to off or daily dosing; use pepcid as needed for reflux symptoms

## 2023-06-23 NOTE — PROGRESS NOTES
[facility] Short Stay [facility]  [facility] Medicine  Progress Note    Patient Name: Ene Martin  MRN: [id number]  Patient Class: IP- Inpatient   Admission Date: 6/21/2023  Length of Stay: 1 days  Attending Physician: Jewel Elam MD  Primary Care Provider: Dagoberto Ravi DO        Subjective:     Principal Problem:Symptomatic anemia        HPI:  68 Y/O Female who presented to [facility] ED with CC weakness, dizziness,SOB onset 5 days ago. She reports the weakness and SOB is made worse by activity; better by rest.Pt endorses 1 episode of vomitus 3 days ago, she does not report any blood in it. She endorses frequent black stools. Bedside FOBT is  negative.She has been hospitalized several times in the past for symptomatic anemia/ blood transfusion. She was last hospitalized February 2023 for similar symptoms. She reports EGD, Colonoscopy were done but did not find an active bleeding source.  She reports she can not take IRON supplements PO secondary to GI upset.  She is unsure if she has had Iron transfusion in the past. She was evaluated by her PCP (Dr Ravi) 6/15/23 HH was 6.7/23.8. Pt denies hematemesis, hematochezia, Abd Pain, CP, Fever Chills.     Pt medical hx includes: ARIANA, ITP, HTN, HLD, GERD, Cardiomyopathy.     ED Course   Initial Vitals BP 96/36, HR 92, RR 16, T 98.1 F, SpO2 93%  Labs WBC 8.2, HGB 4.9, HCT 17.5, PLT 97, Na 136, K 4.2, BUN 11, CR 1.23, , PT14.3, INR 1.15  Imaging: CT Head negative, CXR negative  Interventions 1 L NS Bolus    Patient will be admitted to hospital medicine service under the direct supervision of Dr Radha CARDENAS for further evaluation and management.       Overview/Hospital Course:  06/22 - Records reviewed. Admitted with fatigue, SOB and dizziness worse last several days. Developed antibodies and discussed with pt about less incompatible blood. Pt agrees to proceed. Was to get IV iron but didn't take oral iron and missed follow up with Dr Hubbard. No obvious feeding  but continues chronic iron def anemia. Dr Hubbard and Dr Tran asked to see.   Pt medical hx includes: ARIANA, ITP, HTN, HLD, GERD, Cardiomyopathy.       Interval History:     Review of Systems   Constitutional:  Positive for fatigue. Negative for appetite change and fever.   HENT:  Negative for congestion, hearing loss and trouble swallowing.    Respiratory:  Negative for chest tightness, shortness of breath and wheezing.    Cardiovascular:  Negative for chest pain and palpitations.   Gastrointestinal:  Negative for abdominal pain, constipation and nausea.   Genitourinary:  Negative for difficulty urinating and dysuria.   Musculoskeletal:  Negative for back pain and neck stiffness.   Skin:  Negative for pallor and rash.   Neurological:  Positive for dizziness. Negative for speech difficulty and headaches.   Psychiatric/Behavioral:  Negative for confusion and suicidal ideas.    Objective:     Vital Signs (Most Recent):  Temp: 97.5 °F (36.4 °C) (06/22/23 2148)  Pulse: 70 (06/22/23 2148)  Resp: 18 (06/22/23 2148)  BP: (!) 155/63 (06/22/23 2148)  SpO2: 97 % (06/22/23 2148) Vital Signs (24h Range):  Temp:  [97 °F (36.1 °C)-98.3 °F (36.8 °C)] 97.5 °F (36.4 °C)  Pulse:  [70-78] 70  Resp:  [16-18] 18  SpO2:  [93 %-97 %] 97 %  BP: ()/(41-72) 155/63     Weight: 85.3 kg (188 lb)  Body mass index is 32.27 kg/m².    Intake/Output Summary (Last 24 hours) at 6/22/2023 2304  Last data filed at 6/22/2023 2148  Gross per 24 hour   Intake 760.42 ml   Output --   Net 760.42 ml         Physical Exam  Vitals reviewed.   Constitutional:       General: She is awake. She is not in acute distress.     Appearance: She is well-developed. She is obese. She is not toxic-appearing.   HENT:      Head: Normocephalic.      Nose: Nose normal.      Mouth/Throat:      Pharynx: Oropharynx is clear.   Eyes:      Extraocular Movements: Extraocular movements intact.      Pupils: Pupils are equal, round, and reactive to light.   Neck:      Thyroid: No  thyroid mass.      Vascular: No carotid bruit.   Cardiovascular:      Rate and Rhythm: Normal rate and regular rhythm.      Pulses: Normal pulses.      Heart sounds: Normal heart sounds. No murmur heard.  Pulmonary:      Effort: Pulmonary effort is normal.      Breath sounds: Normal breath sounds and air entry. No wheezing.   Abdominal:      General: Bowel sounds are normal. There is no distension.      Palpations: Abdomen is soft.      Tenderness: There is no abdominal tenderness.   Musculoskeletal:         General: Normal range of motion.      Cervical back: Neck supple. No rigidity.   Skin:     General: Skin is warm.      Coloration: Skin is not jaundiced.      Findings: No lesion.   Neurological:      General: No focal deficit present.      Mental Status: She is alert and oriented to person, place, and time.      Cranial Nerves: No cranial nerve deficit.   Psychiatric:         Attention and Perception: Attention normal.         Mood and Affect: Mood normal.         Behavior: Behavior normal. Behavior is cooperative.         Thought Content: Thought content normal.         Cognition and Memory: Cognition normal.           Significant Labs: All pertinent labs within the past 24 hours have been reviewed.  BMP:   Recent Labs   Lab 06/21/23 1844 06/22/23  0318   * 100    139   K 4.2 3.4*    105   CO2 28 27   BUN 11 10   CREATININE 1.23* 1.01   CALCIUM 8.6 7.9*   MG 2.2  --      CBC:   Recent Labs   Lab 06/21/23 1844 06/22/23  0320   WBC 8.25 5.33   HGB 4.9* 4.6*   HCT 17.5* 16.1*   PLT 97* 72*     CMP:   Recent Labs   Lab 06/21/23 1844 06/22/23  0318    139   K 4.2 3.4*    105   CO2 28 27   * 100   BUN 11 10   CREATININE 1.23* 1.01   CALCIUM 8.6 7.9*   PROT 6.6  --    ALBUMIN 3.0*  --    BILITOT 0.4  --    ALKPHOS 78  --    AST 79*  --    ALT 65*  --    ANIONGAP 11 10       Significant Imaging: I have reviewed all pertinent imaging results/findings within the past 24  hours.      Assessment/Plan:      * Symptomatic anemia  Anemia is microcytic most likely secondary to Iron deficiency DDX Celiac disease  Most likely the source of her worsening SOB; ECHO 2/12/23 LVEF 55%  EGD 2/12/23 Imp: No varices, esophagitis, mass, peptic ulcer, AVM, blood/bleeding  Colonoscopy 2/12/23 Imp: No blood/bleeding, mass, AVM, or ulcerated mucosa  Grade I Internal Hemorrhoids     Transfuse 2 Units PRBC goal HGB 8  Iron studies pending  Oxygen Maintain O2 Sat >90%  MIKHAIL for DVT prophylaxis. No anticoagulation due to possible bleed.    Consider GI Consult 2/2 Occasional Black Stool   Consider Capsule endoscopy as outpatient         Cardiomyopathy  Patient reports she has a Cardiomyopathy, no cardiology note found, ECHO 2/12/23 LVEF 55% Indeterminate Left ventricular diastolic function  Patient is currently taking Entresto 24/26 and BB  Will need follow up with Cardiology as outpatient      Gastroesophageal reflux disease  Continue Home Protonix 40 mg BID      History of ITP  Stable and will monitor.  Platelets at 97 K.    Coags are normal.       Alcohol use  CIWA 0  Will give banana bag and monitor closely.  Patient reported has a few drinks daily.    INR is normal, LFTs very mild elevation  Thiamine 500 mg po TID.  Ativan with parameters for withdrawal symptoms    06/23 GI does w/u for cirrhosis    Iron deficiency anemia    Dr Hubbard ordering IV iron    Insomnia  Continue Home trazadone 50 mg PO QHS  Denies RAMYA but might benefit from outpt evaluation    Hypothyroidism  Continue Home Synthroid 125 mcg daily      Hypertension  Hold BB secondary to Soft BP on admission  Continue to monitor          VTE Risk Mitigation (From admission, onward)         Ordered     Place MIKHAIL hose  Until discontinued         06/21/23 2044     IP VTE HIGH RISK PATIENT  Once         06/21/23 2044                Discharge Planning   TO:      Code Status: Full Code   Is the patient medically ready for discharge?:     Reason for  patient still in hospital (select all that apply): Laboratory test, Treatment, Imaging and Consult recommendations  Discharge Plan A: Home                  Jewel Elam MD  Department of Hospital Medicine   [facility]

## 2023-06-23 NOTE — SUBJECTIVE & OBJECTIVE
Interval History:     Review of Systems   Constitutional:  Positive for fatigue. Negative for appetite change and fever.   HENT:  Negative for congestion, hearing loss and trouble swallowing.    Respiratory:  Negative for chest tightness, shortness of breath and wheezing.    Cardiovascular:  Negative for chest pain and palpitations.   Gastrointestinal:  Negative for abdominal pain, constipation and nausea.   Genitourinary:  Negative for difficulty urinating and dysuria.   Musculoskeletal:  Negative for back pain and neck stiffness.   Skin:  Negative for pallor and rash.   Neurological:  Positive for dizziness. Negative for speech difficulty and headaches.   Psychiatric/Behavioral:  Negative for confusion and suicidal ideas.    Objective:     Vital Signs (Most Recent):  Temp: 97.5 °F (36.4 °C) (06/22/23 2148)  Pulse: 70 (06/22/23 2148)  Resp: 18 (06/22/23 2148)  BP: (!) 155/63 (06/22/23 2148)  SpO2: 97 % (06/22/23 2148) Vital Signs (24h Range):  Temp:  [97 °F (36.1 °C)-98.3 °F (36.8 °C)] 97.5 °F (36.4 °C)  Pulse:  [70-78] 70  Resp:  [16-18] 18  SpO2:  [93 %-97 %] 97 %  BP: ()/(41-72) 155/63     Weight: 85.3 kg (188 lb)  Body mass index is 32.27 kg/m².    Intake/Output Summary (Last 24 hours) at 6/22/2023 2304  Last data filed at 6/22/2023 2148  Gross per 24 hour   Intake 760.42 ml   Output --   Net 760.42 ml         Physical Exam  Vitals reviewed.   Constitutional:       General: She is awake. She is not in acute distress.     Appearance: She is well-developed. She is obese. She is not toxic-appearing.   HENT:      Head: Normocephalic.      Nose: Nose normal.      Mouth/Throat:      Pharynx: Oropharynx is clear.   Eyes:      Extraocular Movements: Extraocular movements intact.      Pupils: Pupils are equal, round, and reactive to light.   Neck:      Thyroid: No thyroid mass.      Vascular: No carotid bruit.   Cardiovascular:      Rate and Rhythm: Normal rate and regular rhythm.      Pulses: Normal pulses.       Heart sounds: Normal heart sounds. No murmur heard.  Pulmonary:      Effort: Pulmonary effort is normal.      Breath sounds: Normal breath sounds and air entry. No wheezing.   Abdominal:      General: Bowel sounds are normal. There is no distension.      Palpations: Abdomen is soft.      Tenderness: There is no abdominal tenderness.   Musculoskeletal:         General: Normal range of motion.      Cervical back: Neck supple. No rigidity.   Skin:     General: Skin is warm.      Coloration: Skin is not jaundiced.      Findings: No lesion.   Neurological:      General: No focal deficit present.      Mental Status: She is alert and oriented to person, place, and time.      Cranial Nerves: No cranial nerve deficit.   Psychiatric:         Attention and Perception: Attention normal.         Mood and Affect: Mood normal.         Behavior: Behavior normal. Behavior is cooperative.         Thought Content: Thought content normal.         Cognition and Memory: Cognition normal.           Significant Labs: All pertinent labs within the past 24 hours have been reviewed.  BMP:   Recent Labs   Lab 06/21/23 1844 06/22/23  0318   * 100    139   K 4.2 3.4*    105   CO2 28 27   BUN 11 10   CREATININE 1.23* 1.01   CALCIUM 8.6 7.9*   MG 2.2  --      CBC:   Recent Labs   Lab 06/21/23 1844 06/22/23  0320   WBC 8.25 5.33   HGB 4.9* 4.6*   HCT 17.5* 16.1*   PLT 97* 72*     CMP:   Recent Labs   Lab 06/21/23 1844 06/22/23  0318    139   K 4.2 3.4*    105   CO2 28 27   * 100   BUN 11 10   CREATININE 1.23* 1.01   CALCIUM 8.6 7.9*   PROT 6.6  --    ALBUMIN 3.0*  --    BILITOT 0.4  --    ALKPHOS 78  --    AST 79*  --    ALT 65*  --    ANIONGAP 11 10       Significant Imaging: I have reviewed all pertinent imaging results/findings within the past 24 hours.

## 2023-06-23 NOTE — ASSESSMENT & PLAN NOTE
CIWA 0  Will give banana bag and monitor closely.  Patient reported has a few drinks daily.    INR is normal, LFTs very mild elevation  Thiamine 500 mg po TID.  Ativan with parameters for withdrawal symptoms    06/23 GI w/u for cirrhosis was negative for cirrhosis

## 2023-06-23 NOTE — ASSESSMENT & PLAN NOTE
IV iron given and will need to continue with occasional IV iron as long as remain iron-deficiency since does not tolerate oral iron

## 2023-06-23 NOTE — NURSING
1300-Pt off floor for EGD.    1520-Pt returned from EGD. AAOx4. Swallowing without difficulty. VSS. CL in easy reach.

## 2023-06-23 NOTE — HOSPITAL COURSE
06/22 - Records reviewed. Admitted with fatigue, SOB and dizziness worse last several days. Developed antibodies and discussed with pt about less incompatible blood. Pt agrees to proceed. Was to get IV iron but didn't take oral iron and missed follow up with Dr Hubbard. No obvious feeding but continues chronic iron def anemia. Dr Hubbard and Dr Tran asked to see.   Pt medical hx includes: ARIANA, ITP, HTN, HLD, GERD, Cardiomyopathy.   06/23 - patient feels much better.  No bleeding source seen on upper endoscopy.  GI plans outpatient follow up in office with setting up pill endoscopy study.  Patient is up and about in room and feels much better although still some fatigue.  Discussed with Dr. Hubbrad, with patient's profound anemia she would like to give patient 1 additional unit packed cells especially with continued fatigue.  After transfusion given patient maybe discharged home.  Dr. Hubbard office to contact her on Monday to set up for some follow-up lab and follow-up in office visit.  Told patient she will need continued occasional IV iron therapy since she is not able to tolerate oral iron.  Did confirm with her and she does not eat dirt.  Will plan to let her go home after the above.  She can follow up with Dr. Ravi to help with any coordination needed.  Discussed discharge medication with Dr. Hubbard.    Patient counseled on the importance of keeping all hospital follow up appointments. Stressed compliance with medications, therapies, or devices as prescribed in order to provide for the best health outcomes and decrease the risk of reoccurrence or further progression of issues.    Additionally, patient given written literature regarding their current disease processes and home care recommendations.   Patient given opportunity to ask questions and verbalized understanding of all information discussed.  Reinforced patient's primary care provider can be a big assets in monitoring and organizing issues after  discharge.    To discharge after transfusion

## 2023-06-23 NOTE — DISCHARGE SUMMARY
[facility] - Short Stay Unit  Hospital Medicine  Discharge Summary      Patient Name: Ene Martin  MRN: [id number]  ELIZABETH: [id number]  Patient Class: IP- Inpatient  Admission Date: 6/21/2023  Hospital Length of Stay: 2 days  Discharge Date and Time:  06/23/2023 5:16 PM  Attending Physician: Jewel Elam MD   Discharging Provider: Jewel Elam MD  Primary Care Provider: Dagoberto Ravi DO    Primary Care Team: Networked reference to record PCT     HPI:   68 Y/O Female who presented to [facility] ED with CC weakness, dizziness,SOB onset 5 days ago. She reports the weakness and SOB is made worse by activity; better by rest.Pt endorses 1 episode of vomitus 3 days ago, she does not report any blood in it. She endorses frequent black stools. Bedside FOBT is  negative.She has been hospitalized several times in the past for symptomatic anemia/ blood transfusion. She was last hospitalized February 2023 for similar symptoms. She reports EGD, Colonoscopy were done but did not find an active bleeding source.  She reports she can not take IRON supplements PO secondary to GI upset.  She is unsure if she has had Iron transfusion in the past. She was evaluated by her PCP (Dr Ravi) 6/15/23 HH was 6.7/23.8. Pt denies hematemesis, hematochezia, Abd Pain, CP, Fever Chills.     Pt medical hx includes: ARIANA, ITP, HTN, HLD, GERD, Cardiomyopathy.     ED Course   Initial Vitals BP 96/36, HR 92, RR 16, T 98.1 F, SpO2 93%  Labs WBC 8.2, HGB 4.9, HCT 17.5, PLT 97, Na 136, K 4.2, BUN 11, CR 1.23, , PT14.3, INR 1.15  Imaging: CT Head negative, CXR negative  Interventions 1 L NS Bolus    Patient will be admitted to hospital medicine service under the direct supervision of Dr Radha CARDENAS for further evaluation and management.       * No surgery found *      Hospital Course:   06/22 - Records reviewed. Admitted with fatigue, SOB and dizziness worse last several days. Developed antibodies and discussed with pt about less incompatible  blood. Pt agrees to proceed. Was to get IV iron but didn't take oral iron and missed follow up with Dr Hubbard. No obvious feeding but continues chronic iron def anemia. Dr Hubbard and Dr Tran asked to see.   Pt medical hx includes: ARIANA, ITP, HTN, HLD, GERD, Cardiomyopathy.   06/23 - patient feels much better.  No bleeding source seen on upper endoscopy.  GI plans outpatient follow up in office with setting up pill endoscopy study.  Patient is up and about in room and feels much better although still some fatigue.  Discussed with Dr. Hubbard, with patient's profound anemia she would like to give patient 1 additional unit packed cells especially with continued fatigue.  After transfusion given patient maybe discharged home.  Dr. Hubbard office to contact her on Monday to set up for some follow-up lab and follow-up in office visit.  Told patient she will need continued occasional IV iron therapy since she is not able to tolerate oral iron.  Did confirm with her and she does not eat dirt.  Will plan to let her go home after the above.  She can follow up with Dr. Ravi to help with any coordination needed.  Discussed discharge medication with Dr. Hubbard.    Patient counseled on the importance of keeping all hospital follow up appointments. Stressed compliance with medications, therapies, or devices as prescribed in order to provide for the best health outcomes and decrease the risk of reoccurrence or further progression of issues.    Additionally, patient given written literature regarding their current disease processes and home care recommendations.   Patient given opportunity to ask questions and verbalized understanding of all information discussed.  Reinforced patient's primary care provider can be a big assets in monitoring and organizing issues after discharge.    To discharge after transfusion       Goals of Care Treatment Preferences:  Code Status: Full Code      Consults:   Consults (From admission,  onward)        Status Ordering Provider     Inpatient consult to Hematology/Oncology  Once        Provider:  Tabitha Hubbard MD    Acknowledged MENDY WASHINGTON     Inpatient consult to Gastroenterology  Once        Provider:  ABDIAZIZ Dumont MD    Completed SEDA BECKER          Psychiatric  Alcohol use  CIWA 0  Will give banana bag and monitor closely.  Patient reported has a few drinks daily.    INR is normal, LFTs very mild elevation  Thiamine 500 mg po TID.  Ativan with parameters for withdrawal symptoms    06/23 GI w/u for cirrhosis was negative for cirrhosis    Cardiac/Vascular  Cardiomyopathy  Patient reports she has a Cardiomyopathy, no cardiology note found, ECHO 2/12/23 LVEF 55% Indeterminate Left ventricular diastolic function  Patient is currently taking Entresto 24/26 and BB  Will need follow up with Cardiology as outpatient      Hypertension  Hold BB secondary to Soft BP on admission  Continue to monitor        Oncology  History of ITP  Stable and will monitor.  Platelets at 97 K.    Coags are normal.       Iron deficiency anemia     IV iron given and will need to continue with occasional IV iron as long as remain iron-deficiency since does not tolerate oral iron    Endocrine  Hypothyroidism  Continue Home Synthroid 125 mcg daily      GI  Gastroesophageal reflux disease  Continue Home Protonix 40 mg BID      Other  Insomnia  Continue Home trazadone 50 mg PO QHS  Denies RAMYA but might benefit from outpt evaluation      Final Active Diagnoses:    Diagnosis Date Noted POA    Cardiomyopathy [I42.9] 06/21/2023 Yes    Gastroesophageal reflux disease [K21.9] 06/20/2023 Yes    Alcohol use [Z78.9] 02/12/2023 Yes    History of ITP [Z86.2] 02/12/2023 Not Applicable    Iron deficiency anemia [D50.9] 01/18/2023 Yes    Insomnia [G47.00] 07/19/2022 Yes    Hypothyroidism [E03.9] 07/19/2022 Yes    Hypertension [I10]  Yes      Problems Resolved During this Admission:    Diagnosis Date Noted Date Resolved POA     PRINCIPAL PROBLEM:  Symptomatic anemia [D64.9] 02/12/2023 06/23/2023 Yes    ETOH abuse [F10.10] 10/24/2022 06/21/2023 Yes       Discharged Condition: fair    Disposition:     Follow Up:   Follow-up Information     Dagoberto Ravi DO. Schedule an appointment as soon as possible for a visit in 2 week(s).    Specialty: Family Medicine  Why: recheck H/H  Contact information:  [address]  [facility] [place] [address]  [phone]             ANA Stern. Schedule an appointment as soon as possible for a visit in 1 week(s).    Specialty: Family Medicine  Contact information:  [address] [facility] [address]  [phone]             Tabitha Hubbard MD Follow up.    Specialty: Hematology and Oncology  Why: Dr. Hubbard office will contact patient Monday to come to office to have lab checked, they will make follow-up appointment with patient at that time  Contact information:  [address]  [address] [address]  [phone]             OSCAR BRYANT MD. Schedule an appointment as soon as possible for a visit in 1 month(s).    Specialty: Cardiology  Contact information:  [address] [facility] [address]  [phone]                       Patient Instructions:      Diet Adult Regular     Activity as tolerated       Significant Diagnostic Studies: Labs:   BMP:   Recent Labs   Lab 06/21/23  1844 06/22/23  0318 06/23/23 0222   * 100 215*    139 138   K 4.2 3.4* 4.0    105 104   CO2 28 27 24   BUN 11 10 9   CREATININE 1.23* 1.01 1.21*   CALCIUM 8.6 7.9* 8.3*   MG 2.2  --   --    , CMP   Recent Labs   Lab 06/21/23  1844 06/22/23  0318 06/23/23 0222    139 138   K 4.2 3.4* 4.0    105 104   CO2 28 27 24   * 100 215*   BUN 11 10 9   CREATININE 1.23* 1.01 1.21*   CALCIUM 8.6 7.9* 8.3*   PROT 6.6  --  6.8   ALBUMIN 3.0*  --  3.1*   BILITOT 0.4  --  0.6   ALKPHOS 78  --  79   AST 79*  --  52*   ALT 65*  --  65*   ANIONGAP 11 10 14    and CBC   Recent Labs    Lab 06/21/23  1844 06/22/23  0320 06/23/23  0222   WBC 8.25 5.33 5.86   HGB 4.9* 4.6* 7.5*   HCT 17.5* 16.1* 25.3*   PLT 97* 72* 95*     Imaging Results          X-Ray Chest AP Portable (Final result)  Result time 06/21/23 19:31:24    Final result by Deyvi Calderon II, MD (06/21/23 19:31:24)                 Impression:      No evidence of acute cardiopulmonary disease.      Electronically signed by: Deyvi Calderon  Date:    06/21/2023  Time:    19:31             Narrative:    EXAMINATION:  XR CHEST AP PORTABLE    CLINICAL HISTORY:  Weakness    COMPARISON:  11 February 2023    TECHNIQUE:  XR CHEST AP PORTABLE    FINDINGS:  The heart and mediastinum are normal in size and configuration.  The pulmonary vascularity is normal in caliber.  No lung infiltrates, effusions, pneumothorax or other abnormality is demonstrated.                               CT Head Without Contrast (Final result)  Result time 06/21/23 19:31:01    Final result by Deyvi Calderon II, MD (06/21/23 19:31:01)                 Impression:      No evidence of abnormality demonstrated.      Electronically signed by: Deyvi Calderon  Date:    06/21/2023  Time:    19:31             Narrative:    EXAMINATION:  CT HEAD WITHOUT CONTRAST    CLINICAL HISTORY:  Dizziness, persistent/recurrent, cardiac or vascular cause suspected;    TECHNIQUE:  Axial CT imaging of the brain is performed without contrast with 3 mm increments.    CT dose reduction technique used - Dose Rite and tube current modulation.    COMPARISON:  24 October 2022    FINDINGS:  No evidence of hemorrhage, mass, mass effect, midline shift or acute infarct seen.  The brain parenchyma attenuation and differentiation appears within normal limits. The ventricles and cisterns are normal in caliber.  No cranial or skull base abnormality is identified.                              Intake/Output - Last 3 Shifts       06/21 0700  06/22 0659 06/22 0700  06/23 0659 06/23 0700 06/24 0659    I.V.  (mL/kg)  150 (1.8) 450 (5.3)    Blood  610.4     IV Piggyback 1000  250    Total Intake(mL/kg) 1000 (11.7) 760.4 (8.9) 700 (8.2)    Blood   0    Total Output   0    Net +1000 +760.4 +700               Microbiology Results (last 7 days)     ** No results found for the last 168 hours. **            Pending Diagnostic Studies:     Procedure Component Value Units Date/Time    EKG 12-lead [[id number]] Collected: 06/22/23 0748    Order Status: Sent Lab Status: In process Updated: 06/22/23 0831    Narrative:      Test Reason : R42,    Vent. Rate : 070 BPM     Atrial Rate : 070 BPM     P-R Int : 176 ms          QRS Dur : 148 ms      QT Int : 462 ms       P-R-T Axes : 079 076 103 degrees     QTc Int : 498 ms    Sinus rhythm with occasional Premature ventricular complexes  Left bundle branch block  Abnormal ECG  When compared with ECG of 29-APR-2023 14:19,  PREVIOUS ECG IS PRESENT    Referred By: [email or web address]   SELF           Confirmed By:     Occult blood x 3, stool [[id number]]     Order Status: Sent Lab Status: No result     Specimen: Stool          Medications:  Reconciled Home Medications:      Medication List      START taking these medications    dexAMETHasone 4 MG Tab  Commonly known as: DECADRON  Take 5 tablets (20 mg total) by mouth once daily. for 4 days        CONTINUE taking these medications    BYSTOLIC 5 MG Tab  Generic drug: nebivoloL  Take 5 mg by mouth once daily.     calcium carbonate-vitamin D3 500 mg-3.125 mcg (125 unit) per tablet  Take 1 tablet by mouth once daily.     ENTRESTO 24-26 mg per tablet  Generic drug: sacubitriL-valsartan  Take 1 tablet by mouth 2 (two) times daily.     furosemide 20 MG tablet  Commonly known as: LASIX  Take 2 tablets (40 mg total) by mouth once daily.     pantoprazole 40 MG tablet  Commonly known as: PROTONIX  Take 1 tablet (40 mg total) by mouth 2 (two) times daily.     SYNTHROID 125 MCG tablet  Generic drug: levothyroxine  Take 1 tablet (125 mcg total) by mouth once daily.      traZODone 100 MG tablet  Commonly known as: DESYREL  Take 1 tablet (100 mg total) by mouth every evening.     TRINTELLIX 10 mg Tab  Generic drug: vortioxetine  Take 1 tablet (10 mg total) by mouth once daily.        STOP taking these medications    cholecalciferol (vitamin D3) 125 mcg (5,000 unit) Tab     ferrous sulfate 325 mg (65 mg iron) Tab tablet  Commonly known as: FEOSOL     mupirocin 2 % ointment  Commonly known as: BACTROBAN     potassium chloride 10 MEQ Tbsr  Commonly known as: KLOR-CON            Indwelling Lines/Drains at time of discharge:   Lines/Drains/Airways     None                 Time spent on the discharge of patient: more 30  minutes         Jewel Elam MD  Department of Hospital Medicine  [facility] - Short Stay Unit

## 2023-06-24 NOTE — NURSING
"Educated patient on discharge instructions. Patient alert and oriented x 4. Seated patient in wheelchair and explained to patient to not get out of the wheel chair and to not leave. Patient was irate and stated "do not pull on my wheelchair". Attempted to search for nursing assistant to take the patient to her car. When I came back the patient was not in wheelchair anymore and left before being taken to her car.  "

## 2023-06-24 NOTE — ED PROVIDER NOTES
Encounter Date: 2023       History     Chief Complaint   Patient presents with    Weakness     Pt c/o generalized weakness, nausea, and light headedness.     67 year old female presents to ED with complaint of weakness. Patient reports 2 days of generalized weakness, dizziness, shortness of breath. She had labs drawn by PCP on Thursday 6/15 with H&H of 6.7/23. She reported pica and eating ice. Denies chest pain, nausea/vomiting, diarrhea, fever, chills.     The history is provided by the patient and medical records.   Review of patient's allergies indicates:   Allergen Reactions    Lisinopril Other (See Comments)     thrombocytopenia     Past Medical History:   Diagnosis Date    Anxiety and depression     Cataract     Encounter for blood transfusion 2023    [facility]    ETOH abuse 10/24/2022    GERD (gastroesophageal reflux disease)     History of alcohol abuse     Hyperlipidemia     Hypertension     Hypothyroidism     Idiopathic thrombocytopenic purpura     Insomnia 2022    Iron deficiency anemia 2023    Left bundle branch block     Osteoporosis     Pernicious anemia      Past Surgical History:   Procedure Laterality Date    CATARACT EXTRACTION Bilateral      SECTION      COLONOSCOPY  2023    diagnostic to due blood loss-Dr. Tran-repeat in 1 year    ESOPHAGOGASTRODUODENOSCOPY  2023    HYSTERECTOMY  2003    OOPHORECTOMY      THYROIDECTOMY  2008    vocal cord surgery  2009     Family History   Problem Relation Age of Onset    Lung cancer Father     Osteoporosis Mother     No Known Problems Sister     No Known Problems Brother     Lupus Daughter     No Known Problems Son     No Known Problems Maternal Grandmother     No Known Problems Maternal Grandfather     No Known Problems Paternal Grandmother     No Known Problems Paternal Grandfather      Social History     Tobacco Use    Smoking status: Former     Packs/day: 0.50     Years: 33.00     Pack years: 16.50      "Types: Vaping with nicotine, Cigarettes     Start date:      Quit date:      Years since quittin.4     Passive exposure: Past    Smokeless tobacco: Never    Tobacco comments:     Former cigarette smoker, smoked "off and on" from [id number]. Quit in  then picked up vaping in .    Substance Use Topics    Alcohol use: Yes     Alcohol/week: 10.0 standard drinks     Types: 10 Cans of beer per week     Comment: 2 cans of mixed drinks almost every night    Drug use: Never     Review of Systems   Constitutional:  Negative for chills and fever.   HENT:  Negative for sinus pressure and sinus pain.    Respiratory:  Positive for shortness of breath. Negative for cough.    Cardiovascular:  Negative for chest pain and palpitations.   Gastrointestinal:  Positive for nausea. Negative for vomiting.   Musculoskeletal:  Negative for arthralgias and gait problem.   Skin:  Negative for color change and wound.   Neurological:  Positive for dizziness and weakness.   Psychiatric/Behavioral:  Negative for agitation and confusion.    All other systems reviewed and are negative.    Physical Exam     Initial Vitals [23 1734]   BP Pulse Resp Temp SpO2   (!) 96/36 92 20 98.1 °F (36.7 °C) (!) 93 %      MAP       --         Physical Exam    Nursing note and vitals reviewed.  Constitutional: She appears well-developed and well-nourished.   HENT:   Head: Normocephalic.   Eyes: EOM are normal. Pupils are equal, round, and reactive to light.   Neck: Neck supple.   Normal range of motion.  Cardiovascular:  Normal rate and regular rhythm.           No murmur heard.  Pulmonary/Chest: She has no wheezes. She has no rhonchi.   Abdominal: Abdomen is soft. She exhibits no distension. There is no abdominal tenderness.   Musculoskeletal:         General: No tenderness or edema.      Cervical back: Normal range of motion and neck supple.     Lymphadenopathy:     She has no cervical adenopathy.   Neurological: She is alert and oriented " to person, place, and time. No cranial nerve deficit or sensory deficit.   Skin: Skin is warm and dry. There is pallor.   Psychiatric: She has a normal mood and affect. Thought content normal.       Medical Screening Exam   See Full Note    ED Course   Procedures  Labs Reviewed   COMPREHENSIVE METABOLIC PANEL - Abnormal; Notable for the following components:       Result Value    Glucose 128 (*)     Creatinine 1.23 (*)     Albumin 3.0 (*)     ALT 65 (*)     AST 79 (*)     eGFR 48 (*)     All other components within normal limits   URINALYSIS, REFLEX TO URINE CULTURE - Abnormal; Notable for the following components:    Leukocytes, UA Large (*)     All other components within normal limits   CBC WITH DIFFERENTIAL - Abnormal; Notable for the following components:    RBC 2.30 (*)     Hemoglobin 4.9 (*)     Hematocrit 17.5 (*)     MCV 76.1 (*)     MCH 21.3 (*)     MCHC 28.0 (*)     RDW 21.0 (*)     Platelet Count 97 (*)     Neutrophils % 70.7 (*)     Lymphocytes % 17.0 (*)     Monocytes % 9.8 (*)     Immature Granulocytes % 0.6 (*)     nRBC, Auto 0.6 (*)     Monocytes, Absolute 0.81 (*)     Immature Granulocytes, Absolute 0.05 (*)     nRBC, Absolute 0.05 (*)     All other components within normal limits   MANUAL DIFFERENTIAL - Abnormal; Notable for the following components:    Segmented Neutrophils, Man % 71 (*)     Lymphocytes, Man % 24 (*)     Platelet Morphology Decreased (*)     All other components within normal limits   URINALYSIS, MICROSCOPIC - Abnormal; Notable for the following components:    Bacteria, UA Few (*)     All other components within normal limits   MAGNESIUM - Normal   APTT - Normal   PROTIME-INR - Normal   POCT OCCULT BLOOD (STOOL) - Normal   CBC W/ AUTO DIFFERENTIAL    Narrative:     The following orders were created for panel order CBC auto differential.  Procedure                               Abnormality         Status                     ---------                               -----------          ------                     CBC with Differential[[id number]]        Abnormal            Final result               Manual Differential[[id number]]          Abnormal            Final result                 Please view results for these tests on the individual orders.        ECG Results              EKG 12-lead (In process)  Result time 06/22/23 08:31:23      In process by Interface, Lab In [facility] (06/22/23 08:31:23)                   Narrative:    Test Reason : R42,    Vent. Rate : 070 BPM     Atrial Rate : 070 BPM     P-R Int : 176 ms          QRS Dur : 148 ms      QT Int : 462 ms       P-R-T Axes : 079 076 103 degrees     QTc Int : 498 ms    Sinus rhythm with occasional Premature ventricular complexes  Left bundle branch block  Abnormal ECG  When compared with ECG of 29-APR-2023 14:19,  PREVIOUS ECG IS PRESENT    Referred By: [email or web address]   SELF           Confirmed By:                                   Imaging Results              X-Ray Chest AP Portable (Final result)  Result time 06/21/23 19:31:24      Final result by Deyvi Calderon II, MD (06/21/23 19:31:24)                   Impression:      No evidence of acute cardiopulmonary disease.      Electronically signed by: Deyvi Calderon  Date:    06/21/2023  Time:    19:31               Narrative:    EXAMINATION:  XR CHEST AP PORTABLE    CLINICAL HISTORY:  Weakness    COMPARISON:  11 February 2023    TECHNIQUE:  XR CHEST AP PORTABLE    FINDINGS:  The heart and mediastinum are normal in size and configuration.  The pulmonary vascularity is normal in caliber.  No lung infiltrates, effusions, pneumothorax or other abnormality is demonstrated.                                       CT Head Without Contrast (Final result)  Result time 06/21/23 19:31:01      Final result by Deyvi Calderon II, MD (06/21/23 19:31:01)                   Impression:      No evidence of abnormality demonstrated.      Electronically signed by: Deyvi  Wirosemarie  Date:    06/21/2023  Time:    19:31               Narrative:    EXAMINATION:  CT HEAD WITHOUT CONTRAST    CLINICAL HISTORY:  Dizziness, persistent/recurrent, cardiac or vascular cause suspected;    TECHNIQUE:  Axial CT imaging of the brain is performed without contrast with 3 mm increments.    CT dose reduction technique used - Dose Rite and tube current modulation.    COMPARISON:  24 October 2022    FINDINGS:  No evidence of hemorrhage, mass, mass effect, midline shift or acute infarct seen.  The brain parenchyma attenuation and differentiation appears within normal limits. The ventricles and cisterns are normal in caliber.  No cranial or skull base abnormality is identified.                                       Medications   0.9%  NaCl infusion (for blood administration) ( Intravenous Not Given 6/22/23 1932)   sacubitriL-valsartan 24-26 mg per tablet 1 tablet (1 tablet Oral Given 6/23/23 2144)   levothyroxine tablet 125 mcg (125 mcg Oral Not Given 6/23/23 0900)   sodium chloride 0.9% flush 10 mL (has no administration in time range)   naloxone 0.4 mg/mL injection 0.02 mg (has no administration in time range)   glucagon (human recombinant) injection 1 mg (has no administration in time range)   dextrose 10% bolus 125 mL 125 mL (has no administration in time range)   dextrose 10% bolus 250 mL 250 mL (has no administration in time range)   dextrose 40 % gel 15,000 mg (has no administration in time range)   dextrose 40 % gel 30,000 mg (has no administration in time range)   acetaminophen tablet 650 mg (has no administration in time range)   ondansetron disintegrating tablet 8 mg (8 mg Oral Given 6/23/23 1732)   trazodone split tablet 50 mg (has no administration in time range)   LORazepam injection 1 mg (has no administration in time range)   thiamine (B-1) 500 mg in dextrose 5 % (D5W) 100 mL IVPB (500 mg Intravenous Not Given 6/23/23 2100)   pantoprazole injection 40 mg (40 mg Intravenous Given 6/23/23 2144)    ferric gluconate (FERRLECIT) 125 mg in sodium chloride 0.9% 100 mL IVPB (0 mg Intravenous Stopped 6/23/23 1011)   methylPREDNISolone sodium succinate injection 80 mg (80 mg Intravenous Given 6/23/23 1733)   0.9%  NaCl infusion (for blood administration) (has no administration in time range)   0.9%  NaCl infusion (for blood administration) ( Intravenous Not Given 6/22/23 1943)   0.9%  NaCl infusion (for blood administration) (has no administration in time range)   sodium chloride 0.9% bolus 1,000 mL 1,000 mL (0 mLs Intravenous Stopped 6/21/23 2046)   sodium chloride 0.9% 1,000 mL with mvi, (ADULT) no.4 with vit K 3,300 unit- 150 mcg/10 mL 10 mL, thiamine 100 mg, folic acid 1 mg infusion ( Intravenous New Bag 6/21/23 2234)   diphenhydrAMINE capsule 50 mg (50 mg Oral Given 6/22/23 1535)   acetaminophen tablet 650 mg (650 mg Oral Given 6/22/23 1534)   famotidine tablet 40 mg (40 mg Oral Given 6/22/23 1535)   diphenhydrAMINE capsule 25 mg (25 mg Oral Given 6/23/23 1733)   acetaminophen tablet 650 mg (650 mg Oral Given 6/23/23 1732)   famotidine tablet 40 mg (40 mg Oral Given 6/23/23 1733)     Medical Decision Making:   Initial Assessment:   weakness  Differential Diagnosis:   anemia  Clinical Tests:   Lab Tests: Ordered and Reviewed  Radiological Study: Ordered and Reviewed  Medical Tests: Ordered and Reviewed  ED Management:  MDM    Patient presents for emergent evaluation of acute weakness that poses a threat to life and/or bodily function.    In the ED patient found to have acute weakness, dizziness., anemia, ELSIE  I ordered labs and personally reviewed them.  Labs significant for H&H 4.9/17.5, creatinine 1.23.    I ordered X-rays and personally reviewed them and reviewed the radiologist interpretation.  Xray significant for no acute processes.    I ordered EKG and personally reviewed it.  EKG significant for sinus rhythm with occasional PVCs; left bundle-branch block.    I ordered CT scan and personally reviewed it  and reviewed the radiologist interpretation.  CT significant for no acute processes.      Admission MDM  Patient was managed in the ED with IV normal saline.    The response to treatment was fair.    Patient required emergent consultation to Dr. Garcia (admitting physician) for admission.    came in for 2 days of generalized weakness, dizziness, shortness of breath. She had labs drawn by PCP on Thursday 6/15 with H&H of 6.7/23. She reported pica and eating ice. H&H today 4.9/17.5; creat 1.23 up from 1.04 on yesterday's labs at Dr. Ravi's office. CT, Chest X-Ray, remaining labs unremarkable. Lab called and said she has antibodies that they won't possibly have tonight                       Clinical Impression:   Final diagnoses:  [R53.1] Weakness  [R42] Dizziness  [D64.9] Anemia, unspecified type  [N17.9] ELSIE (acute kidney injury)        ED Disposition Condition    Admit                 ANA Reynolds  06/24/23 0011

## 2023-06-25 LAB
ABO + RH BLD: NORMAL
ABO + RH BLD: NORMAL
BLD PROD TYP BPU: NORMAL
BLD PROD TYP BPU: NORMAL
BLOOD UNIT EXPIRATION DATE: NORMAL
BLOOD UNIT EXPIRATION DATE: NORMAL
BLOOD UNIT TYPE CODE: 5100
BLOOD UNIT TYPE CODE: 5100
CROSSMATCH INTERPRETATION: NORMAL
CROSSMATCH INTERPRETATION: NORMAL
DISPENSE STATUS: NORMAL
DISPENSE STATUS: NORMAL
UNIT NUMBER: NORMAL
UNIT NUMBER: NORMAL

## 2023-06-26 ENCOUNTER — PATIENT OUTREACH (OUTPATIENT)
Dept: ADMINISTRATIVE | Facility: CLINIC | Age: 68
End: 2023-06-26

## 2023-06-26 LAB
DHEA SERPL-MCNC: NORMAL
ESTROGEN SERPL-MCNC: NORMAL PG/ML
INSULIN SERPL-ACNC: NORMAL U[IU]/ML
LAB AP GROSS DESCRIPTION: NORMAL
LAB AP LABORATORY NOTES: NORMAL
T3RU NFR SERPL: NORMAL %

## 2023-06-26 NOTE — PLAN OF CARE
[facility] - Short Stay Unit  Discharge Final Note    Primary Care Provider: Dagoberto Ravi DO    Expected Discharge Date: 6/23/2023    Final Discharge Note (most recent)       Final Note - 06/26/23 0907          Final Note    Assessment Type Final Discharge Note     Anticipated Discharge Disposition Home or Self Care     What phone number can be called within the next 1-3 days to see how you are doing after discharge? [phone]        Post-Acute Status    Discharge Delays None known at this time                     Important Message from Medicare  Important Message from Medicare regarding Discharge Appeal Rights: Signed/date by patient/caregiver     Date IMM was signed: 06/22/23  Time IMM was signed: 1308    Contact Info       Dagoberto Ravi DO   Specialty: Family Medicine   Relationship: PCP - General    [address]  [facility] [address]   Phone: [phone]       Next Steps: Schedule an appointment as soon as possible for a visit in 2 week(s)    Instructions: recheck H/H    ANA Stern   Specialty: Family Medicine    [address] [place] [id number]   Phone: [phone]       Next Steps: Schedule an appointment as soon as possible for a visit in 1 week(s)    Tabitha Hubbard MD   Specialty: Hematology and Oncology    [address] [address]   Phone: [phone]       Next Steps: Follow up    Instructions: Dr. Hubbard office will contact patient Monday to come to office to have lab checked, they will make follow-up appointment with patient at that time    OSCAR BRYANT MD   Specialty: Cardiology    [address] [facility] [address]   Phone: [phone]       Next Steps: Schedule an appointment as soon as possible for a visit in 1 month(s)          Pt discharged home over the weekend, no needs.

## 2023-06-26 NOTE — PROGRESS NOTES
C3 nurse attempted to contact Ene Martin  for a TCC post hospital discharge follow up call. No answer. Left voicemail with callback information. The patient does not have a scheduled HOSFU appointment. Message sent to PCP staff for assistance with scheduling visit with patient.

## 2023-06-27 ENCOUNTER — TELEPHONE (OUTPATIENT)
Dept: SLEEP MEDICINE | Facility: CLINIC | Age: 68
End: 2023-06-27
Payer: COMMERCIAL

## 2023-06-27 NOTE — PROGRESS NOTES
C3 nurse spoke with Ene Martin  for a TCC post hospital discharge follow up call. Nurse offered to scheduled TCC hospital follow-up appointment. The patient declined appointment at this time. Patient states she will schedule appt with pcp after she reviews her calender due to multiple upcoming appts

## 2023-07-27 DIAGNOSIS — R11.0 NAUSEA: Primary | ICD-10-CM

## 2023-07-27 RX ORDER — ONDANSETRON 4 MG/1
4 TABLET, ORALLY DISINTEGRATING ORAL EVERY 8 HOURS PRN
Qty: 30 TABLET | Refills: 1 | Status: SHIPPED | OUTPATIENT
Start: 2023-07-27

## 2023-07-27 NOTE — TELEPHONE ENCOUNTER
"I spoke with patient due to received message that needed zofran for nausea. Patient is able to keep fluids and foods down. She is not vomiting. She reports that she has been having nausea and lightheadedness. She is seeing  every 2 weeks for her anemia. Patient thinks she maybe having some sinus issues and has taken some OTC medicine for "sinuses". Instructed patient that if she gets worse to report to the ER. She is requesting Zofran ODT just for occasional nausea.   "

## 2023-07-27 NOTE — TELEPHONE ENCOUNTER
----- Message from Mirtha Knight sent at 7/26/2023  3:27 PM CDT -----  Contact: [phone]  Patient called wanted to know if she could get some zofran sent into [facility] for the nauseous but wants the kind that melts under her tongue because she cant keep nothing down.

## 2023-11-02 DIAGNOSIS — E03.9 ACQUIRED HYPOTHYROIDISM: ICD-10-CM

## 2023-11-02 DIAGNOSIS — F32.A DEPRESSION, UNSPECIFIED DEPRESSION TYPE: ICD-10-CM

## 2023-11-02 RX ORDER — LEVOTHYROXINE SODIUM 125 UG/1
125 TABLET ORAL DAILY
Qty: 30 TABLET | Refills: 0 | Status: SHIPPED | OUTPATIENT
Start: 2023-11-02

## 2023-11-02 RX ORDER — VORTIOXETINE 10 MG/1
1 TABLET, FILM COATED ORAL DAILY
Qty: 30 TABLET | Refills: 0 | Status: SHIPPED | OUTPATIENT
Start: 2023-11-02 | End: 2023-11-30 | Stop reason: SDUPTHER

## 2023-11-02 NOTE — TELEPHONE ENCOUNTER
Pt called requesting a Rx refill on Trintellix and Synthroid. Notified Pt that she is due for an office visit and lab work. Pt stated that she is out of her Trintellix and only has a few days worth of Synthroid left, requested a 1 month supply and stated she will make an appointment either with this clinic or with Dr. Ravi at the [facility].

## 2023-11-30 ENCOUNTER — OFFICE VISIT (OUTPATIENT)
Dept: FAMILY MEDICINE | Facility: CLINIC | Age: 68
End: 2023-11-30
Payer: MEDICARE

## 2023-11-30 VITALS
DIASTOLIC BLOOD PRESSURE: 80 MMHG | SYSTOLIC BLOOD PRESSURE: 121 MMHG | HEART RATE: 82 BPM | RESPIRATION RATE: 18 BRPM | HEIGHT: 62 IN | BODY MASS INDEX: 34.78 KG/M2 | WEIGHT: 189 LBS | TEMPERATURE: 97 F | OXYGEN SATURATION: 98 %

## 2023-11-30 DIAGNOSIS — F32.A DEPRESSION, UNSPECIFIED DEPRESSION TYPE: ICD-10-CM

## 2023-11-30 DIAGNOSIS — K21.9 GASTROESOPHAGEAL REFLUX DISEASE, UNSPECIFIED WHETHER ESOPHAGITIS PRESENT: ICD-10-CM

## 2023-11-30 DIAGNOSIS — R35.0 INCREASED URINARY FREQUENCY: Primary | ICD-10-CM

## 2023-11-30 DIAGNOSIS — I50.9 CONGESTIVE HEART FAILURE, UNSPECIFIED HF CHRONICITY, UNSPECIFIED HEART FAILURE TYPE: ICD-10-CM

## 2023-11-30 DIAGNOSIS — N39.0 URINARY TRACT INFECTION WITHOUT HEMATURIA, SITE UNSPECIFIED: ICD-10-CM

## 2023-11-30 PROCEDURE — 99213 OFFICE O/P EST LOW 20 MIN: CPT | Mod: ,,, | Performed by: FAMILY MEDICINE

## 2023-11-30 PROCEDURE — 99213 PR OFFICE/OUTPT VISIT, EST, LEVL III, 20-29 MIN: ICD-10-PCS | Mod: ,,, | Performed by: FAMILY MEDICINE

## 2023-11-30 PROCEDURE — 81003 URINALYSIS AUTO W/O SCOPE: CPT | Mod: RHCUB | Performed by: FAMILY MEDICINE

## 2023-11-30 RX ORDER — NEOMYCIN SULFATE, POLYMYXIN B SULFATE AND DEXAMETHASONE 3.5; 10000; 1 MG/ML; [USP'U]/ML; MG/ML
1 SUSPENSION/ DROPS OPHTHALMIC 2 TIMES DAILY
COMMUNITY
Start: 2023-11-17 | End: 2024-03-11 | Stop reason: ALTCHOICE

## 2023-11-30 RX ORDER — NEBIVOLOL HYDROCHLORIDE 5 MG/1
5 TABLET ORAL DAILY
Qty: 30 TABLET | Refills: 3 | Status: SHIPPED | OUTPATIENT
Start: 2023-11-30

## 2023-11-30 RX ORDER — SACUBITRIL AND VALSARTAN 24; 26 MG/1; MG/1
1 TABLET, FILM COATED ORAL 2 TIMES DAILY
Status: CANCELLED | OUTPATIENT
Start: 2023-11-30

## 2023-11-30 RX ORDER — SACUBITRIL AND VALSARTAN 24; 26 MG/1; MG/1
1 TABLET, FILM COATED ORAL 2 TIMES DAILY
Qty: 180 TABLET | Refills: 0 | Status: SHIPPED | OUTPATIENT
Start: 2023-11-30 | End: 2024-02-28

## 2023-11-30 RX ORDER — PANTOPRAZOLE SODIUM 40 MG/1
40 TABLET, DELAYED RELEASE ORAL 2 TIMES DAILY
Qty: 30 TABLET | Refills: 5 | Status: SHIPPED | OUTPATIENT
Start: 2023-11-30 | End: 2024-02-22 | Stop reason: SDUPTHER

## 2023-11-30 RX ORDER — VORTIOXETINE 10 MG/1
1 TABLET, FILM COATED ORAL DAILY
Qty: 30 TABLET | Refills: 3 | Status: SHIPPED | OUTPATIENT
Start: 2023-11-30 | End: 2024-02-22 | Stop reason: ALTCHOICE

## 2023-11-30 NOTE — PROGRESS NOTES
COVID-19 Vaccine (1)  TETANUS VACCINE  DEXA Scan  Shingles Vaccine (1 of 2)  2 more care gaps   Patient declined due to time constraints

## 2023-12-04 PROCEDURE — 87186 CULTURE, URINE: ICD-10-PCS | Mod: ,,, | Performed by: CLINICAL MEDICAL LABORATORY

## 2023-12-04 PROCEDURE — 87077 CULTURE AEROBIC IDENTIFY: CPT | Mod: ,,, | Performed by: CLINICAL MEDICAL LABORATORY

## 2023-12-04 PROCEDURE — 87086 CULTURE, URINE: ICD-10-PCS | Mod: ,,, | Performed by: CLINICAL MEDICAL LABORATORY

## 2023-12-04 PROCEDURE — 87086 URINE CULTURE/COLONY COUNT: CPT | Mod: ,,, | Performed by: CLINICAL MEDICAL LABORATORY

## 2023-12-04 PROCEDURE — 87186 SC STD MICRODIL/AGAR DIL: CPT | Mod: ,,, | Performed by: CLINICAL MEDICAL LABORATORY

## 2023-12-04 PROCEDURE — 87077 CULTURE, URINE: ICD-10-PCS | Mod: ,,, | Performed by: CLINICAL MEDICAL LABORATORY

## 2023-12-06 LAB
BILIRUB SERPL-MCNC: NEGATIVE MG/DL
BLOOD URINE, POC: NEGATIVE
COLOR, POC UA: YELLOW
GLUCOSE UR QL STRIP: NEGATIVE
KETONES UR QL STRIP: NEGATIVE
LEUKOCYTE ESTERASE URINE, POC: NORMAL
NITRITE, POC UA: NEGATIVE
PH, POC UA: 7.5
PROTEIN, POC: NEGATIVE
SPECIFIC GRAVITY, POC UA: 1.02
UROBILINOGEN, POC UA: 0.2

## 2023-12-07 LAB — UA COMPLETE W REFLEX CULTURE PNL UR: ABNORMAL

## 2023-12-07 RX ORDER — CEFUROXIME AXETIL 500 MG/1
250 TABLET ORAL EVERY 12 HOURS
Qty: 10 TABLET | Refills: 0 | Status: SHIPPED | OUTPATIENT
Start: 2023-12-07 | End: 2023-12-17

## 2023-12-09 DIAGNOSIS — Z71.89 COMPLEX CARE COORDINATION: ICD-10-CM

## 2023-12-29 PROBLEM — N39.0 URINARY TRACT INFECTION WITHOUT HEMATURIA: Status: ACTIVE | Noted: 2023-12-29

## 2023-12-29 NOTE — ASSESSMENT & PLAN NOTE
Urinalysis and urine culture ordered.  Management contingent upon results.  Patient advised to call, return to clinic or present to the ED should symptoms persist or worsen.  Patient agrees and with the plan of care.

## 2023-12-29 NOTE — PROGRESS NOTES
Ye Barnett DO   [facility]  [address] [address] [address]  [phone]      PATIENT NAME: Ene Martin  : 1955  DATE: 23  MRN: [id number]      Billing Provider: Ye Barnett DO  Level of Service:   Patient PCP Information       Provider PCP Type    [facility]            Reason for Visit / Chief Complaint: Urinary Frequency (Ene Martin 67 year old female presents with urinary frequency/urgency.Onset 2 days. No painful urination or fever. Lower abdominal heaviness also noted. No OTC medications taken for symptoms) and Medication Refill (She would like to have all medications refilled today if possible.)       Update PCP  Update Chief Complaint         History of Present Illness / Problem Focused Workflow     Ene Martin presents to the clinic with Urinary Frequency (Ene Martin 67 year old female presents with urinary frequency/urgency.Onset 2 days. No painful urination or fever. Lower abdominal heaviness also noted. No OTC medications taken for symptoms) and Medication Refill (She would like to have all medications refilled today if possible.)     HPI as noted.  Patient denies hematuria or vaginal discharge.    Urinary Frequency   Associated symptoms include frequency. Pertinent negatives include no chills, hematuria, nausea, vomiting, constipation or rash.   Medication Refill  Pertinent negatives include no abdominal pain, chest pain, chills, congestion, diaphoresis, fever, headaches, nausea, rash, sore throat or vomiting.       Review of Systems     Review of Systems   Constitutional:  Negative for chills, diaphoresis and fever.   HENT:  Negative for congestion and sore throat.    Respiratory:  Negative for shortness of breath.    Cardiovascular:  Negative for chest pain and palpitations.   Gastrointestinal:  Negative for abdominal pain, constipation, diarrhea, nausea and vomiting.   Genitourinary:  Positive for  frequency. Negative for dysuria and hematuria.   Skin:  Negative for rash.   Neurological:  Negative for dizziness, light-headedness and headaches.       Medical / Social / Family History     Past Medical History:   Diagnosis Date    Anxiety and depression     Cataract     Encounter for blood transfusion 2023    [facility]    ETOH abuse 10/24/2022    GERD (gastroesophageal reflux disease)     History of alcohol abuse     Hyperlipidemia     Hypertension     Hypothyroidism     Idiopathic thrombocytopenic purpura     Insomnia 2022    Iron deficiency anemia 2023    Left bundle branch block     Osteoporosis     Pernicious anemia        Past Surgical History:   Procedure Laterality Date    CATARACT EXTRACTION Bilateral      SECTION      COLONOSCOPY  2023    diagnostic to due blood loss-Dr. Tran-repeat in 1 year    ESOPHAGOGASTRODUODENOSCOPY  2023    HYSTERECTOMY      OOPHORECTOMY      THYROIDECTOMY      vocal cord surgery         Social History  Ms. Martin  reports that she has been smoking cigarettes and vaping with nicotine. She started smoking about 52 years ago. She has a 16.5 pack-year smoking history. She has been exposed to tobacco smoke. She has never used smokeless tobacco. She reports current alcohol use of about 10.0 standard drinks of alcohol per week. She reports that she does not use drugs.    Family History  Ms.'s Martin family history includes Lung cancer in her father; Lupus in her daughter; No Known Problems in her brother, maternal grandfather, maternal grandmother, paternal grandfather, paternal grandmother, sister, and son; Osteoporosis in her mother.    Medications and Allergies     Medications  Outpatient Medications Marked as Taking for the 23 encounter (Office Visit) with Ye Barnett, DO   Medication Sig Dispense Refill    calcium carbonate-vitamin D3 500 mg-3.125 mcg (125 unit) per tablet Take 1 tablet by mouth once daily.       furosemide (LASIX) 20 MG tablet Take 2 tablets (40 mg total) by mouth once daily. 30 tablet 5    neomycin-polymyxin-dexamethasone (MAXITROL) 3.5mg/mL-10,000 unit/mL-0.1 % DrpS Place 1 drop into both eyes 2 (two) times daily.      ondansetron (ZOFRAN-ODT) 4 MG TbDL Take 1 tablet (4 mg total) by mouth every 8 (eight) hours as needed (nausea). 30 tablet 1    SYNTHROID 125 mcg tablet Take 1 tablet (125 mcg total) by mouth once daily. 30 tablet 0    traZODone (DESYREL) 100 MG tablet Take 1 tablet (100 mg total) by mouth every evening. 30 tablet 11    [DISCONTINUED] BYSTOLIC 5 mg Tab Take 5 mg by mouth once daily.      [DISCONTINUED] ENTRESTO 24-26 mg per tablet Take 1 tablet by mouth 2 (two) times daily.      [DISCONTINUED] vortioxetine (TRINTELLIX) 10 mg Tab Take 1 tablet (10 mg total) by mouth once daily. 30 tablet 0       Allergies  Review of patient's allergies indicates:   Allergen Reactions    Lisinopril Other (See Comments)     thrombocytopenia       Physical Examination     Vitals:    11/30/23 1557   BP: 121/80   Pulse: 82   Resp: 18   Temp: 97.1 °F (36.2 °C)     Physical Exam  Vitals and nursing note reviewed.   Constitutional:       General: She is not in acute distress.     Appearance: She is not ill-appearing, toxic-appearing or diaphoretic.   HENT:      Head: Normocephalic and atraumatic.      Right Ear: External ear normal.      Left Ear: External ear normal.      Mouth/Throat:      Mouth: Mucous membranes are moist.      Pharynx: No oropharyngeal exudate or posterior oropharyngeal erythema.   Eyes:      General: No scleral icterus.        Right eye: No discharge.         Left eye: No discharge.      Extraocular Movements: Extraocular movements intact.      Conjunctiva/sclera: Conjunctivae normal.      Pupils: Pupils are equal, round, and reactive to light.   Neck:      Vascular: No carotid bruit.   Cardiovascular:      Rate and Rhythm: Normal rate and regular rhythm.      Heart sounds: No murmur  heard.     No friction rub. No gallop.   Pulmonary:      Effort: No respiratory distress.      Breath sounds: No stridor. No wheezing, rhonchi or rales.   Abdominal:      General: Abdomen is flat.      Tenderness: There is no abdominal tenderness. There is no guarding.   Musculoskeletal:         General: No swelling.      Right lower leg: No edema.      Left lower leg: No edema.   Skin:     General: Skin is warm and dry.      Coloration: Skin is not jaundiced.   Neurological:      Mental Status: She is alert and oriented to person, place, and time.         Assessment and Plan (including Health Maintenance)      Problem List  Smart Sets  Document Outside HM   :    Plan:         Health Maintenance Due   Topic Date Due    COVID-19 Vaccine (1) Never done    TETANUS VACCINE  Never done    DEXA Scan  Never done    Shingles Vaccine (1 of 2) Never done    RSV Vaccine (Age 60+ and Pregnant patients) (1 - 1-dose 60+ series) Never done    Pneumococcal Vaccines (Age 65+) (2 - PPSV23 or PCV20) 05/25/2015    Mammogram  03/20/2024       Problem List Items Addressed This Visit          Psychiatric    Depression    Relevant Medications    vortioxetine (TRINTELLIX) 10 mg Tab       Renal/    Urinary tract infection without hematuria    Current Assessment & Plan     Urinalysis and urine culture ordered.  Management contingent upon results.  Patient advised to call, return to clinic or present to the ED should symptoms persist or worsen.  Patient agrees and with the plan of care.            GI    Gastroesophageal reflux disease    Relevant Medications    pantoprazole (PROTONIX) 40 MG tablet     Other Visit Diagnoses       Increased urinary frequency    -  Primary    Relevant Orders    POCT URINALYSIS W/O SCOPE (Completed)    Urine culture (Completed)    Congestive heart failure, unspecified HF chronicity, unspecified heart failure type        Relevant Medications    ENTRESTO 24-26 mg per tablet            Health Maintenance Topics  with due status: Not Due       Topic Last Completion Date    Lipid Panel 02/12/2023    Colorectal Cancer Screening 02/13/2023    Hemoglobin A1c (Diabetic Prevention Screening) 02/20/2023       Future Appointments   Date Time Provider Department Center   3/25/2024 12:45 PM [facility] Matthew MONTILLA Radha            Signature:  DO MATTHEW [facility] [address] [place] [address]  [phone]    Date of encounter: 11/30/23

## 2024-01-01 ENCOUNTER — HOSPITAL ENCOUNTER (OUTPATIENT)
Dept: RADIOLOGY | Facility: HOSPITAL | Age: 69
Discharge: HOME | End: 2024-04-09
Attending: FAMILY MEDICINE
Payer: MEDICARE

## 2024-01-01 ENCOUNTER — OFFICE VISIT (OUTPATIENT)
Dept: FAMILY MEDICINE | Facility: CLINIC | Age: 69
End: 2024-01-01
Payer: MEDICARE

## 2024-01-01 ENCOUNTER — TELEPHONE (OUTPATIENT)
Dept: FAMILY MEDICINE | Facility: CLINIC | Age: 69
End: 2024-01-01
Payer: MEDICARE

## 2024-01-01 ENCOUNTER — HOSPITAL ENCOUNTER (OUTPATIENT)
Dept: CARDIOLOGY | Facility: HOSPITAL | Age: 69
Discharge: HOME | End: 2024-04-24
Attending: FAMILY MEDICINE
Payer: MEDICARE

## 2024-01-01 VITALS
WEIGHT: 152.81 LBS | BODY MASS INDEX: 28.12 KG/M2 | DIASTOLIC BLOOD PRESSURE: 68 MMHG | SYSTOLIC BLOOD PRESSURE: 102 MMHG | HEIGHT: 62 IN | TEMPERATURE: 98 F | HEART RATE: 86 BPM | OXYGEN SATURATION: 98 %

## 2024-01-01 VITALS
RESPIRATION RATE: 20 BRPM | HEART RATE: 88 BPM | WEIGHT: 154.81 LBS | DIASTOLIC BLOOD PRESSURE: 68 MMHG | HEIGHT: 62 IN | TEMPERATURE: 99 F | SYSTOLIC BLOOD PRESSURE: 102 MMHG | BODY MASS INDEX: 28.49 KG/M2 | OXYGEN SATURATION: 100 %

## 2024-01-01 VITALS
WEIGHT: 160.38 LBS | HEIGHT: 62 IN | OXYGEN SATURATION: 95 % | RESPIRATION RATE: 19 BRPM | TEMPERATURE: 98 F | HEART RATE: 66 BPM | SYSTOLIC BLOOD PRESSURE: 104 MMHG | DIASTOLIC BLOOD PRESSURE: 71 MMHG | BODY MASS INDEX: 29.51 KG/M2

## 2024-01-01 DIAGNOSIS — M81.0 AGE-RELATED OSTEOPOROSIS WITHOUT CURRENT PATHOLOGICAL FRACTURE: ICD-10-CM

## 2024-01-01 DIAGNOSIS — D50.9 IRON DEFICIENCY ANEMIA, UNSPECIFIED IRON DEFICIENCY ANEMIA TYPE: ICD-10-CM

## 2024-01-01 DIAGNOSIS — I10 PRIMARY HYPERTENSION: ICD-10-CM

## 2024-01-01 DIAGNOSIS — R90.89 OTHER ABNORMAL FINDINGS ON DIAGNOSTIC IMAGING OF CENTRAL NERVOUS SYSTEM: ICD-10-CM

## 2024-01-01 DIAGNOSIS — Z13.31 POSITIVE DEPRESSION SCREENING: ICD-10-CM

## 2024-01-01 DIAGNOSIS — F32.A DEPRESSION, UNSPECIFIED DEPRESSION TYPE: ICD-10-CM

## 2024-01-01 DIAGNOSIS — R55 SYNCOPE, UNSPECIFIED SYNCOPE TYPE: Primary | ICD-10-CM

## 2024-01-01 DIAGNOSIS — R55 SYNCOPE AND COLLAPSE: ICD-10-CM

## 2024-01-01 DIAGNOSIS — E03.9 HYPOTHYROIDISM, UNSPECIFIED TYPE: ICD-10-CM

## 2024-01-01 DIAGNOSIS — Z12.31 ENCOUNTER FOR SCREENING MAMMOGRAM FOR MALIGNANT NEOPLASM OF BREAST: Primary | ICD-10-CM

## 2024-01-01 DIAGNOSIS — K29.20 CHRONIC ALCOHOLIC GASTRITIS WITHOUT HEMORRHAGE: ICD-10-CM

## 2024-01-01 DIAGNOSIS — R55 SYNCOPE, UNSPECIFIED SYNCOPE TYPE: ICD-10-CM

## 2024-01-01 DIAGNOSIS — Z12.31 ENCOUNTER FOR SCREENING MAMMOGRAM FOR BREAST CANCER: Primary | ICD-10-CM

## 2024-01-01 DIAGNOSIS — E78.2 MIXED HYPERLIPIDEMIA: ICD-10-CM

## 2024-01-01 DIAGNOSIS — R26.81 UNSTEADINESS ON FEET: ICD-10-CM

## 2024-01-01 DIAGNOSIS — Z78.9 ALCOHOL USE: ICD-10-CM

## 2024-01-01 DIAGNOSIS — E03.9 ACQUIRED HYPOTHYROIDISM: ICD-10-CM

## 2024-01-01 DIAGNOSIS — F10.10 ETOH ABUSE: ICD-10-CM

## 2024-01-01 DIAGNOSIS — K21.9 GASTROESOPHAGEAL REFLUX DISEASE, UNSPECIFIED WHETHER ESOPHAGITIS PRESENT: ICD-10-CM

## 2024-01-01 DIAGNOSIS — F10.10 ALCOHOL ABUSE: ICD-10-CM

## 2024-01-01 LAB
ALBUMIN SERPL BCP-MCNC: 3.1 G/DL (ref 3.5–5)
ALBUMIN SERPL BCP-MCNC: 3.2 G/DL (ref 3.5–5)
ALBUMIN/GLOB SERPL: 0.8 {RATIO}
ALBUMIN/GLOB SERPL: 0.8 {RATIO}
ALP SERPL-CCNC: 143 U/L (ref 55–142)
ALP SERPL-CCNC: 155 U/L (ref 55–142)
ALT SERPL W P-5'-P-CCNC: 56 U/L (ref 13–56)
ALT SERPL W P-5'-P-CCNC: 56 U/L (ref 13–56)
ANION GAP SERPL CALCULATED.3IONS-SCNC: 12 MMOL/L (ref 7–16)
ANION GAP SERPL CALCULATED.3IONS-SCNC: 12 MMOL/L (ref 7–16)
AORTIC ROOT ANNULUS: 2.42 CM
AORTIC VALVE CUSP SEPERATION: 1.44 CM
AST SERPL W P-5'-P-CCNC: 104 U/L (ref 15–37)
AST SERPL W P-5'-P-CCNC: 89 U/L (ref 15–37)
AV INDEX (PROSTH): 0.79
AV MEAN GRADIENT: 3 MMHG
AV PEAK GRADIENT: 4 MMHG
AV VALVE AREA BY VELOCITY RATIO: 4.7 CM²
AV VALVE AREA: 3.32 CM²
AV VELOCITY RATIO: 1.12
BASOPHILS # BLD AUTO: 0.05 K/UL (ref 0–0.2)
BASOPHILS # BLD AUTO: 0.06 K/UL (ref 0–0.2)
BASOPHILS NFR BLD AUTO: 0.9 % (ref 0–1)
BASOPHILS NFR BLD AUTO: 0.9 % (ref 0–1)
BILIRUB SERPL-MCNC: 1.1 MG/DL (ref ?–1.2)
BILIRUB SERPL-MCNC: 1.4 MG/DL (ref ?–1.2)
BUN SERPL-MCNC: 10 MG/DL (ref 7–18)
BUN SERPL-MCNC: 9 MG/DL (ref 7–18)
BUN/CREAT SERPL: 6 (ref 6–20)
BUN/CREAT SERPL: 7 (ref 6–20)
CALCIUM SERPL-MCNC: 8 MG/DL (ref 8.5–10.1)
CALCIUM SERPL-MCNC: 8.8 MG/DL (ref 8.5–10.1)
CHLORIDE SERPL-SCNC: 92 MMOL/L (ref 98–107)
CHLORIDE SERPL-SCNC: 98 MMOL/L (ref 98–107)
CHOLEST SERPL-MCNC: 146 MG/DL (ref 0–200)
CHOLEST/HDLC SERPL: 3.3 {RATIO}
CO2 SERPL-SCNC: 30 MMOL/L (ref 21–32)
CO2 SERPL-SCNC: 34 MMOL/L (ref 21–32)
CREAT SERPL-MCNC: 1.25 MG/DL (ref 0.55–1.02)
CREAT SERPL-MCNC: 1.65 MG/DL (ref 0.55–1.02)
CRP SERPL-MCNC: 0.94 MG/DL (ref 0–0.8)
CV ECHO LV RWT: 0.86 CM
DIFFERENTIAL METHOD BLD: ABNORMAL
DIFFERENTIAL METHOD BLD: ABNORMAL
DOP CALC AO PEAK VEL: 0.98 M/S
DOP CALC AO VTI: 22.6 CM
DOP CALC LVOT AREA: 4.2 CM2
DOP CALC LVOT DIAMETER: 2.31 CM
DOP CALC LVOT PEAK VEL: 1.1 M/S
DOP CALC LVOT STROKE VOLUME: 74.98 CM3
DOP CALCLVOT PEAK VEL VTI: 17.9 CM
E WAVE DECELERATION TIME: 213.47 MSEC
E/A RATIO: 0.62
E/E' RATIO: 9.71 M/S
ECHO LV POSTERIOR WALL: 1.54 CM (ref 0.6–1.1)
EGFR (NO RACE VARIABLE) (RUSH/TITUS): 34 ML/MIN/1.73M2
EGFR (NO RACE VARIABLE) (RUSH/TITUS): 47 ML/MIN/1.73M2
EJECTION FRACTION: 60 %
EKG 12-LEAD: ABNORMAL
EOSINOPHIL # BLD AUTO: 0.04 K/UL (ref 0–0.5)
EOSINOPHIL # BLD AUTO: 0.04 K/UL (ref 0–0.5)
EOSINOPHIL NFR BLD AUTO: 0.6 % (ref 1–4)
EOSINOPHIL NFR BLD AUTO: 0.7 % (ref 1–4)
ERYTHROCYTE [DISTWIDTH] IN BLOOD BY AUTOMATED COUNT: 13.6 % (ref 11.5–14.5)
ERYTHROCYTE [DISTWIDTH] IN BLOOD BY AUTOMATED COUNT: 14.2 % (ref 11.5–14.5)
ERYTHROCYTE [SEDIMENTATION RATE] IN BLOOD BY WESTERGREN METHOD: 6 MM/HR (ref 0–30)
FOLATE SERPL-MCNC: 17.1 NG/ML (ref 3.1–17.5)
FRACTIONAL SHORTENING: 24 % (ref 28–44)
GGT SERPL-CCNC: 1045 U/L (ref 5–55)
GLOBULIN SER-MCNC: 3.8 G/DL (ref 2–4)
GLOBULIN SER-MCNC: 4.2 G/DL (ref 2–4)
GLUCOSE SERPL-MCNC: 116 MG/DL (ref 74–106)
GLUCOSE SERPL-MCNC: 90 MG/DL (ref 74–106)
HCT VFR BLD AUTO: 35.6 % (ref 38–47)
HCT VFR BLD AUTO: 36.6 % (ref 38–47)
HDLC SERPL-MCNC: 44 MG/DL (ref 40–60)
HGB BLD-MCNC: 11.8 G/DL (ref 12–16)
HGB BLD-MCNC: 13 G/DL (ref 12–16)
IMM GRANULOCYTES # BLD AUTO: 0.01 K/UL (ref 0–0.04)
IMM GRANULOCYTES # BLD AUTO: 0.02 K/UL (ref 0–0.04)
IMM GRANULOCYTES NFR BLD: 0.2 % (ref 0–0.4)
IMM GRANULOCYTES NFR BLD: 0.3 % (ref 0–0.4)
INTERVENTRICULAR SEPTUM: 1.48 CM (ref 0.6–1.1)
IVC DIAMETER: 0.84 CM
LDLC SERPL CALC-MCNC: 80 MG/DL
LDLC/HDLC SERPL: 1.8 {RATIO}
LEFT ATRIUM SIZE: 3.6 CM
LEFT ATRIUM VOLUME MOD: 47.17 CM3
LEFT INTERNAL DIMENSION IN SYSTOLE: 2.73 CM (ref 2.1–4)
LEFT VENTRICLE DIASTOLIC VOLUME: 53.36 ML
LEFT VENTRICLE SYSTOLIC VOLUME: 27.7 ML
LEFT VENTRICULAR INTERNAL DIMENSION IN DIASTOLE: 3.57 CM (ref 3.5–6)
LEFT VENTRICULAR MASS: 200.86 G
LV LATERAL E/E' RATIO: 7.56 M/S
LV SEPTAL E/E' RATIO: 13.6 M/S
LVOT MG: 2.07 MMHG
LVOT MV: 0.7 CM/S
LYMPHOCYTES # BLD AUTO: 1.26 K/UL (ref 1–4.8)
LYMPHOCYTES # BLD AUTO: 1.36 K/UL (ref 1–4.8)
LYMPHOCYTES NFR BLD AUTO: 20.7 % (ref 27–41)
LYMPHOCYTES NFR BLD AUTO: 22.2 % (ref 27–41)
MACROCYTES BLD QL SMEAR: ABNORMAL
MCH RBC QN AUTO: 33 PG (ref 27–31)
MCH RBC QN AUTO: 33.7 PG (ref 27–31)
MCHC RBC AUTO-ENTMCNC: 33.1 G/DL (ref 32–36)
MCHC RBC AUTO-ENTMCNC: 35.5 G/DL (ref 32–36)
MCV RBC AUTO: 94.8 FL (ref 80–96)
MCV RBC AUTO: 99.4 FL (ref 80–96)
MONOCYTES # BLD AUTO: 0.55 K/UL (ref 0–0.8)
MONOCYTES # BLD AUTO: 0.59 K/UL (ref 0–0.8)
MONOCYTES NFR BLD AUTO: 10.4 % (ref 2–6)
MONOCYTES NFR BLD AUTO: 8.4 % (ref 2–6)
MPC BLD CALC-MCNC: 15.6 FL (ref 9.4–12.4)
MPC BLD CALC-MCNC: ABNORMAL G/DL
MV PEAK A VEL: 1.1 M/S
MV PEAK E VEL: 0.68 M/S
MV STENOSIS PRESSURE HALF TIME: 61.91 MS
MV VALVE AREA P 1/2 METHOD: 3.55 CM2
NEUTROPHILS # BLD AUTO: 3.72 K/UL (ref 1.8–7.7)
NEUTROPHILS # BLD AUTO: 4.53 K/UL (ref 1.8–7.7)
NEUTROPHILS NFR BLD AUTO: 65.6 % (ref 53–65)
NEUTROPHILS NFR BLD AUTO: 69.1 % (ref 53–65)
NONHDLC SERPL-MCNC: 102 MG/DL
NRBC # BLD AUTO: 0 X10E3/UL
NRBC # BLD AUTO: 0 X10E3/UL
NRBC, AUTO (.00): 0 %
NRBC, AUTO (.00): 0 %
OHS CV EVENT MONITOR DAY: 0
OHS CV HOLTER LENGTH DECIMAL HOURS: 24
OHS CV HOLTER LENGTH HOURS: 24
OHS CV HOLTER LENGTH MINUTES: 0
OHS CV HOLTER SINUS AVERAGE HR: 89
OHS CV HOLTER SINUS MAX HR: 149
OHS CV HOLTER SINUS MIN HR: 73
OHS CV RV/LV RATIO: 0.36 CM
PATH REV BLD -IMP: NORMAL
PISA MRMAX VEL: 2.05 M/S
PISA TR MAX VEL: 3.27 M/S
PLATELET # BLD AUTO: 63 K/UL (ref 150–400)
PLATELET # BLD AUTO: 82 K/UL (ref 150–400)
PLATELET MORPHOLOGY: ABNORMAL
PLATELET MORPHOLOGY: ABNORMAL
POTASSIUM SERPL-SCNC: 3.2 MMOL/L (ref 3.5–5.1)
POTASSIUM SERPL-SCNC: 3.7 MMOL/L (ref 3.5–5.1)
PR INTERVAL: 150
PROT SERPL-MCNC: 6.9 G/DL (ref 6.4–8.2)
PROT SERPL-MCNC: 7.4 G/DL (ref 6.4–8.2)
PRT AXES: ABNORMAL
PV PEAK GRADIENT: 3 MMHG
PV PEAK VELOCITY: 0.92 M/S
QRS DURATION: 135
QT/QTC: ABNORMAL
RA PRESSURE ESTIMATED: 3 MMHG
RA VOL SYS: 12.73 ML
RBC # BLD AUTO: 3.58 M/UL (ref 4.2–5.4)
RBC # BLD AUTO: 3.86 M/UL (ref 4.2–5.4)
RBC MORPH BLD: NORMAL
RIGHT ATRIAL AREA: 7.3 CM2
RIGHT VENTRICULAR END-DIASTOLIC DIMENSION: 1.3 CM
RIGHT VENTRICULAR LENGTH IN DIASTOLE (APICAL 4-CHAMBER VIEW): 5.62 CM
RV MID DIAMA: 1.21 CM
RV TB RVSP: 6 MMHG
SODIUM SERPL-SCNC: 135 MMOL/L (ref 136–145)
SODIUM SERPL-SCNC: 136 MMOL/L (ref 136–145)
T4 FREE SERPL-MCNC: 1.87 NG/DL (ref 0.76–1.46)
T4 SERPL-MCNC: 13.6 ΜG/DL (ref 4.8–13.9)
TARGETS BLD QL SMEAR: ABNORMAL
TDI LATERAL: 0.09 M/S
TDI SEPTAL: 0.05 M/S
TDI: 0.07 M/S
TR MAX PG: 43 MMHG
TRICUSPID ANNULAR PLANE SYSTOLIC EXCURSION: 1.53 CM
TRIGL SERPL-MCNC: 111 MG/DL (ref 35–150)
TSH SERPL DL<=0.005 MIU/L-ACNC: 1.97 UIU/ML (ref 0.36–3.74)
TSH SERPL DL<=0.005 MIU/L-ACNC: 3.27 UIU/ML (ref 0.36–3.74)
TSH SERPL DL<=0.005 MIU/L-ACNC: 7.33 UIU/ML (ref 0.36–3.74)
TV REST PULMONARY ARTERY PRESSURE: 46 MMHG
URATE SERPL-MCNC: 8.9 MG/DL (ref 2.6–6)
VENTRICULAR RATE: 72
VIT B1 BLD-SCNC: 81 NMOL/L (ref 70–180)
VIT B12 SERPL-MCNC: 765 PG/ML (ref 193–986)
VLDLC SERPL-MCNC: 22 MG/DL
WBC # BLD AUTO: 5.67 K/UL (ref 4.5–11)
WBC # BLD AUTO: 6.56 K/UL (ref 4.5–11)

## 2024-01-01 PROCEDURE — 84550 ASSAY OF BLOOD/URIC ACID: CPT | Mod: ,,, | Performed by: CLINICAL MEDICAL LABORATORY

## 2024-01-01 PROCEDURE — 99214 OFFICE O/P EST MOD 30 MIN: CPT | Mod: ,,, | Performed by: FAMILY MEDICINE

## 2024-01-01 PROCEDURE — 93306 TTE W/DOPPLER COMPLETE: CPT

## 2024-01-01 PROCEDURE — 82607 VITAMIN B-12: CPT | Mod: ,,, | Performed by: CLINICAL MEDICAL LABORATORY

## 2024-01-01 PROCEDURE — 93005 ELECTROCARDIOGRAM TRACING: CPT | Mod: RHCUB | Performed by: FAMILY MEDICINE

## 2024-01-01 PROCEDURE — 84443 ASSAY THYROID STIM HORMONE: CPT | Mod: ,,, | Performed by: CLINICAL MEDICAL LABORATORY

## 2024-01-01 PROCEDURE — 93880 EXTRACRANIAL BILAT STUDY: CPT | Mod: TC

## 2024-01-01 PROCEDURE — 85025 COMPLETE CBC W/AUTO DIFF WBC: CPT | Mod: ,,, | Performed by: CLINICAL MEDICAL LABORATORY

## 2024-01-01 PROCEDURE — 85651 RBC SED RATE NONAUTOMATED: CPT | Mod: ,,, | Performed by: CLINICAL MEDICAL LABORATORY

## 2024-01-01 PROCEDURE — 93227 XTRNL ECG REC<48 HR R&I: CPT | Mod: ,,, | Performed by: STUDENT IN AN ORGANIZED HEALTH CARE EDUCATION/TRAINING PROGRAM

## 2024-01-01 PROCEDURE — 80061 LIPID PANEL: CPT | Mod: ,,, | Performed by: CLINICAL MEDICAL LABORATORY

## 2024-01-01 PROCEDURE — 93225 XTRNL ECG REC<48 HRS REC: CPT

## 2024-01-01 PROCEDURE — 70450 CT HEAD/BRAIN W/O DYE: CPT | Mod: TC

## 2024-01-01 PROCEDURE — 82977 ASSAY OF GGT: CPT | Mod: ,,, | Performed by: CLINICAL MEDICAL LABORATORY

## 2024-01-01 PROCEDURE — 84425 ASSAY OF VITAMIN B-1: CPT | Mod: 90,,, | Performed by: CLINICAL MEDICAL LABORATORY

## 2024-01-01 PROCEDURE — 84436 ASSAY OF TOTAL THYROXINE: CPT | Mod: ,,, | Performed by: CLINICAL MEDICAL LABORATORY

## 2024-01-01 PROCEDURE — 86140 C-REACTIVE PROTEIN: CPT | Mod: ,,, | Performed by: CLINICAL MEDICAL LABORATORY

## 2024-01-01 PROCEDURE — 36415 COLL VENOUS BLD VENIPUNCTURE: CPT | Performed by: FAMILY MEDICINE

## 2024-01-01 PROCEDURE — 82746 ASSAY OF FOLIC ACID SERUM: CPT | Mod: ,,, | Performed by: CLINICAL MEDICAL LABORATORY

## 2024-01-01 PROCEDURE — 84439 ASSAY OF FREE THYROXINE: CPT | Mod: ,,, | Performed by: CLINICAL MEDICAL LABORATORY

## 2024-01-01 PROCEDURE — 99213 OFFICE O/P EST LOW 20 MIN: CPT | Mod: ,,, | Performed by: FAMILY MEDICINE

## 2024-01-01 PROCEDURE — 93306 TTE W/DOPPLER COMPLETE: CPT | Mod: 26,,, | Performed by: INTERNAL MEDICINE

## 2024-01-01 PROCEDURE — 93010 ELECTROCARDIOGRAM REPORT: CPT | Mod: ,,, | Performed by: FAMILY MEDICINE

## 2024-01-01 PROCEDURE — 80053 COMPREHEN METABOLIC PANEL: CPT | Mod: ,,, | Performed by: CLINICAL MEDICAL LABORATORY

## 2024-01-01 RX ORDER — FLUOXETINE HYDROCHLORIDE 40 MG/1
40 CAPSULE ORAL DAILY
Qty: 90 CAPSULE | Refills: 0 | Status: SHIPPED | OUTPATIENT
Start: 2024-01-01 | End: 2024-05-08

## 2024-01-01 RX ORDER — FLUOXETINE HYDROCHLORIDE 20 MG/1
20 CAPSULE ORAL DAILY
Qty: 30 CAPSULE | Refills: 11 | Status: SHIPPED | OUTPATIENT
Start: 2024-01-01 | End: 2024-01-01

## 2024-01-01 RX ORDER — FUROSEMIDE 20 MG/1
1 TABLET ORAL DAILY
COMMUNITY
End: 2024-05-08

## 2024-01-01 RX ORDER — PANTOPRAZOLE SODIUM 40 MG/1
40 TABLET, DELAYED RELEASE ORAL 2 TIMES DAILY
Qty: 30 TABLET | Refills: 5 | Status: SHIPPED | OUTPATIENT
Start: 2024-01-01 | End: 2024-05-08

## 2024-01-01 RX ORDER — POTASSIUM CHLORIDE 750 MG/1
10 CAPSULE, EXTENDED RELEASE ORAL DAILY
COMMUNITY
End: 2024-05-08

## 2024-02-22 NOTE — PROGRESS NOTES
Dagoberto Ravi DO   [address]  [place]  [address]      PATIENT NAME: Ene Martin  : 1955  DATE: 24  MRN: [id number]      Billing Provider: Dagoberto Ravi DO  Level of Service:   Patient PCP Information       Provider PCP Type    [facility]ANG General            Reason for Visit / Chief Complaint: Nausea (Pt reports nausea and vomiting. Denies abdominal pain at this time. Pt states when she puts food in her mouth it feels weird and when it goes down here throat it makes her nauseous. Denies swallowing difficulty.  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 Pt states nausea may be related to depression. Pt states she lives alone. Daughter lives 4 hours away. Pt voiced that she doesn't a good support system. Daughter does call every day. 2 sons that in [place]. Pt has grandson every every other weekend. Pt states she has a lot of guilt with husbands death. Pt states she retired to spend time with him. ), and Health Maintenance (COVID-19 Vaccine(1) Never done/TETANUS VACCINE Never done/DEXA Scan Never done/Shingles Vaccine(1 of 2) Never done/RSV Vaccine (Age 60+ and Pregnant patients)(1 - 1-dose 60+ series) Never done/Pneumococcal Vaccines (Age 65+)(2 of 2 - PPSV23 or PCV20) due on 2015/Mammogram due on 2024/)       Update PCP  Update Chief Complaint         History of Present Illness / Problem Focused Workflow     Ene Martin presents to the clinic with Nausea (Pt reports nausea and vomiting. Denies abdominal pain at this time. Pt states when she puts food in her mouth it feels weird and when it goes down here throat it makes her nauseous. Denies swallowing difficulty.  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 Pt states nausea may be related to depression. Pt states she lives alone. Daughter lives 4 hours away. Pt voiced that she doesn't a good support system. Daughter does call every day. 2 sons that in [place] area. Pt has grandson every every other weekend. Pt states she has a lot of guilt with husbands death. Pt states she retired to spend time with him. ), and Health Maintenance (COVID-19 Vaccine(1) Never done/TETANUS VACCINE Never done/DEXA Scan Never done/Shingles Vaccine(1 of 2) Never done/RSV Vaccine (Age 60+ and Pregnant patients)(1 - 1-dose 60+ series) Never done/Pneumococcal Vaccines (Age 65+)(2 of 2 - PPSV23 or PCV20) due on 05/25/2015/Mammogram due on 03/20/2024/)     Patient reports bad taste in her mouth in his difficulty with eating.  She states she can swallow most times patient has difficulty with it.  She denies any fever chills or sweats.  She denies any chest pain or palpitations.  She denies vomiting up blood treat her.  She has had esophagitis in the past.  She does drink alcohol on a daily basis.  No blood in her stools or black tarry stools.        Review of Systems     Review of Systems   Constitutional:  Positive for appetite change. Negative for activity change, chills, fatigue and fever.   HENT:  Negative for nasal congestion, ear discharge, ear pain, mouth dryness, mouth sores, postnasal drip, sinus pressure/congestion, sore throat and voice change.    Eyes:  Negative for pain, discharge, redness, itching and visual disturbance.   Respiratory:  Negative for apnea, cough, chest tightness, shortness of breath and wheezing.    Cardiovascular:  Negative for chest pain, palpitations and leg swelling.   Gastrointestinal:  Positive for abdominal pain, nausea and reflux. Negative for abdominal distention, anal bleeding, blood in stool, change in bowel habit, constipation, diarrhea and vomiting.   Endocrine:  Negative for cold intolerance, heat intolerance, polydipsia, polyphagia and polyuria.   Genitourinary:  Negative for difficulty urinating, enuresis, frequency, genital sores, hematuria, hot flashes, menstrual irregularity, urgency and vaginal dryness.   Musculoskeletal:  Negative for arthralgias, back pain, gait problem, leg pain, myalgias and neck pain.   Integumentary:  Negative for rash, mole/lesion, breast mass, breast discharge and breast tenderness.   Allergic/Immunologic: Negative for environmental allergies and food allergies.   Neurological:  Positive for weakness and coordination difficulties. Negative for dizziness, vertigo, tremors, seizures, syncope, facial asymmetry, speech difficulty, light-headedness, numbness, headaches and memory loss.   Hematological:  Negative for adenopathy. Does not bruise/bleed easily.   Psychiatric/Behavioral:  Negative for agitation, behavioral problems, confusion, decreased concentration, dysphoric mood, hallucinations, self-injury, sleep disturbance and suicidal ideas. The patient is not nervous/anxious and is not hyperactive.    Breast: Negative for mass and tenderness      Medical / Social / Family History     Past Medical History:   Diagnosis Date    Anxiety and depression     Cataract     Encounter for blood transfusion 2023    [facility]    ETOH abuse 10/24/2022    GERD (gastroesophageal reflux disease)     History of alcohol abuse     Hyperlipidemia     Hypertension     Hypothyroidism     Idiopathic thrombocytopenic purpura     Insomnia 2022    Iron deficiency anemia 2023    Left bundle branch block     Osteoporosis     Pernicious anemia        Past Surgical History:   Procedure Laterality Date    CATARACT EXTRACTION Bilateral      SECTION      COLONOSCOPY  2023    diagnostic to due blood loss-Dr. Tran-repeat in 1 year    ESOPHAGOGASTRODUODENOSCOPY  2023    HYSTERECTOMY  2003    OOPHORECTOMY      THYROIDECTOMY       vocal cord surgery         Social History  Ms.  reports that she has been smoking cigarettes and vaping with nicotine. She started smoking about 52 years ago. She has a 16.5 pack-year smoking history. She has been exposed to tobacco smoke. She has never used smokeless tobacco. She reports current alcohol use of about 10.0 standard drinks of alcohol per week. She reports that she does not use drugs.    Family History  Ms.'s family history includes Lung cancer in her father; Lupus in her daughter; No Known Problems in her brother, maternal grandfather, maternal grandmother, paternal grandfather, paternal grandmother, sister, and son; Osteoporosis in her mother.    Medications and Allergies     Medications  Outpatient Medications Marked as Taking for the 24 encounter (Office Visit) with Dagoberto Ravi, DO   Medication Sig Dispense Refill    BYSTOLIC 5 mg Tab Take 1 tablet (5 mg total) by mouth once daily. 30 tablet 3    calcium carbonate-vitamin D3 500 mg-3.125 mcg (125 unit) per tablet Take 1 tablet by mouth once daily.      [] ENTRESTO 24-26 mg per tablet Take 1 tablet by mouth 2 (two) times daily. 180 tablet 0    furosemide (LASIX) 20 MG tablet Take 2 tablets (40 mg total) by mouth once daily. 30 tablet 5    ondansetron (ZOFRAN-ODT) 4 MG TbDL Take 1 tablet (4 mg total) by mouth every 8 (eight) hours as needed (nausea). 30 tablet 1    SYNTHROID 125 mcg tablet Take 1 tablet (125 mcg total) by mouth once daily. 30 tablet 0    traZODone (DESYREL) 100 MG tablet Take 1 tablet (100 mg total) by mouth every evening. 30 tablet 11    [DISCONTINUED] pantoprazole (PROTONIX) 40 MG tablet Take 1 tablet (40 mg total) by mouth 2 (two) times daily. 30 tablet 5    [DISCONTINUED] vortioxetine (TRINTELLIX) 10 mg Tab Take 1 tablet (10 mg total) by mouth once daily. 30 tablet 3       Allergies  Review of patient's allergies indicates:   Allergen Reactions    Lisinopril Other (See Comments)     thrombocytopenia        Physical Examination     Vitals:    02/22/24 1433   BP: 104/71   Pulse: 66   Resp: 19   Temp: 97.6 °F (36.4 °C)     Physical Exam  Vitals reviewed.   Constitutional:       General: She is not in acute distress.     Appearance: Normal appearance. She is normal weight.   HENT:      Head: Normocephalic and atraumatic.      Nose: Nose normal.      Mouth/Throat:      Mouth: Mucous membranes are moist.      Pharynx: Oropharynx is clear. No oropharyngeal exudate or posterior oropharyngeal erythema.   Eyes:      General: No scleral icterus.     Extraocular Movements: Extraocular movements intact.      Conjunctiva/sclera: Conjunctivae normal.      Pupils: Pupils are equal, round, and reactive to light.   Neck:      Vascular: No carotid bruit.   Cardiovascular:      Rate and Rhythm: Normal rate and regular rhythm.      Pulses: Normal pulses.      Heart sounds: Normal heart sounds. No murmur heard.     No gallop.   Pulmonary:      Effort: Pulmonary effort is normal.      Breath sounds: Normal breath sounds. No wheezing.   Abdominal:      General: Abdomen is flat. Bowel sounds are normal. There is no distension.      Palpations: Abdomen is soft. There is no mass.      Tenderness: There is abdominal tenderness. There is no guarding or rebound.      Hernia: No hernia is present.   Musculoskeletal:         General: Normal range of motion.      Cervical back: Normal range of motion and neck supple.      Right lower leg: No edema.      Left lower leg: No edema.   Lymphadenopathy:      Cervical: No cervical adenopathy.   Skin:     General: Skin is warm and dry.      Capillary Refill: Capillary refill takes less than 2 seconds.      Coloration: Skin is not jaundiced.      Findings: No lesion.   Neurological:      General: No focal deficit present.      Mental Status: She is alert and oriented to person, place, and time. Mental status is at baseline.      Cranial Nerves: No cranial nerve deficit.      Sensory: No sensory deficit.       Motor: No weakness.      Coordination: Coordination normal.      Gait: Gait normal.      Deep Tendon Reflexes: Reflexes normal.   Psychiatric:         Mood and Affect: Mood normal.         Behavior: Behavior normal.         Thought Content: Thought content normal.         Judgment: Judgment normal.               Lab Results   Component Value Date    WBC 6.56 02/22/2024    HGB 13.0 02/22/2024    HCT 36.6 (L) 02/22/2024    MCV 94.8 02/22/2024    PLT 82 (L) 02/22/2024          Sodium   Date Value Ref Range Status   02/22/2024 135 (L) 136 - 145 mmol/L Final     Potassium   Date Value Ref Range Status   02/22/2024 3.2 (L) 3.5 - 5.1 mmol/L Final     Chloride   Date Value Ref Range Status   02/22/2024 92 (L) 98 - 107 mmol/L Final     CO2   Date Value Ref Range Status   02/22/2024 34 (H) 21 - 32 mmol/L Final     Glucose   Date Value Ref Range Status   02/22/2024 90 74 - 106 mg/dL Final     BUN   Date Value Ref Range Status   02/22/2024 9 7 - 18 mg/dL Final     Creatinine   Date Value Ref Range Status   02/22/2024 1.25 (H) 0.55 - 1.02 mg/dL Final     Calcium   Date Value Ref Range Status   02/22/2024 8.0 (L) 8.5 - 10.1 mg/dL Final     Total Protein   Date Value Ref Range Status   02/22/2024 6.9 6.4 - 8.2 g/dL Final     Albumin   Date Value Ref Range Status   02/22/2024 3.1 (L) 3.5 - 5.0 g/dL Final     Bilirubin, Total   Date Value Ref Range Status   02/22/2024 1.1 >0.0 - 1.2 mg/dL Final     Alk Phos   Date Value Ref Range Status   02/22/2024 143 (H) 55 - 142 U/L Final     AST   Date Value Ref Range Status   02/22/2024 104 (H) 15 - 37 U/L Final     ALT   Date Value Ref Range Status   02/22/2024 56 13 - 56 U/L Final     Anion Gap   Date Value Ref Range Status   02/22/2024 12 7 - 16 mmol/L Final     eGFR   Date Value Ref Range Status   07/18/2022 62 >=60 mL/min/1.73m² Final      EGD  Narrative: Table formatting from the original result was not included.  Procedure Date  6/23/23    Impression  Overall   Impression:    -  Mild gastritis, biopsied   - The examined esophagus, duodenum, and jejunum (proximal) were normal   - The exam was otherwise normal   - No stricture, hiatal hernia, esophagitis, peptic ulcer, bezoar, GOO,   AVM, or mass  - Random biopsies obtained in the duodenum (chronic ARIANA)     Recommendation   Await pathology results  No findings on exam to explain anemia  Follow up in GI clinic with ANA Stern 1 week after discharge, and   we will plan for outpatient capsule endoscopy   Deescalate PPI to off or daily dosing; use pepcid as needed for reflux   symptoms      Outcome of procedure: successful Push Enteroscopy  Disposition: patient to recovery following procedure; return to [facility] when   appropriate parameters met  Provisions for follow up: please call my office for any unexpected   symptoms like chest or abdominal pain or bleeding following your   procedure.  Final Diagnosis: chronic ARIANA    Indication  66 yo WF with ITP and chronic ARIANA and reported dark stools. Bidirectional   endoscopy was unremarkable in Feb 2023. Returns with 5g drop in Hgb over   the last month.     Providers  Talat Tran MD Proceduralist   Kali Villela RN Registered Nurse   Alesha López RN Nurse   Bo Durbin CRNA CRNA     Medications  Moderate sedation administered by anesthesia staff - See anesthesia   record.    Preprocedure  A history and physical has been performed, and patient medication   allergies have been reviewed. The patient's tolerance of previous   anesthesia has been reviewed. The risks and benefits of the procedure and   the sedation options and risks were discussed with the patient. All   questions were answered and informed consent obtained.    ASA Score: ASA 3 - Patient with moderate systemic disease with functional   limitations  Mallampati Airway Score: II (hard and soft palate, upper portion of   tonsils anduvula visible)    Details of the Procedure  The patient underwent monitored anesthesia  care, which was administered by   an anesthesia professional. The patient's blood pressure, heart rate,   level of consciousness, oxygen, respirations, ECG and ETCO2 were monitored   throughout the procedure. The scope was introduced through the mouth and   advanced to the proximal part of the jejunum. Retroflexion was performed   in the cardia. The patient's estimated blood loss was minimal (<5 mL). The   procedure was not difficult. The patient tolerated the procedure well.   There were no apparent adverse events.     Scope: Gastroscope  Scope Serial: [id number]    Events  Procedure Events   Event Event Time     Procedure Events   Event Event Time     Findings  Mild, localized erythematous mucosa in the antrum, consistent with   gastritis; no bleeding was identified; performed 5 cold forceps biopsies   to rule out H. pylori  The upper third of the esophagus, middle third of the esophagus, lower   third of the esophagus, Z-line, cardia, fundus of the stomach, body of the   stomach, incisura, duodenal bulb, 1st part of the duodenum, 2nd part of   the duodenum, 3rd part of the duodenum, 4th part of the duodenum and   proximal jejunum appeared normal. Z-line is 40 cm from the incisors.   Performed random biopsy using biopsy forceps to rule out celiac disease.  US Elastography Parenchyma (Organ)  Narrative: EXAMINATION:  US ELASTOGRAPHY PARENCHYMA (ORGAN)    CLINICAL HISTORY:  liver fibrosis assessment;.    COMPARISON:  No previous similar    TECHNIQUE:  Real-time ultrasound images are captured and archived.  Elastography was performed using [employer] technology.    FINDINGS:  Median E mean measures 3.44 kPa.  IQR/median measures 2.5%.  No focal lesions are identified where demonstrated  Impression: Normal liver fibrosis staging.  Today's measurements correspond to Metavir score of F0    Electronically signed by: Hood Dumont  Date:    06/23/2023  Time:    10:37     Procedures   Lab Results   Component Value  Date    CHOL 146 02/22/2024    CHOL 131 02/12/2023     Lab Results   Component Value Date    HDL 44 02/22/2024    HDL 38 (L) 02/12/2023     Lab Results   Component Value Date    LDLCALC 80 02/22/2024    LDLCALC 70 02/12/2023     Lab Results   Component Value Date    TRIG 111 02/22/2024    TRIG 117 02/12/2023     Lab Results   Component Value Date    CHOLHDL 3.3 02/22/2024    CHOLHDL 3.4 02/12/2023      Lab Results   Component Value Date    HGBA1C 5.5 02/20/2023      Lab Results   Component Value Date    TSH 1.970 02/22/2024    [id number] 13.6 02/22/2024      Assessment and Plan (including Health Maintenance)      Problem List  Smart Sets  Document Outside HM   :    Plan:         Health Maintenance Due   Topic Date Due    COVID-19 Vaccine (1) Never done    TETANUS VACCINE  Never done    DEXA Scan  Never done    Shingles Vaccine (1 of 2) Never done    RSV Vaccine (Age 60+ and Pregnant patients) (1 - 1-dose 60+ series) Never done    Pneumococcal Vaccines (Age 65+) (2 of 2 - PPSV23 or PCV20) 05/25/2015    Mammogram  03/20/2024       Problem List Items Addressed This Visit          Psychiatric    Depression    Current Assessment & Plan     Major depression which is worsening likely related to her substance abuse.  Will continue her Prozac 20 mg daily.  Recommend counseling and psychiatric evaluation         Alcohol use    Current Assessment & Plan     Alcohol abuse patient not willing to stop his time.  Recommended detox.         RESOLVED: ETOH abuse    Current Assessment & Plan     Abuse and will check a GGT tests on her today.  Recommend detox counseling.         Relevant Medications    pantoprazole (PROTONIX) 40 MG tablet    Other Relevant Orders    Gamma GT (Completed)       Cardiac/Vascular    Hypertension    Current Assessment & Plan     Hypertension well controlled no change in medication.  Follow-up in 3 months.  Will check CBC and CMP yearly.  Goal is blood pressure 120/70 .  Blood pressure well controlled on  Bystolic.  No change in medicine.  Follow-up every 3 months.         Relevant Orders    CBC Auto Differential (Completed)    Comprehensive Metabolic Panel (Completed)    C-Reactive Protein (Completed)    Sedimentation Rate (Completed)    Uric Acid (Completed)    CBC Morphology (Completed)    Hyperlipidemia    Current Assessment & Plan     Lab Results   Component Value Date    LDLCALC 80 02/22/2024   Not currently on any medications.  Goal is 70 or less on LDL.  Continue low-fat low-cholesterol diet           Relevant Orders    Lipid Panel (Completed)       Oncology    Iron deficiency anemia    Current Assessment & Plan     Will need to check serum iron TIBC and ferritin.  Will call her with the results of her lab work.         Relevant Medications    FLUoxetine 20 MG capsule    Other Relevant Orders    Comprehensive Metabolic Panel (Completed)       Endocrine    Hypothyroidism    Current Assessment & Plan       Lab Results   Component Value Date    TSH 1.970 02/22/2024    Clinically euthyroid.  Last TSH was within normal limits.  No change in medication.  Follow-up in 6 months.           Relevant Orders    T4 (Completed)    TSH (Completed)       GI    Gastroesophageal reflux disease    Relevant Medications    pantoprazole (PROTONIX) 40 MG tablet    Other Relevant Orders    C-Reactive Protein (Completed)    Sedimentation Rate (Completed)     Other Visit Diagnoses       Encounter for screening mammogram for malignant neoplasm of breast    -  Primary    Age-related osteoporosis without current pathological fracture        Relevant Orders    DXA Bone Density Axial Skeleton 1 or more sites    Chronic alcoholic gastritis without hemorrhage                Health Maintenance Topics with due status: Not Due       Topic Last Completion Date    Colorectal Cancer Screening 02/13/2023    Hemoglobin A1c (Diabetic Prevention Screening) 02/20/2023    Lipid Panel 02/22/2024       Future Appointments   Date Time Provider Department  [place]   3/25/2024 12:40 PM [facility]   4/4/2024  2:20 PM Dagoberto Ravi DO [facility]   4/23/2024  2:00 PM [facility] NURSE, [facility]        Follow up in about 3 months (around 5/22/2024), or if symptoms worsen or fail to improve.     Signature:  Dagoberto Ravi DO  [address] [place]  [address]    Date of encounter: 2/22/24

## 2024-03-11 NOTE — ASSESSMENT & PLAN NOTE
Major depression which is worsening likely related to her substance abuse.  Will continue her Prozac 20 mg daily.  Recommend counseling and psychiatric evaluation

## 2024-03-11 NOTE — ASSESSMENT & PLAN NOTE
Lab Results   Component Value Date    LDLCALC 80 02/22/2024   Not currently on any medications.  Goal is 70 or less on LDL.  Continue low-fat low-cholesterol diet

## 2024-03-11 NOTE — ASSESSMENT & PLAN NOTE
Lab Results   Component Value Date    TSH 1.970 02/22/2024    Clinically euthyroid.  Last TSH was within normal limits.  No change in medication.  Follow-up in 6 months.

## 2024-03-11 NOTE — ASSESSMENT & PLAN NOTE
Hypertension well controlled no change in medication.  Follow-up in 3 months.  Will check CBC and CMP yearly.  Goal is blood pressure 120/70 .  Blood pressure well controlled on Bystolic.  No change in medicine.  Follow-up every 3 months.

## 2024-04-01 PROBLEM — N39.0 URINARY TRACT INFECTION WITHOUT HEMATURIA: Status: RESOLVED | Noted: 2023-12-29 | Resolved: 2024-04-01

## 2024-04-08 NOTE — PROGRESS NOTES
Ye Barnett DO   [facility]  [address] [address] [address]  [phone]      PATIENT NAME: Ene Martin  : 1955  DATE: 24  MRN: [id number]      Billing Provider: Ye Barnett DO  Level of Service:   Patient PCP Information       Provider PCP Type    Dagoberto Ravi DO General            Reason for Visit / Chief Complaint: Fatigue (Patient is a 68 year old female who presents to clinic related to weakness & syncopy episodes.  Patient had an episode in [employer], visual changes, had blood pressure checked 43/58.   Patient has fainted x 2, fell, hit head both times 3/11/24 & 3/25/24.  ), Extremity Weakness (Patient reports extreme leg weakness x 2 months. ), Anorexia (Patient reports loss of appetite x 3 months.), and Hypotension (Patient has decreased blood pressure medications since incident (low reading) lasix 20 mg, Entresto 24 mg, not taking Bystolic.)       Update PCP  Update Chief Complaint         History of Present Illness / Problem Focused Workflow     Ene Martin presents to the clinic with Fatigue (Patient is a 68 year old female who presents to clinic related to weakness & syncopy episodes.  Patient had an episode in [employer], visual changes, had blood pressure checked 43/58.   Patient has fainted x 2, fell, hit head both times 3/11/24 & 3/25/24.  ), Extremity Weakness (Patient reports extreme leg weakness x 2 months. ), Anorexia (Patient reports loss of appetite x 3 months.), and Hypotension (Patient has decreased blood pressure medications since incident (low reading) lasix 20 mg, Entresto 24 mg, not taking Bystolic.)     HPI    Patient reports 2 syncopal episodes.  Patient denies loss of consciousness.  One episode occurred while she was on vacation and stood up after using the toilet.  Patient denies chest pain, shortness of breath, palpitations, headache, and visual disturbances.    Review of Systems   Constitutional:  Negative  for chills, diaphoresis and fever.   HENT:  Negative for congestion and sore throat.    Respiratory:  Negative for shortness of breath.    Cardiovascular:  Negative for chest pain and palpitations.   Gastrointestinal:  Negative for abdominal pain, constipation, diarrhea, nausea and vomiting.   Genitourinary:  Negative for dysuria and hematuria.   Skin:  Negative for rash.   Neurological:  Positive for syncope. Negative for dizziness, light-headedness and headaches.       Medical / Social / Family History     Past Medical History:   Diagnosis Date    Anxiety and depression     Cataract     Encounter for blood transfusion 2023    [facility]    ETOH abuse 10/24/2022    GERD (gastroesophageal reflux disease)     History of alcohol abuse     Hyperlipidemia     Hypertension     Hypothyroidism     Idiopathic thrombocytopenic purpura     Insomnia 2022    Iron deficiency anemia 2023    Left bundle branch block     Osteoporosis     Pernicious anemia        Past Surgical History:   Procedure Laterality Date    CATARACT EXTRACTION Bilateral      SECTION      COLONOSCOPY  2023    diagnostic to due blood loss-Dr. Tran-repeat in 1 year    ESOPHAGOGASTRODUODENOSCOPY  2023    HYSTERECTOMY      OOPHORECTOMY      THYROIDECTOMY      vocal cord surgery         Social History  Ms. Martin  reports that she has been smoking cigarettes and vaping with nicotine. She started smoking about 52 years ago. She has a 15.5 pack-year smoking history. She has been exposed to tobacco smoke. She has never used smokeless tobacco. She reports current alcohol use of about 10.0 standard drinks of alcohol per week. She reports that she does not use drugs.    Family History  Ms.'s Martin family history includes Lung cancer in her father; Lupus in her daughter; No Known Problems in her brother, maternal grandfather, maternal grandmother, paternal grandfather, paternal grandmother, sister, and  son; Osteoporosis in her mother.    Medications and Allergies     Medications  Outpatient Medications Marked as Taking for the 4/8/24 encounter (Office Visit) with Ye Barnett, DO   Medication Sig Dispense Refill    calcium carbonate-vitamin D3 500 mg-3.125 mcg (125 unit) per tablet Take 1 tablet by mouth once daily.      furosemide (LASIX) 20 MG tablet Take 2 tablets (40 mg total) by mouth once daily. (Patient taking differently: Take 20 mg by mouth once daily. Patient has decreased her self to 20mg daily.) 30 tablet 5    pantoprazole (PROTONIX) 40 MG tablet Take 1 tablet (40 mg total) by mouth 2 (two) times daily. 30 tablet 5    potassium chloride (MICRO-K) 10 MEQ CpSR Take 10 mEq by mouth once daily.      sacubitril/valsartan (ENTRESTO ORAL) Take 24 mg by mouth once daily. Patient has decreased her dose to 1 time daily.      SYNTHROID 125 mcg tablet Take 1 tablet (125 mcg total) by mouth once daily. 30 tablet 0    traZODone (DESYREL) 100 MG tablet Take 1 tablet (100 mg total) by mouth every evening. 30 tablet 11    [DISCONTINUED] FLUoxetine 20 MG capsule Take 1 capsule (20 mg total) by mouth once daily. 30 capsule 11       Allergies  Review of patient's allergies indicates:   Allergen Reactions    Lisinopril Other (See Comments)     thrombocytopenia       Physical Examination     Vitals:    04/08/24 1318   BP: 102/68   Pulse: 86   Temp: 97.7 °F (36.5 °C)     Physical Exam  Vitals and nursing note reviewed.   Constitutional:       General: She is not in acute distress.     Appearance: She is not ill-appearing, toxic-appearing or diaphoretic.   HENT:      Head: Normocephalic and atraumatic.      Right Ear: External ear normal.      Left Ear: External ear normal.      Mouth/Throat:      Mouth: Mucous membranes are moist.      Pharynx: No oropharyngeal exudate or posterior oropharyngeal erythema.   Eyes:      General: No scleral icterus.        Right eye: No discharge.         Left eye: No discharge.       Extraocular Movements: Extraocular movements intact.      Conjunctiva/sclera: Conjunctivae normal.      Pupils: Pupils are equal, round, and reactive to light.   Neck:      Vascular: No carotid bruit.   Cardiovascular:      Rate and Rhythm: Normal rate and regular rhythm.      Heart sounds: No murmur heard.     No friction rub. No gallop.   Pulmonary:      Effort: No respiratory distress.      Breath sounds: No stridor. No wheezing, rhonchi or rales.   Abdominal:      General: Abdomen is flat.      Tenderness: There is no abdominal tenderness. There is no guarding.   Musculoskeletal:         General: No swelling.      Right lower leg: No edema.      Left lower leg: No edema.   Skin:     General: Skin is warm and dry.      Coloration: Skin is not jaundiced.   Neurological:      Mental Status: She is alert and oriented to person, place, and time.         Assessment and Plan (including Health Maintenance)      Problem List  Smart Sets  Document Outside HM   :    Plan:         Health Maintenance Due   Topic Date Due    COVID-19 Vaccine (1) Never done    TETANUS VACCINE  Never done    DEXA Scan  Never done    Shingles Vaccine (1 of 2) Never done    RSV Vaccine (Age 60+ and Pregnant patients) (1 - 1-dose 60+ series) Never done    Pneumococcal Vaccines (Age 65+) (2 of 2 - PPSV23 or PCV20) 05/25/2015    Mammogram  03/20/2024       Problem List Items Addressed This Visit          Oncology    Iron deficiency anemia     Other Visit Diagnoses       Encounter for screening mammogram for breast cancer    -  Primary    Relevant Orders    Mammo Digital Screening Bilat    Positive depression screening        I have reviewed the positive depression score which warrants active treatment with psychotherapy and/or medications.    Relevant Medications    FLUoxetine 40 MG capsule    Syncope, unspecified syncope type        Relevant Orders    TSH (Completed)    Vitamin B12 & Folate (Completed)    Echo    Holter monitor - 24 hour     Comprehensive Metabolic Panel (Completed)    POCT PT/INR    Vitamin B1, WB    Radiology US Carotid Bilateral (Completed)    CBC Auto Differential (Completed)    Path Review, Peripheral Smear (Completed)    POCT EKG 12-LEAD (Manually Resulted by Ordering Provider) (Completed)    CBC Morphology (Completed)    Other abnormal findings on diagnostic imaging of central nervous system        Relevant Orders    TSH (Completed)    Unsteadiness on feet        Relevant Orders    Vitamin B12 & Folate (Completed)    Syncope and collapse        Relevant Orders    CT Head Without Contrast (Completed)        Multiple etiologies may exist or coexist that can contribute to her syncopal episodes.  Patient has a history of severe anemia and this may be related to blood loss and hypovolemia.  Patient also has a significant history of alcohol abuse and this may be related to dehydration and hypovolemia as well.  Other underlying factors in 2 including cardiovascular disease may be contributory.  Blood work will be obtained today.  Cardiac workup including Holter monitor and echo as well as CT head will be obtained.  Patient may be referred to Cardiology.  Patient counseled at length that she should pursue cessation of alcohol abuse.  Patient is not ready to do so at this time.  Patient advised to maintain well-hydrated and avoid alcohol.  Patient counseled at length to call, return to clinic or present to the ED should symptoms persist or worsen.  Follow up in 3 days or sooner if needed.  Patient's signals understanding and agreement with the plan of care.  Health Maintenance Topics with due status: Not Due       Topic Last Completion Date    Colorectal Cancer Screening 02/13/2023    Hemoglobin A1c (Diabetic Prevention Screening) 02/20/2023    Lipid Panel 02/22/2024       Future Appointments   Date Time Provider Department Center   4/12/2024  2:20 PM Ye Barnett DO [facility]   4/24/2024 11:00 AM [facility]  [facility]   4/24/2024 12:00 PM [facility]    5/2/2024  2:40 PM [facility] MAMMO2 [facility]            Signature:  Ye Barnett DO  [facility] [address] [place] [address]  [phone]    Date of encounter: 4/8/24I have reviewed the positive depression score which warrants active treatment with psychotherapy and/or medications.

## 2024-04-15 NOTE — PROGRESS NOTES
Ye Barnett DO   [facility]  [address] [address] [address]  [phone]      PATIENT NAME: Ene Martin  : 1955  DATE: 24  MRN: [id number]      Billing Provider: Ye Barnett DO  Level of Service:   Patient PCP Information       Provider PCP Type    Dagoberto Ravi DO General            Reason for Visit / Chief Complaint: Follow-up (Patient is a 68 year old female who presents to the clinic for 3 day follow up related to syncopy & review of labs.  Patient reports she is not feeling any better, about the same.  Patient did have CT/head & Carotid doppler--good results.  Patient is scheduled for Holter monitor & echo on 24.) and Headache (Patient reports mild headache today.)       Update PCP  Update Chief Complaint         History of Present Illness / Problem Focused Workflow     Ene Martin presents to the clinic with Follow-up (Patient is a 68 year old female who presents to the clinic for 3 day follow up related to syncopy & review of labs.  Patient reports she is not feeling any better, about the same.  Patient did have CT/head & Carotid doppler--good results.  Patient is scheduled for Holter monitor & echo on 24.) and Headache (Patient reports mild headache today.)     Follow-up  Associated symptoms include headaches. Pertinent negatives include no abdominal pain, chest pain, chills, congestion, diaphoresis, fever, nausea, rash, sore throat or vomiting.   Headache   Pertinent negatives include no abdominal pain, dizziness, fever, nausea, sore throat or vomiting.       Review of Systems     Review of Systems   Constitutional:  Negative for chills, diaphoresis and fever.   HENT:  Negative for congestion and sore throat.    Respiratory:  Negative for shortness of breath.    Cardiovascular:  Negative for chest pain and palpitations.   Gastrointestinal:  Negative for abdominal pain, constipation, diarrhea, nausea and vomiting.    Genitourinary:  Negative for dysuria and hematuria.   Skin:  Negative for rash.   Neurological:  Positive for headaches. Negative for dizziness and light-headedness.       Medical / Social / Family History     Past Medical History:   Diagnosis Date    Anxiety and depression     Cataract     Encounter for blood transfusion 2023    [facility]    ETOH abuse 10/24/2022    GERD (gastroesophageal reflux disease)     History of alcohol abuse     Hyperlipidemia     Hypertension     Hypothyroidism     Idiopathic thrombocytopenic purpura     Insomnia 2022    Iron deficiency anemia 2023    Left bundle branch block     Osteoporosis     Pernicious anemia        Past Surgical History:   Procedure Laterality Date    CATARACT EXTRACTION Bilateral      SECTION      COLONOSCOPY  2023    diagnostic to due blood loss-Dr. Tran-repeat in 1 year    ESOPHAGOGASTRODUODENOSCOPY  2023    HYSTERECTOMY      OOPHORECTOMY      THYROIDECTOMY      vocal cord surgery         Social History  Ms. Martin  reports that she has been smoking cigarettes and vaping with nicotine. She started smoking about 52 years ago. She has a 15.5 pack-year smoking history. She has been exposed to tobacco smoke. She has never used smokeless tobacco. She reports current alcohol use of about 10.0 standard drinks of alcohol per week. She reports that she does not use drugs.    Family History  Ms.'s Martin family history includes Lung cancer in her father; Lupus in her daughter; No Known Problems in her brother, maternal grandfather, maternal grandmother, paternal grandfather, paternal grandmother, sister, and son; Osteoporosis in her mother.    Medications and Allergies     Medications  Current Outpatient Medications   Medication Sig Dispense Refill    calcium carbonate-vitamin D3 500 mg-3.125 mcg (125 unit) per tablet Take 1 tablet by mouth once daily.      FLUoxetine 40 MG capsule Take 1 capsule (40 mg  total) by mouth once daily. 90 capsule 0    pantoprazole (PROTONIX) 40 MG tablet Take 1 tablet (40 mg total) by mouth 2 (two) times daily. 30 tablet 5    potassium chloride (MICRO-K) 10 MEQ CpSR Take 10 mEq by mouth once daily.      sacubitril/valsartan (ENTRESTO ORAL) Take 24 mg by mouth once daily. Patient has decreased her dose to 1 time daily.      SYNTHROID 125 mcg tablet Take 1 tablet (125 mcg total) by mouth once daily. 30 tablet 0    traZODone (DESYREL) 100 MG tablet Take 1 tablet (100 mg total) by mouth every evening. 30 tablet 11    BYSTOLIC 5 mg Tab Take 1 tablet (5 mg total) by mouth once daily. (Patient not taking: Reported on 4/8/2024) 30 tablet 3    furosemide (LASIX) 20 MG tablet Take 2 tablets (40 mg total) by mouth once daily. (Patient not taking: Reported on 4/12/2024) 30 tablet 5    furosemide (LASIX) 20 MG tablet Take 1 tablet by mouth once daily. (Patient not taking: Reported on 4/8/2024)      ondansetron (ZOFRAN-ODT) 4 MG TbDL Take 1 tablet (4 mg total) by mouth every 8 (eight) hours as needed (nausea). (Patient not taking: Reported on 4/8/2024) 30 tablet 1     No current facility-administered medications for this visit.       Allergies  Review of patient's allergies indicates:   Allergen Reactions    Lisinopril Other (See Comments)     thrombocytopenia       Physical Examination     Vitals:    04/12/24 1413   BP: 102/68   Pulse: 88   Resp: 20   Temp: 98.9 °F (37.2 °C)     Physical Exam  Vitals and nursing note reviewed.   Constitutional:       General: She is not in acute distress.     Appearance: She is ill-appearing (Chronically ill-appearing). She is not toxic-appearing or diaphoretic.   HENT:      Head: Normocephalic and atraumatic.      Right Ear: External ear normal.      Left Ear: External ear normal.      Mouth/Throat:      Mouth: Mucous membranes are moist.      Pharynx: No oropharyngeal exudate or posterior oropharyngeal erythema.   Eyes:      General: No scleral icterus.         Right eye: No discharge.         Left eye: No discharge.      Extraocular Movements: Extraocular movements intact.      Conjunctiva/sclera: Conjunctivae normal.      Pupils: Pupils are equal, round, and reactive to light.   Neck:      Vascular: No carotid bruit.   Cardiovascular:      Rate and Rhythm: Normal rate and regular rhythm.      Heart sounds: No murmur heard.     No friction rub. No gallop.   Pulmonary:      Effort: No respiratory distress.      Breath sounds: No stridor. No wheezing, rhonchi or rales.   Abdominal:      General: Abdomen is flat.      Tenderness: There is no abdominal tenderness. There is no guarding.   Musculoskeletal:         General: No swelling.      Right lower leg: No edema.      Left lower leg: No edema.   Skin:     General: Skin is warm and dry.      Coloration: Skin is not jaundiced.   Neurological:      Mental Status: She is alert and oriented to person, place, and time.   Psychiatric:         Speech: Speech is delayed.         Assessment and Plan (including Health Maintenance)      Problem List  Smart Sets  Document Outside HM   :    Plan:         Health Maintenance Due   Topic Date Due    TETANUS VACCINE  Never done    DEXA Scan  Never done    Shingles Vaccine (1 of 2) Never done    RSV Vaccine (Age 60+ and Pregnant patients) (1 - 1-dose 60+ series) Never done    Pneumococcal Vaccines (Age 65+) (2 of 2 - PPSV23 or PCV20) 05/25/2015    COVID-19 Vaccine (1 - 2023-24 season) Never done    Mammogram  03/20/2024       Problem List Items Addressed This Visit          Endocrine    Hypothyroidism - Primary    Relevant Orders    TSH (Completed)    T4, Free (Completed)     Other Visit Diagnoses       Syncope, unspecified syncope type        Relevant Orders    Ambulatory referral/consult to Cardiology        #Hypothyroidism  Last TSH was elevated.  Repeat thyroid panel will be obtained.  Synthroid dose may be adjusted.  Hypothyroidism and contributing to her syncopal episodes.  Patient will  be notified of results.  Medications may be adjusted accordingly.    #Syncope  Syncope workup has been ordered.  Cardiology referral placed.  Patient is expressing preference for Dr. Tabitha Robles was evaluated her in the past.    #Alcohol Abuse  Patient would like to detox.  Patient drinks 2 mixed cocktails in 1 cup of gin daily.  Patient advised to attempt to discontinue 1 mixed drink a day.  Patient is interested in detox at [facility].  Patient to call within one-week if she would like to be admitted for detox.  Strong suspicion that alcohol is contributing to her syncopal episodes as ethanol consumption can contribute to dehydration and hypovolemia.  Patient signals understanding and agreement with the plan of care.      Health Maintenance Topics with due status: Not Due       Topic Last Completion Date    Colorectal Cancer Screening 02/13/2023    Hemoglobin A1c (Diabetic Prevention Screening) 02/20/2023    Lipid Panel 02/22/2024       Future Appointments   Date Time Provider Department Center   4/24/2024 11:00 AM [facility] ECHO [facility]   4/24/2024 12:00 PM [facility] HOLTER/EKG [facility]   5/2/2024  2:40 PM [facility] MAMMO2 [facility]            Signature:  Ye Barnett, DO  [facility] [address] [address]  [phone]    Date of encounter: 4/12/24

## 2024-04-29 NOTE — TELEPHONE ENCOUNTER
Spoke with patient, went over Echocardiogram, Tere is working on appointment with Dr. Robles, will notify patient of date & time, once scheduled.     Appointment scheduled with Dr. Robles for June 19, 2024 at 10:40 am, per SOWMYA Craft (referral clerk), Dr. Robles will look over echo & if she needs to be seen sooner they will move appointment up.  Called patient to let her know.  Patient verbalized understanding.

## 2024-05-06 NOTE — TELEPHONE ENCOUNTER
Attempted to call patient, to go over Holter Monitor results, no answer, left message to return our call.  Results did not reveal any significant arrhythmia or heart block. Patients results were faxed to cardiologist.     Clinic notified patient  on 24.

## 2024-05-08 ENCOUNTER — TELEPHONE (OUTPATIENT)
Dept: FAMILY MEDICINE | Facility: CLINIC | Age: 69
End: 2024-05-08
Payer: MEDICARE

## 2024-05-08 NOTE — TELEPHONE ENCOUNTER
Per patient's daughter and [facility] obituary website. Patient passed away on May 6,2024. Discontinued all patient's medications and orders.

## 2024-05-08 NOTE — TELEPHONE ENCOUNTER
"Patient's daughter called clinic. Patient has passed away and daughter had some questions about patient's medical history. Explained to daughter we cannot discuss her mother's health with anyone due to patient had no HIPAA rep listed on her chart. She voiced understanding.She stated during she felt like her mother had "a lot more serious health issues than she let on or admitted too". Instructed patient's daughter on how to obtain copies of medical records if she desired to have copies. Given phone number to medical records at [facility] in [place] She was going to go through her mother's records at her home. She stated her mother had some papers with [facility] logo on the headings, so she stated she would read through those papers. She voiced, "I guess I am looking for answers and trying to find out if any thing is hereditary I should be concerned with". Patient voiced she was glad she had called the clinic and thanked us for our time.   "
